# Patient Record
Sex: MALE | Race: ASIAN | Employment: OTHER | URBAN - METROPOLITAN AREA
[De-identification: names, ages, dates, MRNs, and addresses within clinical notes are randomized per-mention and may not be internally consistent; named-entity substitution may affect disease eponyms.]

---

## 2017-02-07 PROBLEM — E11.9 TYPE 2 DIABETES MELLITUS WITHOUT COMPLICATION, WITHOUT LONG-TERM CURRENT USE OF INSULIN (HCC): Status: ACTIVE | Noted: 2017-02-07

## 2017-05-21 ENCOUNTER — ANESTHESIA EVENT (OUTPATIENT)
Dept: ENDOSCOPY | Age: 67
End: 2017-05-21
Payer: MEDICARE

## 2017-05-21 RX ORDER — SODIUM CHLORIDE 0.9 % (FLUSH) 0.9 %
5-10 SYRINGE (ML) INJECTION AS NEEDED
Status: CANCELLED | OUTPATIENT
Start: 2017-05-21

## 2017-05-21 RX ORDER — SODIUM CHLORIDE, SODIUM LACTATE, POTASSIUM CHLORIDE, CALCIUM CHLORIDE 600; 310; 30; 20 MG/100ML; MG/100ML; MG/100ML; MG/100ML
100 INJECTION, SOLUTION INTRAVENOUS CONTINUOUS
Status: CANCELLED | OUTPATIENT
Start: 2017-05-21

## 2017-05-22 ENCOUNTER — ANESTHESIA (OUTPATIENT)
Dept: ENDOSCOPY | Age: 67
End: 2017-05-22
Payer: MEDICARE

## 2017-05-22 ENCOUNTER — HOSPITAL ENCOUNTER (OUTPATIENT)
Age: 67
Setting detail: OUTPATIENT SURGERY
Discharge: HOME | End: 2017-05-22
Attending: SURGERY | Admitting: SURGERY
Payer: MEDICARE

## 2017-05-22 VITALS
WEIGHT: 162 LBS | DIASTOLIC BLOOD PRESSURE: 99 MMHG | BODY MASS INDEX: 26.03 KG/M2 | HEIGHT: 66 IN | HEART RATE: 77 BPM | OXYGEN SATURATION: 97 % | RESPIRATION RATE: 18 BRPM | SYSTOLIC BLOOD PRESSURE: 182 MMHG | TEMPERATURE: 97 F

## 2017-05-22 LAB — GLUCOSE BLD STRIP.AUTO-MCNC: 138 MG/DL (ref 65–100)

## 2017-05-22 PROCEDURE — 76060000032 HC ANESTHESIA 0.5 TO 1 HR: Performed by: SURGERY

## 2017-05-22 PROCEDURE — 82962 GLUCOSE BLOOD TEST: CPT

## 2017-05-22 PROCEDURE — 76040000025: Performed by: SURGERY

## 2017-05-22 PROCEDURE — 74011250636 HC RX REV CODE- 250/636

## 2017-05-22 RX ORDER — PROPOFOL 10 MG/ML
INJECTION, EMULSION INTRAVENOUS AS NEEDED
Status: DISCONTINUED | OUTPATIENT
Start: 2017-05-22 | End: 2017-05-22 | Stop reason: HOSPADM

## 2017-05-22 RX ORDER — PROPOFOL 10 MG/ML
INJECTION, EMULSION INTRAVENOUS
Status: DISCONTINUED | OUTPATIENT
Start: 2017-05-22 | End: 2017-05-22 | Stop reason: HOSPADM

## 2017-05-22 RX ORDER — SODIUM CHLORIDE, SODIUM LACTATE, POTASSIUM CHLORIDE, CALCIUM CHLORIDE 600; 310; 30; 20 MG/100ML; MG/100ML; MG/100ML; MG/100ML
INJECTION, SOLUTION INTRAVENOUS
Status: DISCONTINUED | OUTPATIENT
Start: 2017-05-22 | End: 2017-05-22 | Stop reason: HOSPADM

## 2017-05-22 RX ADMIN — PROPOFOL 140 MCG/KG/MIN: 10 INJECTION, EMULSION INTRAVENOUS at 11:30

## 2017-05-22 RX ADMIN — PROPOFOL 50 MG: 10 INJECTION, EMULSION INTRAVENOUS at 11:30

## 2017-05-22 RX ADMIN — SODIUM CHLORIDE, SODIUM LACTATE, POTASSIUM CHLORIDE, CALCIUM CHLORIDE: 600; 310; 30; 20 INJECTION, SOLUTION INTRAVENOUS at 11:24

## 2017-05-22 NOTE — PROCEDURES
Procedure in Detail:  Informed consent was obtained for the procedure. The patient was placed in the left lateral decubitus position and sedation was induced by anesthesia. The [id number] was inserted into the rectum and advanced under direct vision to the cecum, which was identified by the ileocecal valve and appendiceal orifice. The quality of the colonic preparation was good. A careful inspection was made as the colonoscope was withdrawn, including a retroflexed view of the rectum; findings and interventions are described below. Appropriate photodocumentation was obtained. Findings:   ANUS: Anal exam reveals no masses or hemorrhoids, sphincter tone is normal.   RECTUM: Rectal exam reveals no masses or hemorrhoids. SIGMOID COLON: The mucosa is normal with good vascular pattern and without ulcers, diverticula, and polyps. DESCENDING COLON: The mucosa is normal with good vascular pattern and without ulcers, diverticula, and polyps. SPLENIC FLEXURE: The splenic flexure is normal.   TRANSVERSE COLON: The mucosa is normal with good vascular pattern and without ulcers, diverticula, and polyps. HEPATIC FLEXURE: The hepatic flexure is normal.   ASCENDING COLON: The mucosa is normal with good vascular pattern and without ulcers, diverticula, and polyps. CECUM: The appendiceal orifice appears normal. The ileocecal valve appears normal.   TERMINAL ILEUM: The terminal ileum was not entered. Specimens: No specimens were collected. Complications: None; patient tolerated the procedure well. \    EBL - none    Recommendations:   - For colon cancer screening in this average-risk patient, colonoscopy may be repeated in 10 years.      Signed By: Harleen Tavares MD                        May 22, 2017

## 2017-05-22 NOTE — ANESTHESIA POSTPROCEDURE EVALUATION
Post-Anesthesia Evaluation and Assessment    Patient: Any Ni MRN: [id number]  SSN: [id number]    YOB: 1950  Age: 73 y.o. Sex: male       Cardiovascular Function/Vital Signs  Visit Vitals    /56    Pulse 77    Temp 36.1 °C (97 °F)    Resp 18    Ht 5' 6\" (1.676 m)    Wt 73.5 kg (162 lb)    SpO2 96%    BMI 26.15 kg/m2       Patient is status post total IV anesthesia anesthesia for Procedure(s):  COLONOSCOPY / BMI / 27. Nausea/Vomiting: None    Postoperative hydration reviewed and adequate. Pain:  Pain Scale 1: Visual (05/22/17 1157)  Pain Intensity 1: 0 (05/22/17 1157)   Managed    Neurological Status: At baseline    Mental Status and Level of Consciousness: Awake. Pulmonary Status:   O2 Device: Nasal cannula (05/22/17 1158)   Adequate oxygenation and airway patent    Complications related to anesthesia: None    Post-anesthesia assessment completed. No concerns   present.   Signed By: Jacquie Combs MD     May 22, 2017

## 2017-05-22 NOTE — IP AVS SNAPSHOT
[address] 
[phone] Patient: Brianda Crews MRN: [id number] ECT:1/09[birth year] You are allergic to the following No active allergies Recent Documentation Height Weight BMI Smoking Status 1.676 m 73.5 kg 26.15 kg/m2 Never Smoker Emergency Contacts Name Discharge Info Relation Home Work Mobile Carlos Esqueda  Spouse [3] [phone] About your hospitalization You were admitted on:  May 22, 2017 You last received care in the:  [facility] You were discharged on:  May 22, 2017 Unit phone number:  [phone] Why you were hospitalized Your primary diagnosis was:  Not on File Providers Seen During Your Hospitalizations Provider Role Specialty Primary office phone Ace Ceballos MD Attending Provider General Surgery [phone] Your Primary Care Physician (PCP) Primary Care Physician Office Phone Office Fax Thom Courser [phone] Follow-up Information None Your Appointments Wednesday May 24, 2017  8:30 AM EDT Follow Up with Lawrence Elder MD  
[facility] ([facility]) [address] 
[address] [place] [address]  
[phone] Current Discharge Medication List  
  
ASK your doctor about these medications Dose & Instructions Dispensing Information Comments Morning Noon Evening Bedtime Blood-Glucose Meter monitoring kit Your last dose was: Your next dose is:    
   
   
 See Admin Instructions. Use to test blood sugars twice daily. Dx: Type 2 diabetes mellitus without complication [facility] [H12.2] Refills:  0  
     
   
   
   
  
 clobetasol 0.05 % external solution Commonly known as:  Ladarius Shah Your last dose was:     
   
Your next dose is:    
   
   
 APPLY TO SCALP AT BEDTIME AS DIRECTED  
 Refills:  2 FREESTYLE LITE STRIPS strip Generic drug:  glucose blood VI test strips Your last dose was: Your next dose is:    
   
   
 TEST BLOOD SUGAR EVERY MORNING AND ONCE EVERY EVENING Quantity:  30 Strip Refills:  11  
     
   
   
   
  
 glyBURIDE-metFORMIN 5-500 mg per tablet Commonly known as:  Orpha Asha Your last dose was: Your next dose is:    
   
   
 Dose:  2 Tab Take 2 Tabs by mouth two (2) times daily (with meals) for 90 days. Quantity:  360 Tab Refills:  3  
     
   
   
   
  
 hydroCHLOROthiazide 25 mg tablet Commonly known as:  HYDRODIURIL Your last dose was: Your next dose is:    
   
   
 Dose:  25 mg Take 1 Tab by mouth daily for 90 days. Quantity:  90 Tab Refills:  3  
     
   
   
   
  
 ketoconazole 2 % shampoo Commonly known as:  NIZORAL Your last dose was: Your next dose is: SHAMPOO EVERY MORNING AS DIRECTED Refills:  2  
     
   
   
   
  
 * lancets 30 gauge Misc Your last dose was: Your next dose is:    
   
   
 See Admin Instructions. Use to test blood sugars twice daily. Dx: Type 2 diabetes mellitus without complication ([facility] [B89.3] Refills:  0  
     
   
   
   
  
 * FREESTYLE LANCETS 28 gauge Misc Generic drug:  lancets Your last dose was: Your next dose is:    
   
   
 TEST BLOOD SUGAR EVERY MORNING AND EVERY EVENING Refills:  12  
     
   
   
   
  
 losartan 100 mg tablet Commonly known as:  COZAAR Your last dose was: Your next dose is:    
   
   
 Dose:  100 mg Take 1 Tab by mouth daily for 90 days. Quantity:  90 Tab Refills:  3  
     
   
   
   
  
 pioglitazone 15 mg tablet Commonly known as:  ACTOS Your last dose was: Your next dose is:    
   
   
 Dose:  15 mg Take 1 Tab by mouth daily. Quantity:  30 Tab Refills:  11  
     
   
   
   
  
 pravastatin 40 mg tablet Commonly known as:  PRAVACHOL Your last dose was: Your next dose is:    
   
   
 Dose:  40 mg Take 1 Tab by mouth daily. Quantity:  90 Tab Refills:  3  
     
   
   
   
  
 sildenafil citrate 100 mg tablet Commonly known as:  VIAGRA Your last dose was: Your next dose is:    
   
   
 Dose:  100 mg Take 1 Tab by mouth as needed. Quantity:  10 Tab Refills:  11 * Notice: This list has 2 medication(s) that are the same as other medications prescribed for you. Read the directions carefully, and ask your doctor or other care provider to review them with you. Discharge Instructions Gastrointestinal Colonoscopy/Flexible Sigmoidoscopy - Lower Exam Discharge Instructions 1. Call Dr. Dav Rivas at [phone] for any problems or questions. 2. Contact the doctors office for follow up appointment as directed 3. Medication may cause drowsiness for several hours, therefore, do not drive or operate machinery for remainder of the day. 4. No alcohol today. 5. Ordinarily, you may resume regular diet and activity after exam unless otherwise specified by your physician. 6. Because of air put into your colon during exam, you may experience some abdominal distension, relieved by the passage of gas, for several hours. 7. Contact your physician if you have any of the following: 
a. Excessive amount of bleeding  large amount when having a bowel movement. Occasional specks of blood with bowel movement would not be unusual. 
b. Severe abdominal pain 
c. Fever or Chills Any additional instructions:  Repeat colon in 10 years Instructions given to Ana Rosa Millie and other family members. Instructions given by:  Spencer Landaverde RN Discharge Orders None ACO Transitions of Care Introducing [employer] [address] offers a voluntary care coordination program to provide high quality service and care to [facility] fee-for-service beneficiaries. Peg Ambrocio was designed to help you enhance your health and well-being through the following services: ? Transitions of Care  support for individuals who are transitioning from one care setting to another (example: Hospital to home). ? Chronic and Complex Care Coordination  support for individuals and caregivers of those with serious or chronic illnesses or with more than one chronic (ongoing) condition and those who take a number of different medications. If you meet specific medical criteria, a [address] [place] [address] Rd may call you directly to coordinate your care with your primary care physician and your other care providers. For questions about the [facility] programs, please, contact your physicians office. For general questions or additional information about Accountable Care Organizations: 
Please visit www.medicare.gov/acos. html or call 1-800-MEDICARE (8-922.355.5304) TTY users should call 5-885.407.6122. Introducing \[facility] [place] [facility]\"" & HEALTH SERVICES! Tanis Epley introduces [employer] Steps patient portal. Now you can access parts of your medical record, email your doctor's office, and request medication refills online. 1. In your internet browser, go to [email or web address]. [email or web address] 2. Click on the First Time User? Click Here link in the Sign In box. You will see the New Member Sign Up page. 3. Enter your [employer] Steps Access Code exactly as it appears below. You will not need to use this code after youve completed the sign-up process. If you do not sign up before the expiration date, you must request a new code. · [employer] Steps Access Code: [email or web address] Expires: 7/13/2017  8:51 AM 
 
4.  Enter the last four digits of your Social Security Number (xxxx) and Date of Birth (mm/dd/yyyy) as indicated and click Submit. You will be taken to the next sign-up page. 5. Create a [employer]ikip[employer] ID. This will be your [employer] login ID and cannot be changed, so think of one that is secure and easy to remember. 6. Create a [employer] password. You can change your password at any time. 7. Enter your Password Reset Question and Answer. This can be used at a later time if you forget your password. 8. Enter your e-mail address. You will receive e-mail notification when new information is available in [address] 9. Click Sign Up. You can now view and download portions of your medical record. 10. Click the Download Summary menu link to download a portable copy of your medical information. If you have questions, please visit the Frequently Asked Questions section of the [employer] website. Remember, [employer] is NOT to be used for urgent needs. For medical emergencies, dial 911. Now available from your iPhone and Android! General Information Please provide this summary of care documentation to your next provider. Patient Signature:  ____________________________________________________________ Date:  ____________________________________________________________  
  
Laure Marisela Provider Signature:  ____________________________________________________________ Date:  ____________________________________________________________

## 2017-05-22 NOTE — H&P (VIEW-ONLY)
Marvine Rubinstein, MD, [facility], [address] [place] [address]  Brielle Blair  Phone [phone]   Fax [phone]      Date of visit: 4/25/2017     Primary/Requesting provider: Augusto Perrin MD    Chief Complaint   Patient presents with   Georganna Duverney Colon Cancer Screening          HISTORY OF PRESENT ILLNESS  Dilip Paul is a 73 y.o. male. HPI          Dilip Paul is a 73y.o. year old here to discuss colonoscopy for:  Screening colonoscopy. His history is notable for no abdominal pain, no change in bowel habits, no blood in stools or black tarry stools. Last study:   no prior exams . There is not known colon cancer/adenomatous polyps in the family (see family history). Medications:  Current Outpatient Prescriptions   Medication Sig    amLODIPine (NORVASC) 5 mg tablet Take 1 Tab by mouth daily for 30 days.  pioglitazone (ACTOS) 15 mg tablet Take 1 Tab by mouth daily.  clobetasol (TEMOVATE) 0.05 % external solution APPLY TO SCALP AT BEDTIME AS DIRECTED    ketoconazole (NIZORAL) 2 % shampoo SHAMPOO EVERY MORNING AS DIRECTED    FREESTYLE LANCETS 28 gauge misc TEST BLOOD SUGAR EVERY MORNING AND EVERY EVENING    FREESTYLE LITE STRIPS strip TEST BLOOD SUGAR EVERY MORNING AND ONCE EVERY EVENING    pravastatin (PRAVACHOL) 40 mg tablet Take 1 Tab by mouth daily.  sildenafil citrate (VIAGRA) 100 mg tablet Take 1 Tab by mouth as needed.  Blood-Glucose Meter monitoring kit See Admin Instructions. Use to test blood sugars twice daily. Dx: Type 2 diabetes mellitus without complication (HCC) [T67.8]    lancets 30 gauge misc See Admin Instructions. Use to test blood sugars twice daily. Dx: Type 2 diabetes mellitus without complication (HCC) [T14.2]    glyBURIDE-metFORMIN (GLUCOVANCE) 5-500 mg per tablet Take 2 Tabs by mouth two (2) times daily (with meals) for 90 days.  hydrochlorothiazide (HYDRODIURIL) 25 mg tablet Take 1 Tab by mouth daily for 90 days.     losartan (COZAAR) 100 mg tablet Take 1 Tab by mouth daily for 90 days. No current facility-administered medications for this visit. Allergies:  No Known Allergies     Past History:  Past Medical History:   Diagnosis Date    Essential hypertension, benign     H/O appendicitis     Hyperlipidemia     Type 2 diabetes mellitus without complication ([address]     Wears dentures         Past Surgical History:   Procedure Laterality Date    HX APPENDECTOMY  13        Family and Social History:  Family History   Problem Relation Age of Onset    Diabetes Sister      type II    Diabetes Brother      type II       Social History     Social History    Marital status:      Spouse name: N/A    Number of children: N/A    Years of education: N/A     Occupational History    Not on file. Social History Main Topics    Smoking status: Never Smoker    Smokeless tobacco: Never Used    Alcohol use 0.0 oz/week     0 Standard drinks or equivalent per week      Comment: social, 3 or less per week    Drug use: Not on file    Sexual activity: Not on file     Other Topics Concern    Not on file     Social History Narrative           Review of Systems   Constitutional: Negative. HENT: Negative. Eyes: Negative. Respiratory: Negative. Cardiovascular: Negative. Gastrointestinal: Negative. Genitourinary: Negative. Musculoskeletal: Negative. Skin: Negative. Neurological: Negative. Endo/Heme/Allergies: Negative. Psychiatric/Behavioral: Negative. Physical Exam  Visit Vitals    /87    Pulse 92    Ht 5' 5\" (1.651 m)    Wt 163 lb (73.9 kg)    BMI 27.12 kg/m2     General Appearance: In no acute distress   Ears/Nose/Mouth/Throat:   Hearing grossly normal.         Neck: Supple. Chest:   Lungs clear to auscultation bilaterally. Cardiovascular:  Regular rate and rhythm, S1, S2 normal, no murmur. Abdomen:   Soft.    Extremities: No gross deformity    Neuro: alert and oriented x 3 ASSESSMENT and PLAN  Encounter Diagnoses   Name Primary?  Encounter for screening for malignant neoplasm of colon Yes     Will proceed with Colonoscopy. Procedure and preparation reviewed. Risks reviewed including sedation risks, bleeding, perforation, incomplete exam, and missed pathology. Take prep the day prior to the procedure, as reviewed by the nurse. Call if any questions regarding the prep.

## 2017-05-22 NOTE — INTERVAL H&P NOTE
H&P Update:  Devora Eubanks was seen and examined. History and physical has been reviewed. The patient has been examined.  There have been no significant clinical changes since the completion of the originally dated History and Physical.    Signed By: Merline Deal, MD     May 22, 2017 11:22 AM

## 2017-05-22 NOTE — ANESTHESIA PREPROCEDURE EVALUATION
Anesthetic History   No history of anesthetic complications            Review of Systems / Medical History  Pertinent labs reviewed    Pulmonary  Within defined limits                 Neuro/Psych   Within defined limits           Cardiovascular    Hypertension              Exercise tolerance: >4 METS     GI/Hepatic/Renal  Within defined limits              Endo/Other    Diabetes         Other Findings            Physical Exam    Airway  Mallampati: III  TM Distance: 4 - 6 cm  Neck ROM: normal range of motion   Mouth opening: Normal     Cardiovascular  Regular rate and rhythm,  S1 and S2 normal,  no murmur, click, rub, or gallop             Dental  No notable dental hx       Pulmonary  Breath sounds clear to auscultation               Abdominal  GI exam deferred       Other Findings            Anesthetic Plan    ASA: 2  Anesthesia type: total IV anesthesia          Induction: Intravenous  Anesthetic plan and risks discussed with: Patient       present.

## 2017-05-22 NOTE — PROGRESS NOTES
Pt taking po fluids well. Passing flatus. No c/o pain. Discharged home via wheelchair with family.   Escorted to car by Rkylin, rn

## 2017-05-22 NOTE — DISCHARGE INSTRUCTIONS
Gastrointestinal Colonoscopy/Flexible Sigmoidoscopy - Lower Exam Discharge Instructions  1. Call Dr. Jignesh Lawler at [phone] for any problems or questions. 2. Contact the doctors office for follow up appointment as directed  3. Medication may cause drowsiness for several hours, therefore, do not drive or operate machinery for remainder of the day. 4. No alcohol today. 5. Ordinarily, you may resume regular diet and activity after exam unless otherwise specified by your physician. 6. Because of air put into your colon during exam, you may experience some abdominal distension, relieved by the passage of gas, for several hours. 7. Contact your physician if you have any of the following:  a. Excessive amount of bleeding - large amount when having a bowel movement. Occasional specks of blood with bowel movement would not be unusual.  b. Severe abdominal pain  c. Fever or Chills    Any additional instructions:  Repeat colon in 10 years      Instructions given to Jermainla Tariq and other family members.   Instructions given by:  Roberta Almaraz RN

## 2017-09-15 ENCOUNTER — APPOINTMENT (OUTPATIENT)
Dept: CT IMAGING | Age: 67
End: 2017-09-15
Attending: EMERGENCY MEDICINE
Payer: MEDICARE

## 2017-09-15 ENCOUNTER — HOSPITAL ENCOUNTER (EMERGENCY)
Age: 67
Discharge: HOME | End: 2017-09-15
Attending: EMERGENCY MEDICINE
Payer: MEDICARE

## 2017-09-15 VITALS
BODY MASS INDEX: 25.83 KG/M2 | DIASTOLIC BLOOD PRESSURE: 74 MMHG | HEART RATE: 76 BPM | SYSTOLIC BLOOD PRESSURE: 137 MMHG | HEIGHT: 65 IN | TEMPERATURE: 98.1 F | WEIGHT: 155 LBS | OXYGEN SATURATION: 96 % | RESPIRATION RATE: 16 BRPM

## 2017-09-15 DIAGNOSIS — R42 DIZZINESS: Primary | ICD-10-CM

## 2017-09-15 LAB
ANION GAP SERPL CALC-SCNC: 5 MMOL/L (ref 7–16)
BASOPHILS # BLD: 0 K/UL (ref 0–0.2)
BASOPHILS NFR BLD: 0 % (ref 0–2)
BUN SERPL-MCNC: 22 MG/DL (ref 8–23)
CALCIUM SERPL-MCNC: 8.4 MG/DL (ref 8.3–10.4)
CHLORIDE SERPL-SCNC: 96 MMOL/L (ref 98–107)
CO2 SERPL-SCNC: 33 MMOL/L (ref 21–32)
CREAT SERPL-MCNC: 1.25 MG/DL (ref 0.8–1.5)
DIFFERENTIAL METHOD BLD: ABNORMAL
EOSINOPHIL # BLD: 0.3 K/UL (ref 0–0.8)
EOSINOPHIL NFR BLD: 4 % (ref 0.5–7.8)
ERYTHROCYTE [DISTWIDTH] IN BLOOD BY AUTOMATED COUNT: 12.5 % (ref 11.9–14.6)
GLUCOSE BLD STRIP.AUTO-MCNC: 171 MG/DL (ref 65–100)
GLUCOSE SERPL-MCNC: 164 MG/DL (ref 65–100)
HCT VFR BLD AUTO: 39.1 % (ref 41.1–50.3)
HGB BLD-MCNC: 12.9 G/DL (ref 13.6–17.2)
IMM GRANULOCYTES # BLD: 0 K/UL (ref 0–0.5)
IMM GRANULOCYTES NFR BLD: 0.2 % (ref 0–5)
LYMPHOCYTES # BLD: 2.1 K/UL (ref 0.5–4.6)
LYMPHOCYTES NFR BLD: 23 % (ref 13–44)
MCH RBC QN AUTO: 28.4 PG (ref 26.1–32.9)
MCHC RBC AUTO-ENTMCNC: 33 G/DL (ref 31.4–35)
MCV RBC AUTO: 86.1 FL (ref 79.6–97.8)
MONOCYTES # BLD: 0.7 K/UL (ref 0.1–1.3)
MONOCYTES NFR BLD: 8 % (ref 4–12)
NEUTS SEG # BLD: 5.8 K/UL (ref 1.7–8.2)
NEUTS SEG NFR BLD: 65 % (ref 43–78)
PLATELET # BLD AUTO: 244 K/UL (ref 150–450)
PMV BLD AUTO: 10.1 FL (ref 10.8–14.1)
POTASSIUM SERPL-SCNC: 3.6 MMOL/L (ref 3.5–5.1)
RBC # BLD AUTO: 4.54 M/UL (ref 4.23–5.67)
SODIUM SERPL-SCNC: 134 MMOL/L (ref 136–145)
WBC # BLD AUTO: 9 K/UL (ref 4.3–11.1)

## 2017-09-15 PROCEDURE — 85025 COMPLETE CBC W/AUTO DIFF WBC: CPT | Performed by: STUDENT IN AN ORGANIZED HEALTH CARE EDUCATION/TRAINING PROGRAM

## 2017-09-15 PROCEDURE — 96375 TX/PRO/DX INJ NEW DRUG ADDON: CPT | Performed by: EMERGENCY MEDICINE

## 2017-09-15 PROCEDURE — 70450 CT HEAD/BRAIN W/O DYE: CPT

## 2017-09-15 PROCEDURE — 82962 GLUCOSE BLOOD TEST: CPT

## 2017-09-15 PROCEDURE — 96361 HYDRATE IV INFUSION ADD-ON: CPT | Performed by: EMERGENCY MEDICINE

## 2017-09-15 PROCEDURE — 93005 ELECTROCARDIOGRAM TRACING: CPT | Performed by: EMERGENCY MEDICINE

## 2017-09-15 PROCEDURE — 96374 THER/PROPH/DIAG INJ IV PUSH: CPT | Performed by: EMERGENCY MEDICINE

## 2017-09-15 PROCEDURE — 99284 EMERGENCY DEPT VISIT MOD MDM: CPT | Performed by: EMERGENCY MEDICINE

## 2017-09-15 PROCEDURE — 80048 BASIC METABOLIC PNL TOTAL CA: CPT | Performed by: STUDENT IN AN ORGANIZED HEALTH CARE EDUCATION/TRAINING PROGRAM

## 2017-09-15 PROCEDURE — 74011250636 HC RX REV CODE- 250/636: Performed by: EMERGENCY MEDICINE

## 2017-09-15 RX ORDER — MECLIZINE HYDROCHLORIDE 25 MG/1
25 TABLET ORAL
Qty: 9 TAB | Refills: 1 | Status: SHIPPED | OUTPATIENT
Start: 2017-09-15 | End: 2017-11-03 | Stop reason: SDUPTHER

## 2017-09-15 RX ORDER — ONDANSETRON 2 MG/ML
4 INJECTION INTRAMUSCULAR; INTRAVENOUS
Status: COMPLETED | OUTPATIENT
Start: 2017-09-15 | End: 2017-09-15

## 2017-09-15 RX ORDER — LORAZEPAM 2 MG/ML
0.5 INJECTION INTRAMUSCULAR
Status: COMPLETED | OUTPATIENT
Start: 2017-09-15 | End: 2017-09-15

## 2017-09-15 RX ORDER — PROMETHAZINE HYDROCHLORIDE 25 MG/1
25 TABLET ORAL
Qty: 12 TAB | Refills: 0 | Status: SHIPPED | OUTPATIENT
Start: 2017-09-15 | End: 2018-04-04

## 2017-09-15 RX ORDER — SODIUM CHLORIDE 9 MG/ML
1000 INJECTION, SOLUTION INTRAVENOUS ONCE
Status: COMPLETED | OUTPATIENT
Start: 2017-09-15 | End: 2017-09-15

## 2017-09-15 RX ADMIN — ONDANSETRON 4 MG: 2 INJECTION INTRAMUSCULAR; INTRAVENOUS at 22:51

## 2017-09-15 RX ADMIN — LORAZEPAM 0.5 MG: 2 INJECTION INTRAMUSCULAR; INTRAVENOUS at 22:51

## 2017-09-15 RX ADMIN — SODIUM CHLORIDE 1000 ML: 900 INJECTION, SOLUTION INTRAVENOUS at 22:51

## 2017-09-16 LAB
ATRIAL RATE: 72 BPM
CALCULATED P AXIS, ECG09: 66 DEGREES
CALCULATED R AXIS, ECG10: 53 DEGREES
CALCULATED T AXIS, ECG11: 66 DEGREES
DIAGNOSIS, 93000: NORMAL
P-R INTERVAL, ECG05: 162 MS
Q-T INTERVAL, ECG07: 406 MS
QRS DURATION, ECG06: 96 MS
QTC CALCULATION (BEZET), ECG08: 444 MS
VENTRICULAR RATE, ECG03: 72 BPM

## 2017-09-16 NOTE — ED PROVIDER NOTES
Patient is a 73 y.o. male presenting with dizziness. The history is provided by the patient. Dizziness   This is a recurrent problem. The current episode started more than 2 days ago. The problem has not changed since onset. There was no focality noted. Primary symptoms include loss of balance. Pertinent negatives include no focal weakness, no slurred speech, no speech difficulty, no movement disorder, no mental status change, no unresponsiveness and no disorientation. There has been no fever. Associated symptoms include vomiting and nausea. Pertinent negatives include no altered mental status, no confusion and no headaches. Associated medical issues do not include trauma or CVA. Past Medical History:   Diagnosis Date    Essential hypertension, benign     H/O appendicitis     Hyperlipidemia     Type 2 diabetes mellitus without complication (HCC)     normal 90, checks once a day     Wears dentures        Past Surgical History:   Procedure Laterality Date    COLONOSCOPY N/A 5/22/2017    COLONOSCOPY / BMI / 27 performed by Sabina Vargas MD at [facility] END[facility]COPY    HX [address] FLX DX W/COLLJ [address] [facility] [address] WHEN PFRMD  5/22/2017              Family History:   Problem Relation Age of Onset    Diabetes Sister      type II    Diabetes Brother      type II       Social History     Social History    Marital status:      Spouse name: N/A    Number of children: N/A    Years of education: N/A     Occupational History    Not on file. Social History Main Topics    Smoking status: Never Smoker    Smokeless tobacco: Never Used    Alcohol use 0.0 oz/week     0 Standard drinks or equivalent per week      Comment: social, 3 or less per week    Drug use: Not on file    Sexual activity: Not on file     Other Topics Concern    Not on file     Social History Narrative         ALLERGIES: Review of patient's allergies indicates no known allergies.     Review of Systems   Constitutional: Negative. HENT: Negative. Respiratory: Negative. Cardiovascular: Negative. Gastrointestinal: Positive for nausea and vomiting. Endocrine: Negative. Genitourinary: Negative. Musculoskeletal: Negative. Skin: Negative. Neurological: Positive for dizziness and loss of balance. Negative for focal weakness, speech difficulty and headaches. Hematological: Negative. Psychiatric/Behavioral: Negative for confusion. Vitals:    09/15/17 2124   BP: 186/85   Pulse: 63   Resp: 16   Temp: 98.1 °F (36.7 °C)   SpO2: 99%   Weight: 70.3 kg (155 lb)   Height: 5' 5\" (1.651 m)            Physical Exam   Constitutional: He is oriented to person, place, and time. He appears well-developed and well-nourished. No distress. HENT:   Head: Normocephalic and atraumatic. Eyes: EOM are normal. Pupils are equal, round, and reactive to light. Right eye exhibits normal extraocular motion and no nystagmus. Left eye exhibits normal extraocular motion and no nystagmus. Cardiovascular: Intact distal pulses. Pulmonary/Chest: Effort normal.   Abdominal: Soft. He exhibits no distension. There is no tenderness. Neurological: He is alert and oriented to person, place, and time. Skin: Skin is warm and dry. Psychiatric: He has a normal mood and affect. His behavior is normal.   Nursing note and vitals reviewed. 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 He describes the \"dizziness\" as an off-balance feeling although he can't remember specifically having any hallucinations. Patient states that his symptoms are improved currently, especially when sitting down. Symptoms do seem to be somewhat exacerbated by position. No recent illness. Last medication change was one month ago.   Patient does report that he has had similar symptoms when his medications of change but he did not know if it was related since it has been at least 4 weeks. No focal neurologic changes. Patient able to get up off the stretcher and ambulate without any assistance. Medications provided further mild, persistent nausea and \"dizziness\", IV fluids. Will reassess after completion of studies. Presumably will be normal as expected and should be able to follow up on an outpatient basis. Unexpected returns to the ER acutely for change in condition or other concerns prior to follow-up assuming again that studies in the ER are unremarkable as expected. Last HgbA1C in August demonstrated an average BGL around 210. Laboratory studies unremarkable. CT completed and awaiting read. Patient currently receiving IV fluids and medications. States is still feeling better since arrival to the ER. We discussed follow-up and the patient states he does still see Dr. Wero Galvez can contact the office on Monday to short-term follow-up. Understands to return to the ER as needed and interval.  Likely multifactorial cause of symptoms and less likely central but can not be excluded completely without close follow up. Will provide when necessary medications for symptoms but understands also that if he has any change in his condition or new symptoms that develop and should have a prompt follow-up in the ER if needed.        Amount and/or Complexity of Data Reviewed  Clinical lab tests: ordered and reviewed (Results for orders placed or performed during the hospital encounter of 09/15/17  -CBC WITH AUTOMATED DIFF       Result                                            Value                         Ref Range                       WBC                                               9.0                           4.3 - 11.1 K/uL                 RBC                                               4.54                          4.23 - 5.67 M/uL                HGB 12.9 (L)                      13.6 - 17.2 g/dL                HCT                                               39.1 (L)                      41.1 - 50.3 %                   MCV                                               86.1                          79.6 - 97.8 FL                  MCH                                               28.4                          26.1 - 32.9 PG                  MCHC                                              33.0                          31.4 - 35.0 g/dL                RDW                                               12.5                          11.9 - 14.6 %                   PLATELET                                          244                           150 - 450 K/uL                  MPV                                               10.1 (L)                      10.8 - 14.1 FL                  DF                                                AUTOMATED                                                     NEUTROPHILS                                       65                            43 - 78 %                       LYMPHOCYTES                                       23                            13 - 44 %                       MONOCYTES                                         8                             4.0 - 12.0 %                    EOSINOPHILS                                       4                             0.5 - 7.8 %                     BASOPHILS                                         0                             0.0 - 2.0 %                     IMMATURE GRANULOCYTES                             0.2                           0.0 - 5.0 %                     ABS. NEUTROPHILS                                  5.8                           1.7 - 8.2 K/UL                  ABS.  LYMPHOCYTES                                  2.1                           0.5 - 4.6 K/UL                  ABS. MONOCYTES                                    0.7 0.1 - 1.3 K/UL                  ABS. EOSINOPHILS                                  0.3                           0.0 - 0.8 K/UL                  ABS. BASOPHILS                                    0.0                           0.0 - 0.2 K/UL                  ABS. IMM.  GRANS.                                  0.0                           0.0 - 0.5 K/UL             -METABOLIC PANEL, BASIC       Result                                            Value                         Ref Range                       Sodium                                            134 (L)                       136 - 145 mmol/L                Potassium                                         3.6                           3.5 - 5.1 mmol/L                Chloride                                          96 (L)                        98 - 107 mmol/L                 CO2                                               33 (H)                        21 - 32 mmol/L                  Anion gap                                         5 (L)                         7 - 16 mmol/L                   Glucose                                           164 (H)                       65 - 100 mg/dL                  BUN                                               22                            8 - 23 MG/DL                    Creatinine                                        1.25                          0.8 - 1.5 MG/DL                 GFR est AA                                        >60                           >60 ml/min/1.73m2               GFR est non-AA                                    >60                           >60 ml/min/1.73m2               Calcium                                           8.4                           8.3 - 10.4 MG/DL           -GLUCOSE, POC       Result                                            Value                         Ref Range                       Glucose (POC)                                     171 (H) 65 - 100 mg/dL             -EKG, 12 LEAD, INITIAL       Result                                            Value                         Ref Range                       Ventricular Rate                                  72                            BPM                             Atrial Rate                                       72                            BPM                             P-R Interval                                      162                           ms                              QRS Duration                                      96                            ms                              Q-T Interval                                      406                           ms                              QTC Calculation (Bezet)                           444                           ms                              Calculated P Axis                                 66                            degrees                         Calculated R Axis                                 53                            degrees                         Calculated T Axis                                 66                            degrees                         Diagnosis                                                                                                   Normal sinus rhythm   Normal ECG   No previous ECGs available     )  Tests in the radiology section of CPT®: ordered and reviewed      ED Course       Procedures

## 2017-09-16 NOTE — ED NOTES
Pt reports dizziness since this afternoon, no N/V/D. Pt states that he gets dizzy after medication changes for his DM and HTN though last med change was 1 month ago. 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

## 2017-09-16 NOTE — ED NOTES
I have reviewed discharge instructions with the patient and spouse. The patient and spouse verbalized understanding. Patient left ED via Discharge Method: ambulatory to Home with (insert name of family/friend, self, transport, spouse and other family member). Opportunity for questions and clarification provided. Patient given 2 scripts.

## 2017-09-16 NOTE — DISCHARGE INSTRUCTIONS
Dizziness: Care Instructions  Your Care Instructions  Dizziness is the feeling of unsteadiness or fuzziness in your head. It is different than having vertigo, which is a feeling that the room is spinning or that you are moving or falling. It is also different from lightheadedness, which is the feeling that you are about to faint. It can be hard to know what causes dizziness. Some people feel dizzy when they have migraine headaches. Sometimes bouts of flu can make you feel dizzy. Some medical conditions, such as heart problems or high blood pressure, can make you feel dizzy. Many medicines can cause dizziness, including medicines for high blood pressure, pain, or anxiety. If a medicine causes your symptoms, your doctor may recommend that you stop or change the medicine. If it is a problem with your heart, you may need medicine to help your heart work better. If there is no clear reason for your symptoms, your doctor may suggest watching and waiting for a while to see if the dizziness goes away on its own. Follow-up care is a key part of your treatment and safety. Be sure to make and go to all appointments, and call your doctor if you are having problems. It's also a good idea to know your test results and keep a list of the medicines you take. How can you care for yourself at home? · If your doctor recommends or prescribes medicine, take it exactly as directed. Call your doctor if you think you are having a problem with your medicine. · Do not drive while you feel dizzy. · Try to prevent falls. Steps you can take include:  ¨ Using nonskid mats, adding grab bars near the tub, and using night-lights. ¨ Clearing your home so that walkways are free of anything you might trip on. ¨ Letting family and friends know that you have been feeling dizzy. This will help them know how to help you. When should you call for help? Call 911 anytime you think you may need emergency care.  For example, call if:  · You passed out (lost consciousness). · You have dizziness along with symptoms of a heart attack. These may include:  ¨ Chest pain or pressure, or a strange feeling in the chest.  ¨ Sweating. ¨ Shortness of breath. ¨ Nausea or vomiting. ¨ Pain, pressure, or a strange feeling in the back, neck, jaw, or upper belly or in one or both shoulders or arms. ¨ Lightheadedness or sudden weakness. ¨ A fast or irregular heartbeat. · You have symptoms of a stroke. These may include:  ¨ Sudden numbness, tingling, weakness, or loss of movement in your face, arm, or leg, especially on only one side of your body. ¨ Sudden vision changes. ¨ Sudden trouble speaking. ¨ Sudden confusion or trouble understanding simple statements. ¨ Sudden problems with walking or balance. ¨ A sudden, severe headache that is different from past headaches. Call your doctor now or seek immediate medical care if:  · You feel dizzy and have a fever, headache, or ringing in your ears. · You have new or increased nausea and vomiting. · Your dizziness does not go away or comes back. Watch closely for changes in your health, and be sure to contact your doctor if:  · You do not get better as expected. Where can you learn more? Go to http://nelly-radha.info/. Enter U472 in the search box to learn more about \"Dizziness: Care Instructions. \"  Current as of: March 20, 2017  Content Version: 11.3  © 5547-6053 [employer]. Care instructions adapted under license by [employer] (which disclaims liability or warranty for this information). If you have questions about a medical condition or this instruction, always ask your healthcare professional. [place] [address] any warranty or liability for your use of this information.

## 2017-11-03 PROBLEM — H81.10 BENIGN PAROXYSMAL POSITIONAL VERTIGO: Status: ACTIVE | Noted: 2017-11-03

## 2018-04-04 PROBLEM — E11.9 TYPE 2 DIABETES MELLITUS WITHOUT COMPLICATION, WITHOUT LONG-TERM CURRENT USE OF INSULIN (HCC): Status: RESOLVED | Noted: 2017-02-07 | Resolved: 2018-04-04

## 2018-04-04 PROBLEM — E11.21 TYPE 2 DIABETES WITH NEPHROPATHY (HCC): Status: ACTIVE | Noted: 2018-04-04

## 2018-12-17 ENCOUNTER — HOSPITAL ENCOUNTER (EMERGENCY)
Age: 68
Discharge: HOME | End: 2018-12-17
Attending: EMERGENCY MEDICINE
Payer: MEDICARE

## 2018-12-17 ENCOUNTER — APPOINTMENT (OUTPATIENT)
Dept: CT IMAGING | Age: 68
End: 2018-12-17
Attending: EMERGENCY MEDICINE
Payer: MEDICARE

## 2018-12-17 VITALS
DIASTOLIC BLOOD PRESSURE: 80 MMHG | HEART RATE: 94 BPM | RESPIRATION RATE: 16 BRPM | OXYGEN SATURATION: 98 % | HEIGHT: 63 IN | TEMPERATURE: 98.7 F | SYSTOLIC BLOOD PRESSURE: 140 MMHG | WEIGHT: 165 LBS | BODY MASS INDEX: 29.23 KG/M2

## 2018-12-17 DIAGNOSIS — S00.03XA CONTUSION OF SCALP, INITIAL ENCOUNTER: Primary | ICD-10-CM

## 2018-12-17 PROCEDURE — 99283 EMERGENCY DEPT VISIT LOW MDM: CPT | Performed by: EMERGENCY MEDICINE

## 2018-12-17 PROCEDURE — 70450 CT HEAD/BRAIN W/O DYE: CPT

## 2018-12-18 NOTE — ED TRIAGE NOTES
Pt presents to the ED post fall from 4 foot ladder,  Reports he landed on his head,  Abrasion noted,  Denies LOC

## 2018-12-18 NOTE — ED PROVIDER NOTES
Patient presents after a fall at home. He states he was doing some painting and staining on a ladder when the ladder fell over the side and he fell backwards landing on the floor hitting his head on the floor. He denies loss of consciousness. He denies any other symptoms currently other than some pain in his right thigh. He noted a lump on the back of his head with a small amount of bleeding which is why he came to admit for evaluation. The history is provided by the patient. Fall   The accident occurred 3 to 5 hours ago. The fall occurred from a ladder. He fell from a height of 3 - 5 ft. He landed on hard floor. The volume of blood lost was minimal. The point of impact was the head. The pain is present in the head (right hamstring). The pain is at a severity of 3/10. The pain is mild. He was ambulatory at the scene. There was no entrapment after the fall. There was no drug use involved in the accident. There was no alcohol use involved in the accident. Pertinent negatives include no visual change, no fever, no abdominal pain, no bowel incontinence, no nausea, no hematuria, no headaches, no extremity weakness, no hearing loss, no loss of consciousness, no tingling and no laceration. The risk factors include none. The symptoms are aggravated by pressure on injury. He has tried nothing for the symptoms. The treatment provided no relief.         Past Medical History:   Diagnosis Date    Essential hypertension, benign     H/O appendicitis     Hyperlipidemia     Type 2 diabetes mellitus without complication (HCC)     normal 90, checks once a day     Wears dentures        Past Surgical History:   Procedure Laterality Date    COLONOSCOPY N/A 5/22/2017    COLONOSCOPY / BMI / 27 performed by Marlen Majano MD at [facility] ENDOSCOPY    HX [address] FLX DX W/SARA [place] 1977 PFRMD  5/22/2017              Family History:   Problem Relation Age of Onset    Diabetes Sister         type II    Diabetes Brother         type II       Social History     Socioeconomic History    Marital status:      Spouse name: Not on file    Number of children: Not on file    Years of education: Not on file    Highest education level: Not on file   Social Needs    Financial resource strain: Not on file    Food insecurity - worry: Not on file    Food insecurity - inability: Not on file   [employer] needs - medical: Not on file   [employer] needs - non-medical: Not on file   Occupational History    Not on file   Tobacco Use    Smoking status: Never Smoker    Smokeless tobacco: Never Used   Substance and Sexual Activity    Alcohol use: Yes     Alcohol/week: 0.0 oz     Comment: social, 3 or less per week    Drug use: Not on file    Sexual activity: Not on file   Other Topics Concern    Not on file   Social History Narrative    Not on file         ALLERGIES: Patient has no known allergies. Review of Systems   Constitutional: Negative for fever. Gastrointestinal: Negative for abdominal pain, bowel incontinence and nausea. Genitourinary: Negative for hematuria. Musculoskeletal: Negative for extremity weakness. Neurological: Negative for tingling, loss of consciousness and headaches. All other systems reviewed and are negative. Vitals:    12/17/18 2037   BP: 182/90   Pulse: 98   Resp: 18   Temp: 98.7 °F (37.1 °C)   SpO2: 99%   Weight: 74.8 kg (165 lb)   Height: 5' 3\" (1.6 m)            Physical Exam   Constitutional: He is oriented to person, place, and time. He appears well-developed and well-nourished. No distress. HENT:   Head: Normocephalic and atraumatic. Right Ear: External ear normal.   Left Ear: External ear normal.   Mouth/Throat: Oropharynx is clear and moist.   Small hematoma noted to the right occipital parietal area with an abrasion, there is no laceration or active bleeding noted. No russell sign and no raccoon sign.   No hemotympanum   Eyes: EOM are normal. Pupils are equal, round, and reactive to light. Neck: Normal range of motion. Neck supple. Neurological: He is alert and oriented to person, place, and time. He displays normal reflexes. No cranial nerve deficit or sensory deficit. He exhibits normal muscle tone. Coordination normal.   ppatient relates with a steady gait although a slight limp from the pain in his right thigh   Skin: Skin is warm and dry. No laceration noted. He is not diaphoretic. Psychiatric: He has a normal mood and affect. His behavior is normal.   Nursing note and vitals reviewed.        MDM  Number of Diagnoses or Management Options  Contusion of scalp, initial encounter:      Amount and/or Complexity of Data Reviewed  Clinical lab tests: ordered and reviewed    Risk of Complications, Morbidity, and/or Mortality  Presenting problems: low  Diagnostic procedures: low  Management options: low    Patient Progress  Patient progress: stable         Procedures

## 2018-12-18 NOTE — ED NOTES
Pt asking how much longer before he is to be seen.   Attempted to explain the acuity of the pt's and how long before he is seen

## 2018-12-18 NOTE — ED NOTES
I have reviewed discharge instructions with the patient. The patient verbalized understanding. Patient left ED via Discharge Method: ambulatory to Home with ( self). Opportunity for questions and clarification provided. Patient given 0 scripts. To continue your aftercare when you leave the hospital, you may receive an automated call from our care team to check in on how you are doing. This is a free service and part of our promise to provide the best care and service to meet your aftercare needs.  If you have questions, or wish to unsubscribe from this service please call [phone]. Thank you for Choosing our [facility] Emergency Department.

## 2018-12-18 NOTE — DISCHARGE INSTRUCTIONS
Bruises: Care Instructions  Your Care Instructions    Bruises occur when small blood vessels under the skin tear or rupture, most often from a twist, bump, or fall. Blood leaks into tissues under the skin and causes a black-and-blue spot that often turns colors, including purplish black, reddish blue, or yellowish green, as the bruise heals. Bruises hurt, but most are not serious and will go away on their own within 2 to 4 weeks. Sometimes, gravity causes them to spread down the body. A leg bruise usually will take longer to heal than a bruise on the face or arms. Follow-up care is a key part of your treatment and safety. Be sure to make and go to all appointments, and call your doctor if you are having problems. It's also a good idea to know your test results and keep a list of the medicines you take. How can you care for yourself at home? · Take pain medicines exactly as directed. ? If the doctor gave you a prescription medicine for pain, take it as prescribed. ? If you are not taking a prescription pain medicine, ask your doctor if you can take an over-the-counter medicine. · Put ice or a cold pack on the area for 10 to 20 minutes at a time. Put a thin cloth between the ice and your skin. · If you can, prop up the bruised area on pillows as much as possible for the next few days. Try to keep the bruise above the level of your heart. When should you call for help? Call your doctor now or seek immediate medical care if:    · You have signs of infection, such as:  ? Increased pain, swelling, warmth, or redness. ? Red streaks leading from the bruise. ? Pus draining from the bruise. ? A fever.     · You have a bruise on your leg and signs of a blood clot, such as:  ? Increasing redness and swelling along with warmth, tenderness, and pain in the bruised area. ? Pain in your calf, back of the knee, thigh, or groin. ?  Redness and swelling in your leg or groin.     · Your pain gets worse.   Nicole Loving closely for changes in your health, and be sure to contact your doctor if:    · You do not get better as expected. Where can you learn more? Go to http://nelly-radha.info/. Enter [phone] in the search box to learn more about \"Bruises: Care Instructions. \"  Current as of: November 20, 2017  Content Version: 11.8  © 4726-0062 [employer]. Care instructions adapted under license by [employer] (which disclaims liability or warranty for this information). If you have questions about a medical condition or this instruction, always ask your healthcare professional. [place] [address] any warranty or liability for your use of this information.

## 2018-12-18 NOTE — ED NOTES
Pt presents to the ED for a head injury. S/p fall today.   States that he fell onto his head but denies LOC

## 2019-03-18 ENCOUNTER — HOSPITAL ENCOUNTER (EMERGENCY)
Age: 69
Discharge: HOME | End: 2019-03-18
Attending: EMERGENCY MEDICINE | Admitting: EMERGENCY MEDICINE
Payer: MEDICARE

## 2019-03-18 ENCOUNTER — APPOINTMENT (OUTPATIENT)
Dept: GENERAL RADIOLOGY | Age: 69
End: 2019-03-18
Attending: EMERGENCY MEDICINE
Payer: MEDICARE

## 2019-03-18 VITALS
DIASTOLIC BLOOD PRESSURE: 90 MMHG | SYSTOLIC BLOOD PRESSURE: 152 MMHG | OXYGEN SATURATION: 97 % | HEART RATE: 75 BPM | BODY MASS INDEX: 28.17 KG/M2 | TEMPERATURE: 98.4 F | WEIGHT: 165 LBS | HEIGHT: 64 IN | RESPIRATION RATE: 16 BRPM

## 2019-03-18 DIAGNOSIS — S22.42XA CLOSED FRACTURE OF MULTIPLE RIBS OF LEFT SIDE, INITIAL ENCOUNTER: Primary | ICD-10-CM

## 2019-03-18 PROCEDURE — 71111 X-RAY EXAM RIBS/CHEST4/> VWS: CPT

## 2019-03-18 PROCEDURE — 72070 X-RAY EXAM THORAC SPINE 2VWS: CPT

## 2019-03-18 PROCEDURE — 99283 EMERGENCY DEPT VISIT LOW MDM: CPT | Performed by: EMERGENCY MEDICINE

## 2019-03-18 PROCEDURE — 77030027138 HC INCENT SPIROMETER -A: Performed by: EMERGENCY MEDICINE

## 2019-03-18 RX ORDER — NAPROXEN 500 MG/1
500 TABLET ORAL 2 TIMES DAILY WITH MEALS
Qty: 20 TAB | Refills: 0 | Status: SHIPPED | OUTPATIENT
Start: 2019-03-18 | End: 2019-03-28

## 2019-03-18 RX ORDER — LIDOCAINE 50 MG/G
1 PATCH TOPICAL EVERY 24 HOURS
Qty: 5 EACH | Refills: 0 | Status: SHIPPED | OUTPATIENT
Start: 2019-03-18 | End: 2019-04-02 | Stop reason: ALTCHOICE

## 2019-03-18 NOTE — ED PROVIDER NOTES
Garth Angel is a 80 y.o. male seen on 3/18/2019 at 6:38 PM in the [facility] EMERGENCY DEPT in room HE/E. Chief Complaint   Patient presents with   [address] [facility] [address] Fall       HPI:  72-year-old male presenting to the emergency department for evaluation of back pain. Yesterday he was walking down stairs when he slipped off the front edge of the stairs and fell. The corner of the stair hit him in the mid upper back and he has had ongoing back pain gets worse when he bends over. He's been using heat and taken ibuprofen but it has not helped his pain. He denies hitting his head, no loss of consciousness. He has a history of vertigo and states that he is also had some increased vertigo symptoms over the last 24 hours. He is also diabetic. He has a history for the past 3 years of some mild neuropathy in the soles of his feet. He denies any new numbness or tingling, no weakness in the lower extremities, no incontinence of urine or stool. He denies chest pain or shortness of breath. Historian: patient, using professional     REVIEW OF SYSTEMS     Review of Systems   Constitutional: Negative for fatigue and fever. HENT: Negative. Eyes: Negative. Respiratory: Negative for cough, chest tightness, shortness of breath and wheezing. Cardiovascular: Negative for chest pain. Gastrointestinal: Negative for abdominal distention, abdominal pain, diarrhea, nausea and vomiting. Genitourinary: Negative for dysuria, flank pain, frequency, genital sores and urgency. Musculoskeletal: Positive for back pain. Skin: Negative for rash. Neurological: Negative for dizziness, syncope and headaches. All other systems reviewed and are negative.       PAST MEDICAL HISTORY     Past Medical History:   Diagnosis Date    Essential hypertension, benign     H/O appendicitis     Hyperlipidemia     Type 2 diabetes mellitus without complication (HCC)     normal 90, checks once a day     Wears dentures      Past Surgical History:   Procedure Laterality Date    COLONOSCOPY N/A 5/22/2017    COLONOSCOPY / BMI / 27 performed by Libia Guerrero MD at [address] FLX DX W/[address] WHEN PFRMD  5/22/2017          Social History     Socioeconomic History    Marital status:      Spouse name: Not on file    Number of children: Not on file    Years of education: Not on file    Highest education level: Not on file   Tobacco Use    Smoking status: Never Smoker    Smokeless tobacco: Never Used   Substance and Sexual Activity    Alcohol use: Yes     Alcohol/week: 0.0 oz     Comment: social, 3 or less per week     Prior to Admission Medications   Prescriptions Last Dose Informant Patient Reported? Taking? Blood-Glucose Meter (ACCU-CHEK CYNTHIA PLUS METER) misc   No No   Sig: Check blood glucose once daily e11.9   FREESTYLE LANCETS 28 gauge misc   Yes No   Sig: TEST BLOOD SUGAR EVERY MORNING AND EVERY EVENING   amLODIPine (NORVASC) 10 mg tablet   No No   Sig: TAKE ONE TABLET BY MOUTH ONE TIME DAILY   glucose blood VI test strips (ACCU-CHEK CYNTHIA PLUS TEST STRP) strip   No No   Sig: Check blood glucose once daily e11.9   glyBURIDE-metFORMIN (GLUCOVANCE) 5-500 mg per tablet   No No   Sig: Take 2 Tabs by mouth two (2) times daily (with meals) for 90 days. hydroCHLOROthiazide (HYDRODIURIL) 25 mg tablet   No No   Sig: TAKE ONE TABLET BY MOUTH ONE TIME DAILY   lancets 30 gauge misc   Yes No   Sig: See Admin Instructions. Use to test blood sugars twice daily. Dx: Type 2 diabetes mellitus without complication (HCC) [G00.3]   losartan (COZAAR) 100 mg tablet   No No   Sig: TAKE ONE TABLET BY MOUTH ONE TIME DAILY   meclizine (ANTIVERT) 25 mg tablet   No No   Sig: Take 1 Tab by mouth three (3) times daily as needed.    pioglitazone (ACTOS) 30 mg tablet   No No   Sig: TAKE ONE TABLET BY MOUTH ONE TIME DAILY   pravastatin (PRAVACHOL) 40 mg tablet No No   Sig: TAKE ONE TABLET BY MOUTH EVERY DAY      Facility-Administered Medications: None     No Known Allergies     PHYSICAL EXAM       Vitals:    03/18/19 1701   BP: 151/89   Pulse: 75   Resp: 16   Temp: 98.2 °F (36.8 °C)   SpO2: 97%    Vital signs were reviewed. Physical Exam   Constitutional: He is oriented to person, place, and time. He appears well-developed and well-nourished. No distress. HENT:   Head: Normocephalic and atraumatic. Eyes: EOM are normal. Pupils are equal, round, and reactive to light. Neck: Normal range of motion. Neck supple. Cardiovascular: Normal rate, regular rhythm, normal heart sounds and intact distal pulses. No murmur heard. Pulmonary/Chest: Effort normal and breath sounds normal. No stridor. No respiratory distress. He has no wheezes. He has no rales. He exhibits no tenderness. Posterior chest wall tenderness left thoracic spine at approximately T6-T8, no crepitus, no deformities   Abdominal: Soft. Bowel sounds are normal. He exhibits no distension and no mass. There is no tenderness. There is no rebound and no guarding. Musculoskeletal: Normal range of motion. He exhibits tenderness. He exhibits no edema or deformity. No step-offs or deformities of the thoracic and lumbar spine, no midline tenderness to palpation, there is tenderness palpation in the left thoracic paraspinous musculature at approximately T7   Lymphadenopathy:     He has no cervical adenopathy. Neurological: He is alert and oriented to person, place, and time. No cranial nerve deficit or sensory deficit. He exhibits normal muscle tone. Coordination normal.   Sensation intact throughout, including in perineum and in S1 distribution. 5+ strength in all distributions including flex/ext at hips and knees bilaterally, and dorsi/plantar flexion of ankles and great toes bilaterally, strength is equal bilaterally   Skin: Skin is warm and dry. No erythema.    Psychiatric: He has a normal mood and affect. His behavior is normal.   Vitals reviewed. MEDICAL DECISION MAKING     Differential Diagnosis: thoracic vertebral fracture, posterior rib fracture, cauda equina, acute cord compression, pneumothorax    MDM  Procedures    ED Course: In talking to the patient, he states that his dizziness is a problem he has dealt with in the past and he does not wish to have this evaluated here in the emergency department. He states he is here for evaluation of his back pain after his fall. 7:36 PM  Xray shows two L rib fx at 6 and 7, right in location of pt discomfort. No PTX, no crepitus. Patient is hemodynamically stable, well-appearing, he's been given an incentive spirometer which he is able to use easily and pulled greater than 1500 mL volumes. we have discussed the treatment plan with the  and reasons to return the emergency Department and he is comfortable with this plan. Disposition:    Diagnosis:    ____________________________________________________________________  A portion of this note was generated using voice recognition dictation software. While the note has been reviewed for accuracy, please note certain words and phrases may not be transcribed as intended that some grammatical and/or typographical errors may be present.

## 2019-03-18 NOTE — ED NOTES
I have reviewed discharge instructions with the patient. The patient verbalized understanding. Patient left ED via Discharge Method: ambulatory to Home with self. Opportunity for questions and clarification provided. Patient given 2 scripts.  present at discharge. To continue your aftercare when you leave the hospital, you may receive an automated call from our care team to check in on how you are doing. This is a free service and part of our promise to provide the best care and service to meet your aftercare needs.  If you have questions, or wish to unsubscribe from this service please call [phone]. Thank you for Choosing our [facility] Emergency Department.

## 2019-03-18 NOTE — DISCHARGE INSTRUCTIONS
1) Place pain patch over area of most pain  2) Take naproxen twice a day for 7 days. Then as needed. Do not take ibuprofen while taking this medications  3) Use the incentive spirometer 10 times an hour, every hour you are awake. You should be able to breath to 1500.  If you drop below 1000, see your primary care doctor  4) Return to the ER for fever, increasing pain, shortness of breath, chest pain, or any other concerns

## 2019-07-30 ENCOUNTER — HOSPITAL ENCOUNTER (OUTPATIENT)
Dept: PHYSICAL THERAPY | Age: 69
Discharge: HOME | End: 2019-07-30
Attending: FAMILY MEDICINE
Payer: MEDICARE

## 2019-07-30 DIAGNOSIS — H81.10 BENIGN PAROXYSMAL POSITIONAL VERTIGO, UNSPECIFIED LATERALITY: ICD-10-CM

## 2019-07-30 PROCEDURE — 97161 PT EVAL LOW COMPLEX 20 MIN: CPT

## 2019-07-30 NOTE — THERAPY EVALUATION
William Canseco  : 1950  Primary: [address] Medic*  Secondary:  [address]  at [place]  [address] [facility], [address] [place] [address] [address] [facility]  [phone]   [phone]        OUTPATIENT PHYSICAL THERAPY:Initial Assessment and Discharge Summary 2019   ICD-10: Treatment Diagnosis: Difficulty in walking, not elsewhere classified (R26.2)  Precautions/Allergies:   Patient has no known allergies. TREATMENT PLAN:  Effective Dates: 2019. Frequency/Duration: One time appointment for initial evaluation. MEDICAL/REFERRING DIAGNOSIS:  Benign paroxysmal positional vertigo, unspecified laterality [H81.10]   DATE OF ONSET: Around a year  REFERRING PHYSICIAN: Kary Houston MD MD Orders: Evaluate and treat   Return MD Appointment: unknown      INITIAL ASSESSMENT:  Mr. Rubin Mcfadden vertigo unable to be provoked with testing. Patient negative for bilateral BPPV. Spoke to patient regarding beginning habituation exercises if certain head or body movements provoke vertigo. Patient agreeable with this plan. No problems or goals stated. Patient discharged from physical therapy. Thank you for this referral.     PROBLEM LIST (Impacting functional limitations):  1. No problems stated INTERVENTIONS PLANNED: (Treatment may consist of any combination of the following)  1. No interventions stated. GOALS: (Goals have been discussed and agreed upon with patient.)  Short-Term Functional Goals: Time Frame:   1. No short term goals stated   Discharge Goals: Time Frame:   1. No discharge goals stated    OUTCOME MEASURE:   Tool Used: Dynamic Gait Index  Score:  Initial:  Most Recent:  (Date: -- )   Interpretation of Score: Each section is scored on a 0-3 scale, 0 representing the patients inability to perform the task and 3 representing independence. The scores of each section are added together for a total score of 24.   Any score below 19 indicates increased risk for falls.    Total Duration:  PT Patient Time In/Time Out  Time In: 0940  Time Out: 3466    Regarding [facility] therapy, I certify that the treatment plan above will be carried out by a therapist or under their direction. Thank you for this referral,  Duane Sandoval PT     Referring Physician Signature: Josemanuel Resendez MD _______________________________ Date _____________     PAIN/SUBJECTIVE:   Initial: Pain Intensity 1: 0  Post Session:  0/10   HISTORY:   History of Injury/Illness (Reason for Referral):  Patient reports insidious onset of vertigo, which began around one year ago. Patient describes room spinning dizziness lasting for several minutes. Patient denies nausea. Patient has no history of inner ear infections. Patient rates current vertigo as 0/10. Past Medical History/Comorbidities:   Mr. Zach Bo  has a past medical history of Essential hypertension, benign, H/O appendicitis, Hyperlipidemia, Type 2 diabetes mellitus without complication ([address] and Wears dentures. He also has no past medical history of Difficult intubation, Malignant hyperthermia due to anesthesia, Nausea & vomiting, or Pseudocholinesterase deficiency. Mr. Zach Bo  has a past surgical history that includes hx appendectomy (1986); pr colonoscopy flx dx w/collj spec when pfrmd (5/22/2017); and colonoscopy (N/A, 5/22/2017). Social History/Living Environment:     Lives with family. Prior Level of Function/Work/Activity:  Independent. Works as . Dominant Side:         RIGHT  Personal Factors:          Sex:  male        Age:  80 y.o. Ambulatory/Rehab Services H2 Model Falls Risk Assessment   Risk Factors:       (1)  Dizziness/Vertigo       (1)  Gender [Male] Ability to Rise from Chair:       (0)  Ability to rise in a single movement   Falls Prevention Plan:       No modifications necessary   Total: (5 or greater = High Risk): 2   ©2010 [employer]. All Rights Reserved. [facility] Patent #[id number]. Federal Law prohibits the replication, distribution or use without written permission from [facility]   Current Medications:       Current Outpatient Medications:     metFORMIN (GLUCOPHAGE) 1,000 mg tablet, Take 1 Tab by mouth two (2) times daily (with meals). , Disp: 180 Tab, Rfl: 3    glucose blood VI test strips (ACCU-CHEK CYNTHIA PLUS TEST STRP) strip, Check blood glucose once daily e11.9, Disp: 100 Strip, Rfl: 3    FREESTYLE LANCETS 28 gauge misc, TEST BLOOD SUGAR EVERY MORNING AND EVERY EVENING, Disp: 100 Lancet, Rfl: 12    pioglitazone (ACTOS) 30 mg tablet, TAKE ONE TABLET BY MOUTH ONE TIME DAILY, Disp: 90 Tab, Rfl: 2    hydroCHLOROthiazide (HYDRODIURIL) 25 mg tablet, TAKE ONE TABLET BY MOUTH ONE TIME DAILY, Disp: 90 Tab, Rfl: 3    amLODIPine (NORVASC) 10 mg tablet, TAKE ONE TABLET BY MOUTH ONE TIME DAILY, Disp: 90 Tab, Rfl: 3    losartan (COZAAR) 100 mg tablet, TAKE ONE TABLET BY MOUTH ONE TIME DAILY, Disp: 90 Tab, Rfl: 3    pravastatin (PRAVACHOL) 40 mg tablet, TAKE ONE TABLET BY MOUTH EVERY DAY, Disp: 90 Tab, Rfl: 3    meclizine (ANTIVERT) 25 mg tablet, Take 1 Tab by mouth three (3) times daily as needed. , Disp: 90 Tab, Rfl: 3    Blood-Glucose Meter (ACCU-CHEK CYNTHIA PLUS METER) misc, Check blood glucose once daily e11.9, Disp: 1 Each, Rfl: 0    lancets 30 gauge misc, See Admin Instructions. Use to test blood sugars twice daily. Dx: Type [facility] diabetes mellitus without complication ([facility] [P40.4], Disp: , Rfl:    Date Last Reviewed:  7/30/2019   Number of Personal Factors/Comorbidities that affect the Plan of Care: 0: LOW COMPLEXITY   EXAMINATION:   Postural Control & Balance:  · Graham Balance Scale:  Not tested. · Dynamic Gait Index:  23/24.   (A score less than or equal to19/24 is abnormal and predictive of falls). Patient has no complaints of vertigo with testing. · [employer]ensory [employer] Test:  Not tested.        Position Tests:  · [place] testing presented as negative indicating that Benign Paroxysmal Positional Vertigo (BPPV) is absent bilaterally. Patient performed the following movements with no complaints of vertigo:  Figure eights x 2 reps, circles right/circles left x 2 reps    Body Structures Involved:  1. None Body Functions Affected:  1. None Activities and Participation Affected:  1.  None   Number of elements (examined above) that affect the Plan of Care: 1-2: LOW COMPLEXITY   CLINICAL PRESENTATION:   Presentation: Stable and uncomplicated: LOW COMPLEXITY   CLINICAL DECISION MAKING:   Use of outcome tool(s) and clinical judgement create a POC that gives a: Clear prediction of patient's progress: LOW COMPLEXITY

## 2021-09-26 ENCOUNTER — APPOINTMENT (OUTPATIENT)
Dept: GENERAL RADIOLOGY | Age: 71
DRG: 643 | End: 2021-09-26
Attending: STUDENT IN AN ORGANIZED HEALTH CARE EDUCATION/TRAINING PROGRAM
Payer: MEDICARE

## 2021-09-26 ENCOUNTER — APPOINTMENT (OUTPATIENT)
Dept: CT IMAGING | Age: 71
DRG: 643 | End: 2021-09-26
Attending: EMERGENCY MEDICINE
Payer: MEDICARE

## 2021-09-26 ENCOUNTER — HOSPITAL ENCOUNTER (INPATIENT)
Age: 71
LOS: 5 days | Discharge: HOME | DRG: 643 | End: 2021-10-01
Attending: STUDENT IN AN ORGANIZED HEALTH CARE EDUCATION/TRAINING PROGRAM | Admitting: INTERNAL MEDICINE
Payer: MEDICARE

## 2021-09-26 DIAGNOSIS — S20.211A CONTUSION OF RIB ON RIGHT SIDE, INITIAL ENCOUNTER: ICD-10-CM

## 2021-09-26 DIAGNOSIS — E87.1 HYPONATREMIA: Primary | ICD-10-CM

## 2021-09-26 DIAGNOSIS — E87.6 HYPOKALEMIA: ICD-10-CM

## 2021-09-26 PROBLEM — Z91.81 HISTORY OF RECENT FALL: Status: ACTIVE | Noted: 2021-09-26

## 2021-09-26 PROBLEM — E86.9 VOLUME DEPLETION: Status: ACTIVE | Noted: 2021-09-26

## 2021-09-26 PROBLEM — G93.41 ACUTE METABOLIC ENCEPHALOPATHY: Status: ACTIVE | Noted: 2021-09-26

## 2021-09-26 PROBLEM — S20.219A RIB CONTUSION: Status: ACTIVE | Noted: 2021-09-26

## 2021-09-26 PROBLEM — D72.829 LEUKOCYTOSIS: Status: ACTIVE | Noted: 2021-09-26

## 2021-09-26 LAB
ALBUMIN SERPL-MCNC: 4.2 G/DL (ref 3.2–4.6)
ALBUMIN/GLOB SERPL: 0.8 {RATIO} (ref 1.2–3.5)
ALP SERPL-CCNC: 64 U/L (ref 50–136)
ALT SERPL-CCNC: 19 U/L (ref 12–65)
AMMONIA PLAS-SCNC: 29 UMOL/L (ref 24.9–68)
ANION GAP SERPL CALC-SCNC: 14 MMOL/L (ref 7–16)
ANION GAP SERPL CALC-SCNC: 15 MMOL/L (ref 7–16)
APPEARANCE UR: CLEAR
AST SERPL-CCNC: 25 U/L (ref 15–37)
BASOPHILS # BLD: 0 K/UL (ref 0–0.2)
BASOPHILS NFR BLD: 0 % (ref 0–2)
BILIRUB SERPL-MCNC: 1.2 MG/DL (ref 0.2–1.1)
BILIRUB UR QL: NEGATIVE
BUN SERPL-MCNC: 37 MG/DL (ref 8–23)
BUN SERPL-MCNC: 39 MG/DL (ref 8–23)
CALCIUM SERPL-MCNC: 8.1 MG/DL (ref 8.3–10.4)
CALCIUM SERPL-MCNC: 8.2 MG/DL (ref 8.3–10.4)
CHLORIDE SERPL-SCNC: 88 MMOL/L (ref 98–107)
CHLORIDE SERPL-SCNC: 90 MMOL/L (ref 98–107)
CO2 SERPL-SCNC: 12 MMOL/L (ref 21–32)
CO2 SERPL-SCNC: 12 MMOL/L (ref 21–32)
COLOR UR: YELLOW
CREAT SERPL-MCNC: 1.26 MG/DL (ref 0.8–1.5)
CREAT SERPL-MCNC: 1.43 MG/DL (ref 0.8–1.5)
DIFFERENTIAL METHOD BLD: ABNORMAL
EOSINOPHIL # BLD: 0 K/UL (ref 0–0.8)
EOSINOPHIL NFR BLD: 0 % (ref 0.5–7.8)
ERYTHROCYTE [DISTWIDTH] IN BLOOD BY AUTOMATED COUNT: 11.8 % (ref 11.9–14.6)
GLOBULIN SER CALC-MCNC: 5 G/DL (ref 2.3–3.5)
GLUCOSE BLD STRIP.AUTO-MCNC: 139 MG/DL (ref 65–100)
GLUCOSE BLD STRIP.AUTO-MCNC: 156 MG/DL (ref 65–100)
GLUCOSE BLD STRIP.AUTO-MCNC: 190 MG/DL (ref 65–100)
GLUCOSE SERPL-MCNC: 133 MG/DL (ref 65–100)
GLUCOSE SERPL-MCNC: 173 MG/DL (ref 65–100)
GLUCOSE UR STRIP.AUTO-MCNC: 250 MG/DL
HCT VFR BLD AUTO: 38.3 % (ref 41.1–50.3)
HGB BLD-MCNC: 13.7 G/DL (ref 13.6–17.2)
HGB UR QL STRIP: NEGATIVE
IMM GRANULOCYTES # BLD AUTO: 0.1 K/UL (ref 0–0.5)
IMM GRANULOCYTES NFR BLD AUTO: 1 % (ref 0–5)
KETONES UR QL STRIP.AUTO: 15 MG/DL
LEUKOCYTE ESTERASE UR QL STRIP.AUTO: NEGATIVE
LYMPHOCYTES # BLD: 1.2 K/UL (ref 0.5–4.6)
LYMPHOCYTES NFR BLD: 6 % (ref 13–44)
MCH RBC QN AUTO: 28.8 PG (ref 26.1–32.9)
MCHC RBC AUTO-ENTMCNC: 35.8 G/DL (ref 31.4–35)
MCV RBC AUTO: 80.5 FL (ref 79.6–97.8)
MONOCYTES # BLD: 2 K/UL (ref 0.1–1.3)
MONOCYTES NFR BLD: 11 % (ref 4–12)
NEUTS SEG # BLD: 15.3 K/UL (ref 1.7–8.2)
NEUTS SEG NFR BLD: 82 % (ref 43–78)
NITRITE UR QL STRIP.AUTO: NEGATIVE
NRBC # BLD: 0 K/UL (ref 0–0.2)
OSMOLALITY SERPL: 250 MOSM/KG H2O (ref 280–301)
OSMOLALITY UR: 556 MOSM/KG H2O (ref 50–1400)
PH UR STRIP: 5.5 [PH] (ref 5–9)
PLATELET # BLD AUTO: 306 K/UL (ref 150–450)
PMV BLD AUTO: 9.4 FL (ref 9.4–12.3)
POTASSIUM SERPL-SCNC: 2.5 MMOL/L (ref 3.5–5.1)
POTASSIUM SERPL-SCNC: 2.7 MMOL/L (ref 3.5–5.1)
PROCALCITONIN SERPL-MCNC: 0.19 NG/ML
PROT SERPL-MCNC: 9.2 G/DL (ref 6.3–8.2)
PROT UR STRIP-MCNC: NEGATIVE MG/DL
RBC # BLD AUTO: 4.76 M/UL (ref 4.23–5.6)
SERVICE CMNT-IMP: ABNORMAL
SODIUM SERPL-SCNC: 115 MMOL/L (ref 136–145)
SODIUM SERPL-SCNC: 116 MMOL/L (ref 136–145)
SODIUM UR-SCNC: 46 MMOL/L
SP GR UR REFRACTOMETRY: 1.02 (ref 1–1.02)
UROBILINOGEN UR QL STRIP.AUTO: 0.2 EU/DL (ref 0.2–1)
WBC # BLD AUTO: 18.7 K/UL (ref 4.3–11.1)

## 2021-09-26 PROCEDURE — 65660000000 HC RM CCU STEPDOWN

## 2021-09-26 PROCEDURE — 81003 URINALYSIS AUTO W/O SCOPE: CPT

## 2021-09-26 PROCEDURE — 99284 EMERGENCY DEPT VISIT MOD MDM: CPT

## 2021-09-26 PROCEDURE — 74011250637 HC RX REV CODE- 250/637: Performed by: INTERNAL MEDICINE

## 2021-09-26 PROCEDURE — 84145 PROCALCITONIN (PCT): CPT

## 2021-09-26 PROCEDURE — 72125 CT NECK SPINE W/O DYE: CPT

## 2021-09-26 PROCEDURE — 70450 CT HEAD/BRAIN W/O DYE: CPT

## 2021-09-26 PROCEDURE — 74011250636 HC RX REV CODE- 250/636: Performed by: INTERNAL MEDICINE

## 2021-09-26 PROCEDURE — 82962 GLUCOSE BLOOD TEST: CPT

## 2021-09-26 PROCEDURE — 83935 ASSAY OF URINE OSMOLALITY: CPT

## 2021-09-26 PROCEDURE — 85025 COMPLETE CBC W/AUTO DIFF WBC: CPT

## 2021-09-26 PROCEDURE — 82140 ASSAY OF AMMONIA: CPT

## 2021-09-26 PROCEDURE — 94760 N-INVAS EAR/PLS OXIMETRY 1: CPT

## 2021-09-26 PROCEDURE — 74011250637 HC RX REV CODE- 250/637: Performed by: STUDENT IN AN ORGANIZED HEALTH CARE EDUCATION/TRAINING PROGRAM

## 2021-09-26 PROCEDURE — 74011250637 HC RX REV CODE- 250/637: Performed by: FAMILY MEDICINE

## 2021-09-26 PROCEDURE — 83930 ASSAY OF BLOOD OSMOLALITY: CPT

## 2021-09-26 PROCEDURE — 71046 X-RAY EXAM CHEST 2 VIEWS: CPT

## 2021-09-26 PROCEDURE — 96374 THER/PROPH/DIAG INJ IV PUSH: CPT

## 2021-09-26 PROCEDURE — 84300 ASSAY OF URINE SODIUM: CPT

## 2021-09-26 PROCEDURE — 74011250636 HC RX REV CODE- 250/636: Performed by: STUDENT IN AN ORGANIZED HEALTH CARE EDUCATION/TRAINING PROGRAM

## 2021-09-26 PROCEDURE — 80053 COMPREHEN METABOLIC PANEL: CPT

## 2021-09-26 PROCEDURE — 74011636637 HC RX REV CODE- 636/637: Performed by: INTERNAL MEDICINE

## 2021-09-26 RX ORDER — POLYETHYLENE GLYCOL 3350 17 G/17G
17 POWDER, FOR SOLUTION ORAL DAILY PRN
Status: DISCONTINUED | OUTPATIENT
Start: 2021-09-26 | End: 2021-10-01 | Stop reason: HOSPADM

## 2021-09-26 RX ORDER — POTASSIUM CHLORIDE 20 MEQ/1
40 TABLET, EXTENDED RELEASE ORAL
Status: COMPLETED | OUTPATIENT
Start: 2021-09-26 | End: 2021-09-26

## 2021-09-26 RX ORDER — MAGNESIUM SULFATE HEPTAHYDRATE 40 MG/ML
2 INJECTION, SOLUTION INTRAVENOUS
Status: COMPLETED | OUTPATIENT
Start: 2021-09-26 | End: 2021-09-26

## 2021-09-26 RX ORDER — POTASSIUM CHLORIDE 7.45 MG/ML
10 INJECTION INTRAVENOUS
Status: COMPLETED | OUTPATIENT
Start: 2021-09-26 | End: 2021-09-26

## 2021-09-26 RX ORDER — PRAVASTATIN SODIUM 20 MG/1
40 TABLET ORAL DAILY
Status: DISCONTINUED | OUTPATIENT
Start: 2021-09-27 | End: 2021-10-01 | Stop reason: HOSPADM

## 2021-09-26 RX ORDER — AMLODIPINE BESYLATE 10 MG/1
10 TABLET ORAL DAILY
Status: DISCONTINUED | OUTPATIENT
Start: 2021-09-27 | End: 2021-10-01 | Stop reason: HOSPADM

## 2021-09-26 RX ORDER — LOSARTAN POTASSIUM 50 MG/1
100 TABLET ORAL DAILY
Status: DISCONTINUED | OUTPATIENT
Start: 2021-09-26 | End: 2021-10-01

## 2021-09-26 RX ORDER — ACETAMINOPHEN 650 MG/1
650 SUPPOSITORY RECTAL
Status: DISCONTINUED | OUTPATIENT
Start: 2021-09-26 | End: 2021-10-01 | Stop reason: HOSPADM

## 2021-09-26 RX ORDER — ONDANSETRON 2 MG/ML
4 INJECTION INTRAMUSCULAR; INTRAVENOUS
Status: DISCONTINUED | OUTPATIENT
Start: 2021-09-26 | End: 2021-10-01 | Stop reason: HOSPADM

## 2021-09-26 RX ORDER — ACETAMINOPHEN 325 MG/1
650 TABLET ORAL
Status: DISCONTINUED | OUTPATIENT
Start: 2021-09-26 | End: 2021-10-01 | Stop reason: HOSPADM

## 2021-09-26 RX ORDER — INSULIN LISPRO 100 [IU]/ML
INJECTION, SOLUTION INTRAVENOUS; SUBCUTANEOUS
Status: DISCONTINUED | OUTPATIENT
Start: 2021-09-26 | End: 2021-10-01 | Stop reason: HOSPADM

## 2021-09-26 RX ORDER — FLUTICASONE PROPIONATE 50 MCG
2 SPRAY, SUSPENSION (ML) NASAL DAILY
Status: DISCONTINUED | OUTPATIENT
Start: 2021-09-27 | End: 2021-10-01 | Stop reason: HOSPADM

## 2021-09-26 RX ORDER — SODIUM CHLORIDE 9 MG/ML
75 INJECTION, SOLUTION INTRAVENOUS CONTINUOUS
Status: DISCONTINUED | OUTPATIENT
Start: 2021-09-26 | End: 2021-09-27

## 2021-09-26 RX ORDER — ONDANSETRON 4 MG/1
4 TABLET, ORALLY DISINTEGRATING ORAL
Status: DISCONTINUED | OUTPATIENT
Start: 2021-09-26 | End: 2021-10-01 | Stop reason: HOSPADM

## 2021-09-26 RX ORDER — SODIUM CHLORIDE 0.9 % (FLUSH) 0.9 %
5-40 SYRINGE (ML) INJECTION AS NEEDED
Status: DISCONTINUED | OUTPATIENT
Start: 2021-09-26 | End: 2021-10-01 | Stop reason: HOSPADM

## 2021-09-26 RX ORDER — ACETAZOLAMIDE 250 MG/1
250 TABLET ORAL 4 TIMES DAILY
COMMUNITY
End: 2021-10-01

## 2021-09-26 RX ORDER — POTASSIUM CHLORIDE 20 MEQ/1
40 TABLET, EXTENDED RELEASE ORAL
Status: DISPENSED | OUTPATIENT
Start: 2021-09-26 | End: 2021-09-26

## 2021-09-26 RX ORDER — SODIUM CHLORIDE 0.9 % (FLUSH) 0.9 %
5-40 SYRINGE (ML) INJECTION EVERY 8 HOURS
Status: DISCONTINUED | OUTPATIENT
Start: 2021-09-26 | End: 2021-10-01 | Stop reason: HOSPADM

## 2021-09-26 RX ADMIN — MAGNESIUM SULFATE HEPTAHYDRATE 2 G: 40 INJECTION, SOLUTION INTRAVENOUS at 17:19

## 2021-09-26 RX ADMIN — LOSARTAN POTASSIUM 100 MG: 50 TABLET, FILM COATED ORAL at 17:19

## 2021-09-26 RX ADMIN — ACETAMINOPHEN 650 MG: 325 TABLET, FILM COATED ORAL at 16:26

## 2021-09-26 RX ADMIN — INSULIN LISPRO 2 UNITS: 100 INJECTION, SOLUTION INTRAVENOUS; SUBCUTANEOUS at 21:44

## 2021-09-26 RX ADMIN — SODIUM CHLORIDE 75 ML/HR: 900 INJECTION, SOLUTION INTRAVENOUS at 15:47

## 2021-09-26 RX ADMIN — POTASSIUM CHLORIDE 40 MEQ: 20 TABLET, EXTENDED RELEASE ORAL at 21:44

## 2021-09-26 RX ADMIN — Medication 10 ML: at 21:45

## 2021-09-26 RX ADMIN — POTASSIUM CHLORIDE 10 MEQ: 7.46 INJECTION, SOLUTION INTRAVENOUS at 15:12

## 2021-09-26 RX ADMIN — Medication 10 ML: at 17:18

## 2021-09-26 RX ADMIN — POTASSIUM CHLORIDE 40 MEQ: 20 TABLET, EXTENDED RELEASE ORAL at 17:19

## 2021-09-26 NOTE — ACP (ADVANCE CARE PLANNING)
[employer] [facility]  At the heart of better care     Advance Care Planning   Admit Date:  2021 12:49 PM   Name:  Balbina Nath   Age:  80 y.o. Sex:  male  :  1950   MRN:  [id number]   Room:  MOORE/A    Balbina Nath is able to make his own decisions: Yes     Names of POA/surrogate decision maker when patient is altered:  Gagan Manzo    Other members present in the meeting:   Niece. Patient / surrogate decision-maker directed:  Code Status - Detailed ([employer]): FULL CODE -full aggressive medical and surgical interventions, including intubations, resuscitations, pressors, artificial tube feeding    Time spent: 31 minutes in direct discussion (face to face and/or over phone).     Signed:  Carlos Eduardo Hendricks MD

## 2021-09-26 NOTE — H&P
[facility]  History and Physical    Patient ID:  July Patel  male  1950  [id number]    Admission Date: 9/26/2021  Chief Complaint: Confusion for the past couple of days  Reason for Admission: Hyponatremia, acute metabolic encephalopathy, hypokalemia. ASSESSMENT & PLAN:    Hyponatremia  Acute metabolic encephalopathy  Likely secondary to chronic use of hydrochlorothiazide, with the setting of poor oral intake. Likely be responsible for his confusion, and acute metabolic encephalopathy. We will give gentle IV hydration with normal saline. We will check serum and urine osmolality. We will check TSH. If lab data from above do not explain cause of hyponatremia, will pursue further testing. Monitor BMP every 6 hours for the next 24 hours for now. Avoid too rapid correction of sodium. Monitor symptoms. Monitor neurological assessment. Hypokalemia  Hypochloremia  Likely associated with volume depletion. In the setting of chronic use of hydrochlorothiazide. We will give normal saline. Monitor symptoms. Monitor BMP    Hypertension  Resume home medications. Monitor blood pressure    Diabetes mellitus type 2  Give diabetic diet  Check fingerstick blood sugar before each meal and at bedtime  Cover with insulin accordingly    Disposition: Home in 2 or 3 days  Diet: Diabetic diet  VTE ppx: SCD  GI ppx: None at this time  CODE STATUS: Full code. Surrogate decision-maker: Son. His name is [address]. Patient requires hospital stay as an in-patient and anticipated stay is more than 2 midnights due to the serious nature of the illness. I have discussed with patient's niece regarding advance directive. She would like to have a full-code status. I have discussed the plan of care with patient and niece. Patient has pain now. Pain medications are ordered and patient will be monitored and followed for response. HISTORY OF PRESENT ILLNESS:  Patient is a 80 y.o. male with medical h/o   Hypertension  Dyslipidemia  Diabetes mellitus type 2    Patient has been in his usual state of health, until 4 days ago when he was walking upstairs and slipped and fell. He hit his head. There is no report of loss of consciousness. There was no witness at the time. Patient was brought to an urgent care at that time. Evaluation showed that he had rib contusion according to family member's account. He is accompanied today by his niece who help with information. Since the fall he has been weak. He has not been eating well. Niece reports that patient is confused off and on. There is no report of abnormal motor movement or seizure. No projectile vomiting. Patient reports no headache. No shortness of breath. No chest pain. Patient was brought to emergency room today. He was found to have sodium of 115, with low potassium. Also with elevated white blood cells. There is no history of fever, no chills. Patient was given 8 M EQ of potassium IV, and 40 M EQ of potassium p.o. for 2 doses in ER. Hospitalist service is requested to admit the patient. No Known Allergies    Prior to Admission Medications   Prescriptions Last Dose Informant Patient Reported? Taking? Blood-Glucose Meter (ACCU-CHEK CYNTHIA PLUS METER) misc   No No   Sig: Check blood glucose once daily e11.9   DurezoL 0.05 % ophthalmic emulsion   Yes No   FREESTYLE LANCETS 28 gauge [facility]   No No   Sig: TEST BLOOD SUGAR EVERY MORNING AND EVERY EVENING   amLODIPine (NORVASC) 10 mg tablet   No No   Sig: TAKE ONE TABLET BY MOUTH ONE TIME DAILY   fluticasone propionate (FLONASE) 50 mcg/actuation nasal spray   No No   Si Sprays by Both Nostrils route daily.    glucose blood VI test strips (Accu-Chek Cynthia Plus test strp) strip   No No   Sig: Check blood glucose once daily e11.9   hydroCHLOROthiazide (HYDRODIURIL) 25 mg tablet   No No   Sig: TAKE ONE TABLET BY MOUTH ONE TIME DAILY as needed   lancets 30 gauge misc   Yes No   Sig: See Admin Instructions. Use to test blood sugars twice daily. Dx: Type 2 diabetes mellitus without complication (HCC) [E12.8]   losartan (COZAAR) 100 mg tablet   No No   Sig: TAKE ONE TABLET BY MOUTH ONE TIME DAILY   meclizine (ANTIVERT) 25 mg tablet   No No   Sig: Take 1 Tab by mouth three (3) times daily as needed for Dizziness. pioglitazone (ACTOS) 30 mg tablet   No No   Sig: TAKE ONE TABLET BY MOUTH ONE TIME DAILY   pravastatin (PRAVACHOL) 40 mg tablet   No No   Sig: TAKE ONE TABLET BY MOUTH ONE TIME DAILY      Facility-Administered Medications: None       Past Medical History:   Diagnosis Date    Essential hypertension, benign     H/O appendicitis     Hyperlipidemia     Type 2 diabetes mellitus without complication (HCC)     normal 90, checks once a day     Wears dentures      Past Surgical History:   Procedure Laterality Date    COLONOSCOPY N/A 5/22/2017    COLONOSCOPY / BMI / 27 performed by Juan Diego Mcduffie MD at [address] FLX DX W/SARA [place] 1977 PFRMD  5/22/2017            Social History     Tobacco Use    Smoking status: Never Smoker    Smokeless tobacco: Never Used   Substance Use Topics    Alcohol use: Yes     Alcohol/week: 0.0 standard drinks     Comment: social, 3 or less per week       FAMILY HISTORY:    Family History   Problem Relation Age of Onset    Diabetes Sister         type II    Diabetes Brother         type II       REVIEW OF SYSTEMS:  A 14 point review of systems was taken and pertinent positive as per HPI. PHYSICAL EXAM:    Visit Vitals  BP (!) 151/80   Pulse 83   Temp 98.7 °F (37.1 °C)   Resp 20   Ht 5' 6\" (1.676 m)   Wt 74.4 kg (164 lb)   SpO2 100%   BMI 26.47 kg/m²       General: No acute distress, speaking in full sentences, no use of accessory muscles. Patient speaks [place]. He has limited command of [place]. Needs helps with information.   Patient complains of pain in the right lower rib cage. HEENT: Pupils equal and reactive to light and accommodation, oropharynx is clear   Neck: Supple, no lymphadenopathy, no JVD   Lungs: Clear to auscultation bilaterally   Cardiovascular: Regular rate and rhythm with normal S1 and S2   Abdomen: Soft, nontender, mildly distended, normoactive bowel sounds   Extremities: No cyanosis clubbing or edema   Neuro: Nonfocal, A&O x3   Psych: Normal mood and affect     Intake/Output last 3 shifts:        Labs:  CMP:   Lab Results   Component Value Date/Time     (LL) 09/26/2021 02:06 PM    K 2.7 (L) 09/26/2021 02:06 PM    CL 88 (L) 09/26/2021 02:06 PM    CO2 12 (L) 09/26/2021 02:06 PM    AGAP 15 09/26/2021 02:06 PM     (H) 09/26/2021 02:06 PM    BUN 37 (H) 09/26/2021 02:06 PM    CREA 1.43 09/26/2021 02:06 PM    GFRAA >60 09/26/2021 02:06 PM    GFRNA 52 (L) 09/26/2021 02:06 PM    CA 8.1 (L) 09/26/2021 02:06 PM    ALB 4.2 09/26/2021 02:06 PM    TBILI 1.2 (H) 09/26/2021 02:06 PM    TP 9.2 (H) 09/26/2021 02:06 PM    GLOB 5.0 (H) 09/26/2021 02:06 PM    AGRAT 0.8 (L) 09/26/2021 02:06 PM    ALT 19 09/26/2021 02:06 PM         CBC:    Lab Results   Component Value Date/Time    WBC 18.7 (H) 09/26/2021 02:06 PM    HGB 13.7 09/26/2021 02:06 PM    HCT 38.3 (L) 09/26/2021 02:06 PM     09/26/2021 02:06 PM       No results found for: INR, PTMR, PTP, PT1, PT2, INREXT    ABG:  No results found for: PH, PHI, PCO2, PCO2I, PO2, PO2I, HCO3, HCO3I, FIO2, FIO2I        No results found for: CPK, RCK1, RCK2, RCK3, RCK4, CKMB, CKNDX, CKND1, TROPT, TROIQ, BNPP, BNP      Imaging & Other Studies:    XR Results (maximum last 3): Results from [place] encounter on 09/26/21    XR CHEST PA LAT    Narrative  EXAM: 2 view chest radiograph. INDICATION: Right-sided rib pain. COMPARISON: Chest radiograph dated March 18, 2019. FINDINGS:  No focal lung consolidation. No pneumothorax. No pleural effusion. The heart is  normal in size.  No evidence of acute osseous abnormality. Impression  1. No acute cardiopulmonary abnormality. Results from [place] encounter on 03/18/19    XR RIBS BI W PA CHEST 4 VS    Narrative  7-VIEW BILATERAL RIBS WITH CHEST:    CLINICAL HISTORY:  Rib pain after fall. COMPARISON:  None. FINDINGS:  Detailed views of the ribs demonstrate acute angulation of the  cortices of the left 6th rib anteriorly and the left seventh rib  anterolaterally, suggesting nondisplaced fractures. No definite right rib  fracture is identified. No radiopaque foreign body is evident. No definite  pneumothorax or pleural fluid is seen. Impression  IMPRESSION:  Nondisplaced anterior fractures of left ribs 6 and 7. XR SPINE THORAC 2 V    Narrative  THREE-VIEW THORACIC SPINE:    CLINICAL HISTORY:  Midthoracic back pain, worse on the left, and all  demonstrated. FINDINGS:  AP, lateral, and swimmer's views demonstrate no definite acute  fracture, malalignment, or aggressive bone destruction. There is no significant  spondylosis. There is mild multilevel spondylosis with marginal spurring. Impression  IMPRESSION:  NO ACUTE ABNORMALITY. CT Results (maximum last 3): Results from [place] encounter on 09/26/21    CT SPINE CERV WO CONT    Narrative  EXAM: CT of the cervical spine. INDICATION: Fall today. Upper right-sided back pain. COMPARISON: None. Multiple axial images were obtained through the cervical spine without  intravenous contrast. Coronal and sagittal reformatted images were also  reviewed. Radiation dose reduction techniques were used for this study: All CT  scans performed at this facility use one or all of the following: Automated  exposure control, adjustment of the mA and/or kVp according to patient's size,  iterative reconstruction. 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 Diffuse osteopenia. Chronic ossification of the nuchal ligament. Moderate  multilevel degenerative disc changes most advanced at C6-C7. No significant  spondylolisthesis. No evidence of acute cervical spine fracture. No prevertebral  soft tissue swelling. Small right occipital and left occipital scalp hematomas. Impression  1. No evidence of acute cervical spine fracture. 2. Moderate multilevel degenerative disc changes. 3. 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  4. Small bilateral occipital scalp hematomas. CT HEAD WO CONT    Narrative  EXAM: CT head without contrast.  INDICATION: Falls today. COMPARISON: Head CT dated December 17, 2018. Multiple axial images obtained through the brain without intravenous contrast.  Radiation dose reduction techniques were used for this study: All CT scans  performed at this facility use one or all of the following: Automated exposure  control, adjustment of the mA and/or kVp according to patient's size, iterative  reconstruction. FINDINGS:  Diffuse cerebral atrophy with commensurate ex vacuo dilatation of the  ventricles. Scattered periventricular and centrum semiovale white matter  hypointensities most indicative of chronic ischemic white matter change. No  evidence of intracranial mass, hemorrhage, or large territorial infarct. The  basal cisterns are patent. No extra-axial fluid collection or mass effect. The orbital contents are within normal limits. The paranasal sinuses are clear. The mastoid air cells and middle ears are clear. No significant osseous or  extracranial soft tissue lesions. Impression  1. No evidence of acute intracranial abnormality. Chronic changes as above. Results from [place] encounter on 12/17/18    CT HEAD WO CONT    Narrative  CT HEAD WITHOUT CONTRAST    HISTORY:  Head trauma. COMPARISON: None    TECHNIQUE: Axial imaging was performed without intravenous contrast utilizing  5mm slice thickness.  Sagittal and coronal reformats were performed. Radiation  dose reduction techniques were used for this study. Our CT scanner uses one or  all of the following:    Automated exposure control, adjustment of the MAS or KUB according to patient's  size and iterative reconstruction. FINDINGS:    *BRAIN:  -  There are no early signs of territorial or lacunar infarction by CT.  -  No intracranial mass, hematoma, or hydrocephalus. -  For patient's age, the scattered areas of white matter hypodensities may  represent a chronic small vessel white matter ischemia. However this is  nonspecific. *VISUALIZED PARANASAL SINUSES: Mild chronic bilateral maxillary sinusitis. *MASTOIDS:  Clear. *CALVARIUM AND SCALP: Unremarkable. Impression  IMPRESSION:    No evidence of acute intracranial trauma. Mild chronic bilateral maxillary sinusitis. Date of Dictation: 12/17/2018 9:36 PM      MRI Results (maximum last 3): No results found for this or any previous visit. Nuclear Medicine Results (maximum last 3): No results found for this or any previous visit. US Results (maximum last 3): No results found for this or any previous visit. DEXA Results (maximum last 3): No results found for this or any previous visit. RADHIKA Results (maximum last 3): No results found for this or any previous visit. IR Results (maximum last 3): No results found for this or any previous visit. VAS/US Results (maximum last 3): No results found for this or any previous visit. PET Results (maximum last 3): No results found for this or any previous visit.          EKG Results     None          Active Problems:  Patient Active Problem List    Diagnosis Date Noted    Acute hyponatremia 09/26/2021    Acute metabolic encephalopathy 03/96/6548    Hypokalemia 09/26/2021    Leukocytosis 09/26/2021    Volume depletion 09/26/2021    Rib contusion 09/26/2021    History of recent fall 09/26/2021    Hyponatremia 09/26/2021    Type 2 diabetes with nephropathy ([facility] [place] [address] 04/04/2018    Benign paroxysmal positional vertigo 11/03/2017    Pure hypercholesterolemia 05/24/2016    Essential hypertension, benign          Nae Zheng MD  [facility] Hospitalist Service  9/26/2021 3:48 PM

## 2021-09-26 NOTE — ED PROVIDER NOTES
81-year-old male presents ER with right rib pain from a fall that occurred 3 days ago. Family at bedside assist with history gathering secondary to patient's language barrier. Niece who is at bedside reports patient had a fall 3 days ago unwitnessed, went to local urgent care diagnosed with rib contusion at that time. Reports since then has had intermittent episodes of confusion, worse in the evenings. Reports he also has had intermittent episodes of nausea and vomiting with decreased p.o. intake. Has been consuming clear liquids. Denies abnormal gait or focal weakness. Patient denies abdominal pain, diarrhea, fever or chills. Denies nausea or vomiting currently. Past Medical History:   Diagnosis Date    Essential hypertension, benign     H/O appendicitis     Hyperlipidemia     Type 2 diabetes mellitus without complication (HCC)     normal 90, checks once a day     Wears dentures        Past Surgical History:   Procedure Laterality Date    COLONOSCOPY N/A 5/22/2017    COLONOSCOPY / BMI / 27 performed by Humphrey Samaniego MD at [address] [facility] [address] FLX DX W/[address] [facility] [address] PFRMD  5/22/2017              Family History:   Problem Relation Age of Onset    Diabetes Sister         type II    Diabetes Brother         type II       Social History     Socioeconomic History    Marital status:      Spouse name: Not on file    Number of children: Not on file    Years of education: Not on file    Highest education level: Not on file   Occupational History    Not on file   Tobacco Use    Smoking status: Never Smoker    Smokeless tobacco: Never Used   Substance and Sexual Activity    Alcohol use:  Yes     Alcohol/week: 0.0 standard drinks     Comment: social, 3 or less per week    Drug use: Not on file    Sexual activity: Not on file   Other Topics Concern    Not on file   Social History Narrative    Not on file     Social Determinants of Health Financial Resource Strain:     Difficulty of Paying Living Expenses:    Food Insecurity:     Worried About Running Out of Food in the Last Year:     [address] in the Last Year:    Transportation Needs:     Lack of Transportation (Medical):  Lack of Transportation (Non-Medical):    Physical Activity:     Days of Exercise per Week:     Minutes of Exercise per Session:    Stress:     Feeling of Stress :    Social Connections:     Frequency of Communication with Friends and Family:     Frequency of Social Gatherings with Friends and Family:     Attends Worship Services:     Active Member of Clubs or Organizations:     Attends Club or Organization Meetings:     Marital Status:    Intimate Partner Violence:     Fear of Current or Ex-Partner:     Emotionally Abused:     Physically Abused:     Sexually Abused: ALLERGIES: Patient has no known allergies. Review of Systems   Constitutional: Negative for chills and fever. HENT: Negative for congestion and sore throat. Eyes: Negative for visual disturbance. Respiratory: Negative for cough and shortness of breath. Cardiovascular: Negative for chest pain. Gastrointestinal: Negative for abdominal pain, diarrhea, nausea and vomiting. Endocrine: Negative for polyuria. Genitourinary: Negative for difficulty urinating and dysuria. Musculoskeletal: Positive for myalgias. Negative for neck pain and neck stiffness. Skin: Negative for rash. Neurological: Negative for weakness and headaches. Psychiatric/Behavioral: Positive for confusion. All other systems reviewed and are negative. Vitals:    09/26/21 1240   BP: (!) 151/80   Pulse: 83   Resp: 20   Temp: 98.7 °F (37.1 °C)   SpO2: 100%   Weight: 74.4 kg (164 lb)   Height: 5' 6\" (1.676 m)            Physical Exam  Vitals and nursing note reviewed. Constitutional:       Appearance: Normal appearance. HENT:      Head: Normocephalic and atraumatic.       Nose: Nose normal.      Mouth/Throat:      Mouth: Mucous membranes are moist.   Eyes:      Extraocular Movements: Extraocular movements intact. Comments: Left eye: Pupils nonreactive, baseline per patient, history of glaucoma    Right: Extraocular muscle movement intact, pupil round and reactive. Cardiovascular:      Rate and Rhythm: Normal rate and regular rhythm. Heart sounds: Normal heart sounds. Pulmonary:      Effort: Pulmonary effort is normal.      Breath sounds: Normal breath sounds. No wheezing, rhonchi or rales. Abdominal:      General: Abdomen is flat. Palpations: Abdomen is soft. Tenderness: There is no abdominal tenderness. There is no guarding. Musculoskeletal:         General: Normal range of motion. Cervical back: Normal range of motion. Skin:     General: Skin is warm and dry. Neurological:      General: No focal deficit present. Mental Status: He is alert and oriented to person, place, and time. Cranial Nerves: No cranial nerve deficit. Sensory: No sensory deficit. Motor: No weakness. Psychiatric:         Mood and Affect: Mood normal.          MDM  Number of Diagnoses or Management Options  Contusion of rib on right side, initial encounter  Hypokalemia  Hyponatremia  Diagnosis management comments: 61-year-old male presents ER for evaluation of intermittent altered mental status after a fall 3 days ago. Labs show white count 18.7, stable H&H,  Sodium 115, potassium 2.7 BUN 37, creatinine 1.43, GFR is 52, T bili 1.2, ammonia 29, chest x-ray without any evidence of acute abnormality, head CT without evidence intracranial abnormality, cervical spine CT shows no acute fracture degenerative changes noted scalp hematoma also noted. Will give patient IV magnesium, IV potassium as well as oral potassium x2. Given his significant electrolyte abnormalities, he would benefit from medical admission.   Spoke with hospitalist who agreed to Ms. patient continued evaluation and treatment. Patient and family voiced understanding and agreement.        Amount and/or Complexity of Data Reviewed  Clinical lab tests: ordered and reviewed  Tests in the radiology section of CPT®: ordered and reviewed  Discussion of test results with the performing providers: yes  Discuss the patient with other providers: yes    Risk of Complications, Morbidity, and/or Mortality  Presenting problems: moderate  Diagnostic procedures: moderate  Management options: moderate           Procedures

## 2021-09-26 NOTE — ROUTINE PROCESS
Report called to [place] on 3rd floor. #22 jelco via the left hsnd intact without redness or swelling.   Pt transported to  353 via the stretcher

## 2021-09-26 NOTE — ED TRIAGE NOTES
Pt to ER with niece after falling on Thursday and hitting back of head and right ribs. Pt has not had a CT of head and is vomiting at night, and having AMS and some aphasia that is getting worse daily since the fall. Pt masked for triage.

## 2021-09-27 LAB
ANION GAP SERPL CALC-SCNC: 11 MMOL/L (ref 7–16)
ANION GAP SERPL CALC-SCNC: 12 MMOL/L (ref 7–16)
ANION GAP SERPL CALC-SCNC: 13 MMOL/L (ref 7–16)
BASOPHILS # BLD: 0 K/UL (ref 0–0.2)
BASOPHILS # BLD: 0 K/UL (ref 0–0.2)
BASOPHILS NFR BLD: 0 % (ref 0–2)
BASOPHILS NFR BLD: 0 % (ref 0–2)
BUN SERPL-MCNC: 39 MG/DL (ref 8–23)
BUN SERPL-MCNC: 43 MG/DL (ref 8–23)
BUN SERPL-MCNC: 43 MG/DL (ref 8–23)
CALCIUM SERPL-MCNC: 7.4 MG/DL (ref 8.3–10.4)
CALCIUM SERPL-MCNC: 7.5 MG/DL (ref 8.3–10.4)
CALCIUM SERPL-MCNC: 7.8 MG/DL (ref 8.3–10.4)
CHLORIDE SERPL-SCNC: 97 MMOL/L (ref 98–107)
CO2 SERPL-SCNC: 11 MMOL/L (ref 21–32)
CO2 SERPL-SCNC: 12 MMOL/L (ref 21–32)
CO2 SERPL-SCNC: 12 MMOL/L (ref 21–32)
CREAT SERPL-MCNC: 1.14 MG/DL (ref 0.8–1.5)
CREAT SERPL-MCNC: 1.28 MG/DL (ref 0.8–1.5)
CREAT SERPL-MCNC: 1.29 MG/DL (ref 0.8–1.5)
DIFFERENTIAL METHOD BLD: ABNORMAL
DIFFERENTIAL METHOD BLD: ABNORMAL
EOSINOPHIL # BLD: 0.1 K/UL (ref 0–0.8)
EOSINOPHIL # BLD: 0.2 K/UL (ref 0–0.8)
EOSINOPHIL NFR BLD: 0 % (ref 0.5–7.8)
EOSINOPHIL NFR BLD: 2 % (ref 0.5–7.8)
ERYTHROCYTE [DISTWIDTH] IN BLOOD BY AUTOMATED COUNT: 12.2 % (ref 11.9–14.6)
ERYTHROCYTE [DISTWIDTH] IN BLOOD BY AUTOMATED COUNT: 12.5 % (ref 11.9–14.6)
GLUCOSE BLD STRIP.AUTO-MCNC: 118 MG/DL (ref 65–100)
GLUCOSE BLD STRIP.AUTO-MCNC: 134 MG/DL (ref 65–100)
GLUCOSE BLD STRIP.AUTO-MCNC: 134 MG/DL (ref 65–100)
GLUCOSE BLD STRIP.AUTO-MCNC: 135 MG/DL (ref 65–100)
GLUCOSE BLD STRIP.AUTO-MCNC: 187 MG/DL (ref 65–100)
GLUCOSE SERPL-MCNC: 131 MG/DL (ref 65–100)
GLUCOSE SERPL-MCNC: 133 MG/DL (ref 65–100)
GLUCOSE SERPL-MCNC: 212 MG/DL (ref 65–100)
HCT VFR BLD AUTO: 33.9 % (ref 41.1–50.3)
HCT VFR BLD AUTO: 34.9 % (ref 41.1–50.3)
HGB BLD-MCNC: 11.7 G/DL (ref 13.6–17.2)
HGB BLD-MCNC: 12.3 G/DL (ref 13.6–17.2)
IMM GRANULOCYTES # BLD AUTO: 0.1 K/UL (ref 0–0.5)
IMM GRANULOCYTES # BLD AUTO: 0.1 K/UL (ref 0–0.5)
IMM GRANULOCYTES NFR BLD AUTO: 1 % (ref 0–5)
IMM GRANULOCYTES NFR BLD AUTO: 1 % (ref 0–5)
LYMPHOCYTES # BLD: 1.1 K/UL (ref 0.5–4.6)
LYMPHOCYTES # BLD: 1.5 K/UL (ref 0.5–4.6)
LYMPHOCYTES NFR BLD: 11 % (ref 13–44)
LYMPHOCYTES NFR BLD: 9 % (ref 13–44)
MCH RBC QN AUTO: 28.6 PG (ref 26.1–32.9)
MCH RBC QN AUTO: 29.2 PG (ref 26.1–32.9)
MCHC RBC AUTO-ENTMCNC: 34.5 G/DL (ref 31.4–35)
MCHC RBC AUTO-ENTMCNC: 35.2 G/DL (ref 31.4–35)
MCV RBC AUTO: 82.9 FL (ref 79.6–97.8)
MCV RBC AUTO: 82.9 FL (ref 79.6–97.8)
MONOCYTES # BLD: 1.9 K/UL (ref 0.1–1.3)
MONOCYTES # BLD: 2.3 K/UL (ref 0.1–1.3)
MONOCYTES NFR BLD: 14 % (ref 4–12)
MONOCYTES NFR BLD: 17 % (ref 4–12)
NEUTS SEG # BLD: 8.9 K/UL (ref 1.7–8.2)
NEUTS SEG # BLD: 9.8 K/UL (ref 1.7–8.2)
NEUTS SEG NFR BLD: 69 % (ref 43–78)
NEUTS SEG NFR BLD: 76 % (ref 43–78)
NRBC # BLD: 0 K/UL (ref 0–0.2)
NRBC # BLD: 0 K/UL (ref 0–0.2)
PLATELET # BLD AUTO: 260 K/UL (ref 150–450)
PLATELET # BLD AUTO: 271 K/UL (ref 150–450)
PMV BLD AUTO: 9.2 FL (ref 9.4–12.3)
PMV BLD AUTO: 9.7 FL (ref 9.4–12.3)
POTASSIUM SERPL-SCNC: 2.8 MMOL/L (ref 3.5–5.1)
POTASSIUM SERPL-SCNC: 3.1 MMOL/L (ref 3.5–5.1)
POTASSIUM SERPL-SCNC: 3.1 MMOL/L (ref 3.5–5.1)
RBC # BLD AUTO: 4.09 M/UL (ref 4.23–5.6)
RBC # BLD AUTO: 4.21 M/UL (ref 4.23–5.6)
SERVICE CMNT-IMP: ABNORMAL
SODIUM SERPL-SCNC: 120 MMOL/L (ref 136–145)
SODIUM SERPL-SCNC: 120 MMOL/L (ref 136–145)
SODIUM SERPL-SCNC: 122 MMOL/L (ref 136–145)
WBC # BLD AUTO: 12.9 K/UL (ref 4.3–11.1)
WBC # BLD AUTO: 13 K/UL (ref 4.3–11.1)

## 2021-09-27 PROCEDURE — 80048 BASIC METABOLIC PNL TOTAL CA: CPT

## 2021-09-27 PROCEDURE — 74011250637 HC RX REV CODE- 250/637: Performed by: INTERNAL MEDICINE

## 2021-09-27 PROCEDURE — 74011250636 HC RX REV CODE- 250/636: Performed by: FAMILY MEDICINE

## 2021-09-27 PROCEDURE — 65660000000 HC RM CCU STEPDOWN

## 2021-09-27 PROCEDURE — 74011000250 HC RX REV CODE- 250: Performed by: FAMILY MEDICINE

## 2021-09-27 PROCEDURE — 85025 COMPLETE CBC W/AUTO DIFF WBC: CPT

## 2021-09-27 PROCEDURE — 82962 GLUCOSE BLOOD TEST: CPT

## 2021-09-27 PROCEDURE — 36415 COLL VENOUS BLD VENIPUNCTURE: CPT

## 2021-09-27 PROCEDURE — 74011250637 HC RX REV CODE- 250/637: Performed by: FAMILY MEDICINE

## 2021-09-27 RX ORDER — SODIUM CHLORIDE 9 MG/ML
50 INJECTION, SOLUTION INTRAVENOUS CONTINUOUS
Status: DISCONTINUED | OUTPATIENT
Start: 2021-09-27 | End: 2021-09-29

## 2021-09-27 RX ORDER — HYDROCODONE BITARTRATE AND HOMATROPINE METHYLBROMIDE 1.5; 5 MG/5ML; MG/5ML
5 SYRUP ORAL
Status: DISCONTINUED | OUTPATIENT
Start: 2021-09-27 | End: 2021-10-01 | Stop reason: HOSPADM

## 2021-09-27 RX ORDER — SODIUM CHLORIDE, SODIUM LACTATE, POTASSIUM CHLORIDE, CALCIUM CHLORIDE 600; 310; 30; 20 MG/100ML; MG/100ML; MG/100ML; MG/100ML
75 INJECTION, SOLUTION INTRAVENOUS CONTINUOUS
Status: DISCONTINUED | OUTPATIENT
Start: 2021-09-27 | End: 2021-09-27

## 2021-09-27 RX ORDER — CALCIUM GLUCONATE 20 MG/ML
2 INJECTION, SOLUTION INTRAVENOUS
Status: COMPLETED | OUTPATIENT
Start: 2021-09-27 | End: 2021-09-27

## 2021-09-27 RX ORDER — POTASSIUM CHLORIDE 14.9 MG/ML
20 INJECTION INTRAVENOUS ONCE
Status: COMPLETED | OUTPATIENT
Start: 2021-09-27 | End: 2021-09-27

## 2021-09-27 RX ORDER — DESMOPRESSIN ACETATE 4 UG/ML
2 INJECTION, SOLUTION INTRAVENOUS; SUBCUTANEOUS 2 TIMES DAILY
Status: DISCONTINUED | OUTPATIENT
Start: 2021-09-27 | End: 2021-09-27

## 2021-09-27 RX ADMIN — ACETAMINOPHEN 650 MG: 325 TABLET, FILM COATED ORAL at 15:51

## 2021-09-27 RX ADMIN — Medication 10 ML: at 05:37

## 2021-09-27 RX ADMIN — Medication 10 ML: at 21:17

## 2021-09-27 RX ADMIN — Medication 10 ML: at 13:31

## 2021-09-27 RX ADMIN — SODIUM CHLORIDE 12.5 MG: 9 INJECTION INTRAMUSCULAR; INTRAVENOUS; SUBCUTANEOUS at 16:50

## 2021-09-27 RX ADMIN — POTASSIUM CHLORIDE 20 MEQ: 14.9 INJECTION, SOLUTION INTRAVENOUS at 16:15

## 2021-09-27 RX ADMIN — ACETAMINOPHEN 650 MG: 325 TABLET, FILM COATED ORAL at 08:17

## 2021-09-27 RX ADMIN — DESMOPRESSIN ACETATE 2 MCG: 4 SOLUTION INTRAVENOUS at 10:09

## 2021-09-27 RX ADMIN — HYDROCODONE BITARTRATE AND HOMATROPINE METHYLBROMIDE 5 ML: 5; 1.5 SOLUTION ORAL at 16:49

## 2021-09-27 RX ADMIN — CALCIUM GLUCONATE 2 G: 20 INJECTION, SOLUTION INTRAVENOUS at 18:11

## 2021-09-27 RX ADMIN — AMLODIPINE BESYLATE 10 MG: 10 TABLET ORAL at 08:18

## 2021-09-27 RX ADMIN — PRAVASTATIN SODIUM 40 MG: 20 TABLET ORAL at 08:18

## 2021-09-27 RX ADMIN — LOSARTAN POTASSIUM 100 MG: 50 TABLET, FILM COATED ORAL at 08:18

## 2021-09-27 RX ADMIN — CALCIUM GLUCONATE 2 G: 20 INJECTION, SOLUTION INTRAVENOUS at 19:35

## 2021-09-27 RX ADMIN — FLUTICASONE PROPIONATE 2 SPRAY: 50 SPRAY, METERED NASAL at 08:38

## 2021-09-27 RX ADMIN — SODIUM CHLORIDE 12.5 MG: 9 INJECTION INTRAMUSCULAR; INTRAVENOUS; SUBCUTANEOUS at 22:52

## 2021-09-27 RX ADMIN — SODIUM CHLORIDE 12.5 MG: 9 INJECTION INTRAMUSCULAR; INTRAVENOUS; SUBCUTANEOUS at 13:30

## 2021-09-27 NOTE — PROGRESS NOTES
Problem: Falls - Risk of  Goal: *Absence of Falls  Description: Document Lynch Reason Fall Risk and appropriate interventions in the flowsheet.   Outcome: Progressing Towards Goal  Note: Fall Risk Interventions:  Mobility Interventions: Communicate number of staff needed for ambulation/transfer, Patient to call before getting OOB    Mentation Interventions: Door open when patient unattended, Room close to nurse's station, Reorient patient    Medication Interventions: Assess postural VS orthostatic hypotension, Patient to call before getting OOB, Teach patient to arise slowly    Elimination Interventions: Call light in reach, Patient to call for help with toileting needs    History of Falls Interventions: Door open when patient unattended, Room close to nurse's station

## 2021-09-27 NOTE — PROGRESS NOTES
END OF SHIFT NOTE:    Intake/Output  09/26 1901 - 09/27 0700  In: 30 [P.O.:30]  Out: 1625 [Urine:1625]   Voiding: YES  Catheter: NO  Drain:              Stool:  0 occurrences. Emesis:  0 occurrences. VITAL SIGNS  Patient Vitals for the past 12 hrs:   Temp Pulse Resp BP SpO2   09/27/21 0332 97.8 °F (36.6 °C) 78 18 121/74 97 %   09/27/21 0000  82      09/26/21 2311 99.2 °F (37.3 °C) 76 19 126/73 98 %   09/26/21 2000  75      09/26/21 1919 98.3 °F (36.8 °C) 82 20 (!) 147/82 100 %       Pain Assessment  Pain 1  Pain Scale 1: Visual (09/27/21 0256)  Pain Intensity 1: 0 (09/27/21 0256)  Patient Stated Pain Goal: 0 (09/27/21 0256)  Pain Location 1: Chest (09/26/21 1719)  Pain Orientation 1: Right; Anterior (09/26/21 1719)  Pain Description 1: Arnold Held; Stabbing (09/26/21 1719)  Pain Intervention(s) 1: Emotional support; Family Support;Position; Rest ([place]) (09/26/21 1719)    Ambulating  No    Additional Information:   Pt has some pain, requested pain med and melatonin      Shift report given to oncoming nurse at the bedside.     Philipp Cespedes RN

## 2021-09-27 NOTE — PROGRESS NOTES
Problem: Falls - Risk of  Goal: *Absence of Falls  Description: Document Shira Led Fall Risk and appropriate interventions in the flowsheet. Outcome: Progressing Towards Goal  Note: Fall Risk Interventions:  Mobility Interventions: Assess mobility with egress test, Bed/chair exit alarm, Patient to call before getting OOB    Mentation Interventions: Adequate sleep, hydration, pain control, Bed/chair exit alarm, Increase mobility    Medication Interventions: Assess postural VS orthostatic hypotension, Bed/chair exit alarm, Patient to call before getting OOB    Elimination Interventions: Bed/chair exit alarm, Call light in reach, Patient to call for help with toileting needs    History of Falls Interventions: Bed/chair exit alarm, Consult care management for discharge planning, Investigate reason for fall         Problem: Patient Education: Go to Patient Education Activity  Goal: Patient/Family Education  Outcome: Progressing Towards Goal     Problem: Pressure Injury - Risk of  Goal: *Prevention of pressure injury  Description: Document Haile Scale and appropriate interventions in the flowsheet.   Outcome: Progressing Towards Goal  Note: Pressure Injury Interventions:  Sensory Interventions: Assess changes in LOC, Assess need for specialty bed, Float heels, Minimize linen layers         Activity Interventions: Assess need for specialty bed, Chair cushion, Pressure redistribution bed/mattress(bed type)    Mobility Interventions: Assess need for specialty bed, Chair cushion, Pressure redistribution bed/mattress (bed type)    Nutrition Interventions: Document food/fluid/supplement intake                     Problem: Patient Education: Go to Patient Education Activity  Goal: Patient/Family Education  Outcome: Progressing Towards Goal

## 2021-09-28 LAB
ANION GAP SERPL CALC-SCNC: 10 MMOL/L (ref 7–16)
ANION GAP SERPL CALC-SCNC: 11 MMOL/L (ref 7–16)
ANION GAP SERPL CALC-SCNC: 11 MMOL/L (ref 7–16)
BUN SERPL-MCNC: 30 MG/DL (ref 8–23)
BUN SERPL-MCNC: 32 MG/DL (ref 8–23)
BUN SERPL-MCNC: 35 MG/DL (ref 8–23)
CALCIUM SERPL-MCNC: 7.6 MG/DL (ref 8.3–10.4)
CALCIUM SERPL-MCNC: 7.7 MG/DL (ref 8.3–10.4)
CALCIUM SERPL-MCNC: 7.9 MG/DL (ref 8.3–10.4)
CHLORIDE SERPL-SCNC: 101 MMOL/L (ref 98–107)
CHLORIDE SERPL-SCNC: 101 MMOL/L (ref 98–107)
CHLORIDE SERPL-SCNC: 105 MMOL/L (ref 98–107)
CO2 SERPL-SCNC: 10 MMOL/L (ref 21–32)
CO2 SERPL-SCNC: 13 MMOL/L (ref 21–32)
CO2 SERPL-SCNC: 13 MMOL/L (ref 21–32)
CREAT SERPL-MCNC: 1.04 MG/DL (ref 0.8–1.5)
CREAT SERPL-MCNC: 1.11 MG/DL (ref 0.8–1.5)
CREAT SERPL-MCNC: 1.18 MG/DL (ref 0.8–1.5)
GLUCOSE BLD STRIP.AUTO-MCNC: 124 MG/DL (ref 65–100)
GLUCOSE BLD STRIP.AUTO-MCNC: 130 MG/DL (ref 65–100)
GLUCOSE BLD STRIP.AUTO-MCNC: 149 MG/DL (ref 65–100)
GLUCOSE BLD STRIP.AUTO-MCNC: 150 MG/DL (ref 65–100)
GLUCOSE SERPL-MCNC: 139 MG/DL (ref 65–100)
GLUCOSE SERPL-MCNC: 143 MG/DL (ref 65–100)
GLUCOSE SERPL-MCNC: 148 MG/DL (ref 65–100)
POTASSIUM SERPL-SCNC: 3 MMOL/L (ref 3.5–5.1)
POTASSIUM SERPL-SCNC: 3.5 MMOL/L (ref 3.5–5.1)
POTASSIUM SERPL-SCNC: 3.7 MMOL/L (ref 3.5–5.1)
SERVICE CMNT-IMP: ABNORMAL
SODIUM SERPL-SCNC: 122 MMOL/L (ref 136–145)
SODIUM SERPL-SCNC: 124 MMOL/L (ref 136–145)
SODIUM SERPL-SCNC: 129 MMOL/L (ref 136–145)

## 2021-09-28 PROCEDURE — 74011250636 HC RX REV CODE- 250/636: Performed by: INTERNAL MEDICINE

## 2021-09-28 PROCEDURE — 74011636637 HC RX REV CODE- 636/637: Performed by: INTERNAL MEDICINE

## 2021-09-28 PROCEDURE — 74011250637 HC RX REV CODE- 250/637: Performed by: INTERNAL MEDICINE

## 2021-09-28 PROCEDURE — 36416 COLLJ CAPILLARY BLOOD SPEC: CPT

## 2021-09-28 PROCEDURE — 82962 GLUCOSE BLOOD TEST: CPT

## 2021-09-28 PROCEDURE — 74011250636 HC RX REV CODE- 250/636: Performed by: FAMILY MEDICINE

## 2021-09-28 PROCEDURE — 74011250637 HC RX REV CODE- 250/637: Performed by: FAMILY MEDICINE

## 2021-09-28 PROCEDURE — 80048 BASIC METABOLIC PNL TOTAL CA: CPT

## 2021-09-28 PROCEDURE — 65660000000 HC RM CCU STEPDOWN

## 2021-09-28 RX ORDER — POTASSIUM CHLORIDE 20 MEQ/1
40 TABLET, EXTENDED RELEASE ORAL 2 TIMES DAILY
Status: COMPLETED | OUTPATIENT
Start: 2021-09-28 | End: 2021-09-28

## 2021-09-28 RX ORDER — SODIUM CHLORIDE TAB 1 GM 1 G
1 TAB MISCELLANEOUS 2 TIMES DAILY WITH MEALS
Status: DISCONTINUED | OUTPATIENT
Start: 2021-09-28 | End: 2021-10-01 | Stop reason: HOSPADM

## 2021-09-28 RX ADMIN — LOSARTAN POTASSIUM 100 MG: 50 TABLET, FILM COATED ORAL at 09:17

## 2021-09-28 RX ADMIN — PRAVASTATIN SODIUM 40 MG: 20 TABLET ORAL at 09:16

## 2021-09-28 RX ADMIN — POTASSIUM CHLORIDE 40 MEQ: 20 TABLET, EXTENDED RELEASE ORAL at 17:24

## 2021-09-28 RX ADMIN — HYDROCODONE BITARTRATE AND HOMATROPINE METHYLBROMIDE 5 ML: 5; 1.5 SOLUTION ORAL at 12:34

## 2021-09-28 RX ADMIN — Medication 10 ML: at 05:12

## 2021-09-28 RX ADMIN — HYDROCODONE BITARTRATE AND HOMATROPINE METHYLBROMIDE 5 ML: 5; 1.5 SOLUTION ORAL at 00:29

## 2021-09-28 RX ADMIN — SODIUM CHLORIDE 12.5 MG: 9 INJECTION INTRAMUSCULAR; INTRAVENOUS; SUBCUTANEOUS at 05:11

## 2021-09-28 RX ADMIN — Medication 10 ML: at 14:36

## 2021-09-28 RX ADMIN — Medication 1 G: at 17:24

## 2021-09-28 RX ADMIN — FLUTICASONE PROPIONATE 2 SPRAY: 50 SPRAY, METERED NASAL at 09:16

## 2021-09-28 RX ADMIN — Medication 10 ML: at 21:47

## 2021-09-28 RX ADMIN — ACETAMINOPHEN 650 MG: 325 TABLET, FILM COATED ORAL at 23:59

## 2021-09-28 RX ADMIN — INSULIN LISPRO 2 UNITS: 100 INJECTION, SOLUTION INTRAVENOUS; SUBCUTANEOUS at 12:25

## 2021-09-28 RX ADMIN — AMLODIPINE BESYLATE 10 MG: 10 TABLET ORAL at 09:17

## 2021-09-28 RX ADMIN — SODIUM CHLORIDE 50 ML/HR: 900 INJECTION, SOLUTION INTRAVENOUS at 12:47

## 2021-09-28 RX ADMIN — ACETAMINOPHEN 650 MG: 325 TABLET, FILM COATED ORAL at 12:44

## 2021-09-28 RX ADMIN — SODIUM CHLORIDE 75 ML/HR: 900 INJECTION, SOLUTION INTRAVENOUS at 02:36

## 2021-09-28 RX ADMIN — POTASSIUM CHLORIDE 40 MEQ: 20 TABLET, EXTENDED RELEASE ORAL at 09:16

## 2021-09-28 NOTE — PROGRESS NOTES
SW met with pt's niece in the hallway. Niece lives locally and just finished PA school. SW discussed previous living arrangements and any anticipated dc needs. Pt lives at home with his spouse and daughter, Earline James [phone]. Pt worked PTA. Pt was independent, no DME, drives ,etc.   Spouse is also independent. Pt has a son who lives in [place]. Niece states pt us currently unsteady , PT not ordered at this time. OPAL will discuss with hospitalist when PT eval is appropriate, niece aware. Niece reports pt's dtr, Earline James, requested for MD to speak with her. Dtr not presently at the hospital, MD requested hospitalist to call dtr. OPAL to follow for dc needs.    ROXANN MccordW

## 2021-09-28 NOTE — PROGRESS NOTES
Hospitalist Progress Note   Admit Date:  2021 12:49 PM   Name:  Elizabeth Limon   Age:  80 y.o. Sex:  male  :  1950   MRN:  [id number]   Room:  [facility]/    Presenting Complaint: Fall    Reason(s) for Admission: Hyponatremia [E87.1]  Hypokalemia [E87.6]     Hospital Course & Interval History:     \" Patient is a 80 y.o. male with medical h/o   Hypertension  Dyslipidemia  Diabetes mellitus type 2     Patient has been in his usual state of health, until 4 days prior to admission when he was walking upstairs and slipped and fell. He hit his head. There is no report of loss of consciousness. There was no witness at the time.      Patient was brought to an urgent care at that time. Evaluation showed that he had rib contusion according to family member's account.       Since the fall he has been weak. He has not been eating well. Niece reports that patient is confused off and on. There is no report of abnormal motor movement or seizure. No projectile vomiting. Patient reports no headache. No shortness of breath. No chest pain.      Patient was brought to emergency room today. He was found to have sodium of 115, with low potassium. Also with elevated white blood cells. There is no history of fever, no chills. \"      Patient has been under treatment for hyponatremia which is improving. Sodium has improved from 115 to124 today. He is still confused sometimes. He has mittens in place. Subjective (21):    He has been in bed, has mittens for both hands. No fever, no chills. No reported shortness of breath. Still with some right chest discomfort with coughing. This is after a fall. Assessment & Plan:     Hyponatremia  Acute metabolic encephalopathy on admission  Likely secondary to chronic use of hydrochlorothiazide, with the setting of poor oral intake. He was on acetazolamide as well.    Likely be responsible for his confusion, and acute metabolic encephalopathy on admission  We will continue with IV normal saline. Patient receive DDAVP to prevent overcorrection in a short time. Patient had high urine osmolarity with inappropriate high sodium in the urine although his serum sodium was low. This is consistent with SIADH. Patient has been receiving IV fluid for the past 48 hours. We will cut back on IV fluid rate. We will give more sodium supplementation. Monitor symptoms. Monitor neurological assessment.      Hypokalemia  Hypochloremia  Likely associated with volume depletion. In the setting of chronic use of hydrochlorothiazide. Continue with potassium supplementation. Check BMP this evening and tomorrow.      Hypertension  Resume home medications. Monitor blood pressure  BP is 150/78 mmHg now. Patient is on amlodipine, losartan.      Diabetes mellitus type 2  Give diabetic diet  Check fingerstick blood sugar before each meal and at bedtime  Cover with insulin accordingly  Blood sugar is in acceptable range. Dispo/Discharge Planning: To be determined    Diet:  ADULT DIET Regular; 4 carb choices (60 gm/meal)  DVT PPx: SCD   Code status: Full Code     I have discussed the plan of care with patient and niece at bedside. I called Mian britt Sessions and updated her about patient's condition.      Hospital Problems as of 9/28/2021 Date Reviewed: 8/13/2021        Codes Class Noted - Resolved POA    * (Principal) Acute hyponatremia ICD-10-CM: E87.1  ICD-9-CM: 276.1  9/26/2021 - Present Yes        Acute metabolic encephalopathy BWO-89-N[place]: G93.41  ICD-9-CM: 348.31  9/26/2021 - Present Yes        Hypokalemia ICD-10-CM: E87.6  ICD-9-CM: 276.8  9/26/2021 - Present Yes        Leukocytosis ICD-10-CM: D72.829  ICD-9-CM: 288.60  9/26/2021 - Present Yes        Volume depletion ICD-10-CM: E86.9  ICD-9-CM: 276.50  9/26/2021 - Present Yes        Rib contusion ICD-10-CM: S20.219A  ICD-9-CM: 922.1  9/26/2021 - Present Yes        History of recent fall ICD-10-CM: Z91.81  ICD-9-CM: V15.88  9/26/2021 - Present Yes        Hyponatremia ICD-10-CM: E87.1  ICD-9-CM: 276.1  9/26/2021 - Present Yes        Type 2 diabetes with nephropathy ([facility] ICD-10-CM: E11.21  ICD-9-CM: 250.40, 583.81  4/4/2018 - Present Yes        Pure hypercholesterolemia ICD-10-CM: E78.00  ICD-9-CM: 272.0  5/24/2016 - Present Yes        Essential hypertension, benign ICD-10-CM: I10  ICD-9-CM: 401.1  Unknown - Present Yes              Objective:     Patient Vitals for the past 24 hrs:   Temp Pulse Resp BP SpO2   09/28/21 0722 98.2 °F (36.8 °C) 93 18 (!) 150/78 100 %   09/28/21 0351 97.2 °F (36.2 °C) 84 17 (!) 148/79 97 %   09/28/21 0031 98.1 °F (36.7 °C) 92 19 132/87 97 %   09/27/21 2010 98.6 °F (37 °C) 77 19  99 %   09/27/21 1602    122/69    09/27/21 1441 98.3 °F (36.8 °C) 83 20 132/71 99 %   09/27/21 1112 98.1 °F (36.7 °C) 75 20 116/69 98 %     Oxygen Therapy  O2 Sat (%): 100 % (09/28/21 0722)  Pulse via Oximetry: 88 beats per minute (09/26/21 1729)  O2 Device: None (Room air) (09/28/21 0351)    Estimated body mass index is 26.47 kg/m² as calculated from the following:    Height as of this encounter: 5' 6\" (1.676 m). Weight as of this encounter: 74.4 kg (164 lb). Intake/Output Summary (Last 24 hours) at 9/28/2021 1037  Last data filed at 9/28/2021 7128  Gross per 24 hour   Intake 3552 ml   Output 2025 ml   Net 1527 ml         Physical Exam:   Visit Vitals  BP (!) 150/78 (BP 1 Location: Right upper arm, BP Patient Position: Supine)   Pulse 93   Temp 98.2 °F (36.8 °C)   Resp 18   Ht 5' 6\" (1.676 m)   Wt 74.4 kg (164 lb)   SpO2 100%   BMI 26.47 kg/m²      General: No acute distress, speaking in full sentences, no use of accessory muscles. Patient speaks [place]. He has limited command of [place]. Sometimes appears confused. Otherwise lying flat in bed.    HEENT: Pupils equal and reactive to light and accommodation, oropharynx is clear   Neck: Supple, no lymphadenopathy, no JVD   Lungs: Clear to auscultation bilaterally   Cardiovascular: Regular rate and rhythm with normal S1 and S2   Abdomen: Soft, nontender, mildly distended, normoactive bowel sounds   Extremities: No cyanosis clubbing or edema. Both hands are covered with mittens. Neuro: Nonfocal, alert, appears confused.    Psych: Normal mood and affect     I have reviewed ordered lab tests and independently visualized imaging below:    Last 24hr Labs:  Recent Results (from the past 24 hour(s))   METABOLIC PANEL, BASIC    Collection Time: 09/27/21 12:02 PM   Result Value Ref Range    Sodium 120 (L) 136 - 145 mmol/L    Potassium 2.8 (L) 3.5 - 5.1 mmol/L    Chloride 97 (L) 98 - 107 mmol/L    CO2 11 (L) 21 - 32 mmol/L    Anion gap 12 7 - 16 mmol/L    Glucose 212 (H) 65 - 100 mg/dL    BUN 43 (H) 8 - 23 MG/DL    Creatinine 1.28 0.8 - 1.5 MG/DL    GFR est AA >60 >60 ml/min/1.73m2    GFR est non-AA 59 (L) >60 ml/min/1.73m2    Calcium 7.4 (L) 8.3 - 10.4 MG/DL   GLUCOSE, POC    Collection Time: 09/27/21 12:50 PM   Result Value Ref Range    Glucose (POC) 187 (H) 65 - 100 mg/dL    Performed by Addy    GLUCOSE, POC    Collection Time: 09/27/21  3:59 PM   Result Value Ref Range    Glucose (POC) 134 (H) 65 - 100 mg/dL    Performed by Gabriela    GLUCOSE, POC    Collection Time: 09/27/21  5:13 PM   Result Value Ref Range    Glucose (POC) 134 (H) 65 - 100 mg/dL    Performed by Lazaro    METABOLIC PANEL, BASIC    Collection Time: 09/27/21  5:26 PM   Result Value Ref Range    Sodium 120 (L) 136 - 145 mmol/L    Potassium 3.1 (L) 3.5 - 5.1 mmol/L    Chloride 97 (L) 98 - 107 mmol/L    CO2 12 (L) 21 - 32 mmol/L    Anion gap 11 7 - 16 mmol/L    Glucose 133 (H) 65 - 100 mg/dL    BUN 39 (H) 8 - 23 MG/DL    Creatinine 1.14 0.8 - 1.5 MG/DL    GFR est AA >60 >60 ml/min/1.73m2    GFR est non-AA >60 >60 ml/min/1.73m2    Calcium 7.5 (L) 8.3 - 10.4 MG/DL   CBC WITH AUTOMATED DIFF    Collection Time: 09/27/21  5:26 PM   Result Value Ref Range    WBC 13.0 (H) 4.3 - 11.1 K/uL    RBC 4.21 (L) 4.23 - 5.6 M/uL    HGB 12.3 (L) 13.6 - 17.2 g/dL    HCT 34.9 (L) 41.1 - 50.3 %    MCV 82.9 79.6 - 97.8 FL    MCH 29.2 26.1 - 32.9 PG    MCHC 35.2 (H) 31.4 - 35.0 g/dL    RDW 12.5 11.9 - 14.6 %    PLATELET 815 932 - 420 K/uL    MPV 9.7 9.4 - 12.3 FL    ABSOLUTE NRBC 0.00 0.0 - 0.2 K/uL    DF AUTOMATED      NEUTROPHILS 69 43 - 78 %    LYMPHOCYTES 11 (L) 13 - 44 %    MONOCYTES 17 (H) 4.0 - 12.0 %    EOSINOPHILS 2 0.5 - 7.8 %    BASOPHILS 0 0.0 - 2.0 %    IMMATURE GRANULOCYTES 1 0.0 - 5.0 %    ABS. NEUTROPHILS 8.9 (H) 1.7 - 8.2 K/UL    ABS. LYMPHOCYTES 1.5 0.5 - 4.6 K/UL    ABS. MONOCYTES 2.3 (H) 0.1 - 1.3 K/UL    ABS. EOSINOPHILS 0.2 0.0 - 0.8 K/UL    ABS. BASOPHILS 0.0 0.0 - 0.2 K/UL    ABS. IMM.  GRANS. 0.1 0.0 - 0.5 K/UL   GLUCOSE, POC    Collection Time: 09/27/21  8:58 PM   Result Value Ref Range    Glucose (POC) 135 (H) 65 - 100 mg/dL    Performed by [facility]    METABOLIC PANEL, BASIC    Collection Time: 09/28/21 12:20 AM   Result Value Ref Range    Sodium 122 (L) 136 - 145 mmol/L    Potassium 3.5 3.5 - 5.1 mmol/L    Chloride 101 98 - 107 mmol/L    CO2 10 (L) 21 - 32 mmol/L    Anion gap 11 7 - 16 mmol/L    Glucose 148 (H) 65 - 100 mg/dL    BUN 35 (H) 8 - 23 MG/DL    Creatinine 1.04 0.8 - 1.5 MG/DL    GFR est AA >60 >60 ml/min/1.73m2    GFR est non-AA >60 >60 ml/min/1.73m2    Calcium 7.9 (L) 8.3 - 45.4 MG/DL   METABOLIC PANEL, BASIC    Collection Time: 09/28/21  6:25 AM   Result Value Ref Range    Sodium 124 (L) 136 - 145 mmol/L    Potassium 3.0 (L) 3.5 - 5.1 mmol/L    Chloride 101 98 - 107 mmol/L    CO2 13 (L) 21 - 32 mmol/L    Anion gap 10 7 - 16 mmol/L    Glucose 139 (H) 65 - 100 mg/dL    BUN 32 (H) 8 - 23 MG/DL    Creatinine 1.11 0.8 - 1.5 MG/DL    GFR est AA >60 >60 ml/min/1.73m2    GFR est non-AA >60 >60 ml/min/1.73m2    Calcium 7.6 (L) 8.3 - 10.4 MG/DL   GLUCOSE, POC    Collection Time: 09/28/21  8:24 AM   Result Value Ref Range    Glucose (POC) 149 (H) 65 - 100 mg/dL    Performed by [employer]        All Micro Results     None          Other Studies:  No results found. Current Meds:  Current Facility-Administered Medications   Medication Dose Route Frequency    potassium chloride (K-DUR, KLOR-CON) SR tablet 40 mEq  40 mEq Oral BID    0.9% sodium chloride infusion  75 mL/hr IntraVENous CONTINUOUS    promethazine (PHENERGAN) with saline injection 12.5 mg  12.5 mg IntraVENous Q4H PRN    HYDROcodone-homatropine (HYCODAN) 5-1.5 mg/5 mL (5 mL) oral solution 5 mL  5 mL Oral Q4H PRN    sodium chloride (NS) flush 5-40 mL  5-40 mL IntraVENous Q8H    sodium chloride (NS) flush 5-40 mL  5-40 mL IntraVENous PRN    acetaminophen (TYLENOL) tablet 650 mg  650 mg Oral Q6H PRN    Or    acetaminophen (TYLENOL) suppository 650 mg  650 mg Rectal Q6H PRN    polyethylene glycol (MIRALAX) packet 17 g  17 g Oral DAILY PRN    ondansetron (ZOFRAN ODT) tablet 4 mg  4 mg Oral Q8H PRN    Or    ondansetron (ZOFRAN) injection 4 mg  4 mg IntraVENous Q6H PRN    insulin lispro (HUMALOG) injection   SubCUTAneous AC&HS    amLODIPine (NORVASC) tablet 10 mg  10 mg Oral DAILY    fluticasone propionate (FLONASE) 50 mcg/actuation nasal spray 2 Spray  2 Spray Both Nostrils DAILY    losartan (COZAAR) tablet 100 mg  100 mg Oral DAILY    pravastatin (PRAVACHOL) tablet 40 mg  40 mg Oral DAILY       Signed:  Ivanna Gatica MD    Part of this note may have been written by using a voice dictation software. The note has been proof read but may still contain some grammatical/other typographical errors.

## 2021-09-28 NOTE — PROGRESS NOTES
END OF SHIFT NOTE:    Intake/Output  09/27 1901 - 09/28 0700  In: 3432 [P.O.:240; I.V.:3192]  Out: 1125 [Urine:1125]   Voiding: YES  Catheter: NO  Drain:              Stool:  0 occurrences. Emesis:  0 occurrences. VITAL SIGNS  Patient Vitals for the past 12 hrs:   Temp Pulse Resp BP SpO2   09/28/21 0351 97.2 °F (36.2 °C) 84 17 (!) 148/79 97 %   09/28/21 0031 98.1 °F (36.7 °C) 92 19 132/87 97 %   09/27/21 2010 98.6 °F (37 °C) 77 19  99 %       Pain Assessment  Pain 1  Pain Scale 1: Visual (09/28/21 0155)  Pain Intensity 1: 0 (09/28/21 0155)  Patient Stated Pain Goal: 0 (09/28/21 0155)  Pain Location 1: Chest (09/26/21 1719)  Pain Orientation 1: Right; Anterior (09/26/21 1719)  Pain Description 1: Kary Bonine; Stabbing (09/26/21 1719)  Pain Intervention(s) 1: Emotional support; Family Support;Position; Rest ([place]) (09/26/21 1719)    Ambulating  No    Additional Information:   Remains confused;restless on & off.  Calcium replacements completed;IVF continues. Shift report given to oncoming nurse at the bedside.     Scott Abdullahi RN

## 2021-09-28 NOTE — PROGRESS NOTES
Hourly rounds completed throughout this shift. Pt has been drowsy most of the shift. Pt resting in bed; denies needs at this time. Will continue to monitor and report to oncoming night shift nurse.

## 2021-09-29 ENCOUNTER — APPOINTMENT (OUTPATIENT)
Dept: ULTRASOUND IMAGING | Age: 71
DRG: 643 | End: 2021-09-29
Attending: INTERNAL MEDICINE
Payer: MEDICARE

## 2021-09-29 LAB
ALBUMIN SERPL-MCNC: 3.1 G/DL (ref 3.2–4.6)
ALBUMIN/GLOB SERPL: 0.7 {RATIO} (ref 1.2–3.5)
ALP SERPL-CCNC: 49 U/L (ref 50–136)
ALT SERPL-CCNC: 13 U/L (ref 12–65)
ANION GAP SERPL CALC-SCNC: 10 MMOL/L (ref 7–16)
ANION GAP SERPL CALC-SCNC: 9 MMOL/L (ref 7–16)
AST SERPL-CCNC: 14 U/L (ref 15–37)
BASOPHILS # BLD: 0.1 K/UL (ref 0–0.2)
BASOPHILS NFR BLD: 1 % (ref 0–2)
BILIRUB SERPL-MCNC: 0.4 MG/DL (ref 0.2–1.1)
BUN SERPL-MCNC: 29 MG/DL (ref 8–23)
BUN SERPL-MCNC: 30 MG/DL (ref 8–23)
CALCIUM SERPL-MCNC: 7.7 MG/DL (ref 8.3–10.4)
CALCIUM SERPL-MCNC: 7.8 MG/DL (ref 8.3–10.4)
CHLORIDE SERPL-SCNC: 109 MMOL/L (ref 98–107)
CHLORIDE SERPL-SCNC: 109 MMOL/L (ref 98–107)
CO2 SERPL-SCNC: 14 MMOL/L (ref 21–32)
CO2 SERPL-SCNC: 15 MMOL/L (ref 21–32)
CORTIS AM PEAK SERPL-MCNC: 17.6 UG/DL (ref 7–25)
CREAT SERPL-MCNC: 1.06 MG/DL (ref 0.8–1.5)
CREAT SERPL-MCNC: 1.54 MG/DL (ref 0.8–1.5)
DIFFERENTIAL METHOD BLD: ABNORMAL
EOSINOPHIL # BLD: 0.4 K/UL (ref 0–0.8)
EOSINOPHIL NFR BLD: 3 % (ref 0.5–7.8)
ERYTHROCYTE [DISTWIDTH] IN BLOOD BY AUTOMATED COUNT: 13.2 % (ref 11.9–14.6)
GLOBULIN SER CALC-MCNC: 4.2 G/DL (ref 2.3–3.5)
GLUCOSE BLD STRIP.AUTO-MCNC: 130 MG/DL (ref 65–100)
GLUCOSE BLD STRIP.AUTO-MCNC: 140 MG/DL (ref 65–100)
GLUCOSE BLD STRIP.AUTO-MCNC: 201 MG/DL (ref 65–100)
GLUCOSE BLD STRIP.AUTO-MCNC: 222 MG/DL (ref 65–100)
GLUCOSE SERPL-MCNC: 145 MG/DL (ref 65–100)
GLUCOSE SERPL-MCNC: 209 MG/DL (ref 65–100)
HCT VFR BLD AUTO: 36.6 % (ref 41.1–50.3)
HGB BLD-MCNC: 12.7 G/DL (ref 13.6–17.2)
IMM GRANULOCYTES # BLD AUTO: 0.1 K/UL (ref 0–0.5)
IMM GRANULOCYTES NFR BLD AUTO: 0 % (ref 0–5)
LYMPHOCYTES # BLD: 1.1 K/UL (ref 0.5–4.6)
LYMPHOCYTES NFR BLD: 9 % (ref 13–44)
MCH RBC QN AUTO: 29.1 PG (ref 26.1–32.9)
MCHC RBC AUTO-ENTMCNC: 34.7 G/DL (ref 31.4–35)
MCV RBC AUTO: 83.9 FL (ref 79.6–97.8)
MONOCYTES # BLD: 1.3 K/UL (ref 0.1–1.3)
MONOCYTES NFR BLD: 11 % (ref 4–12)
NEUTS SEG # BLD: 9.5 K/UL (ref 1.7–8.2)
NEUTS SEG NFR BLD: 77 % (ref 43–78)
NRBC # BLD: 0 K/UL (ref 0–0.2)
PLATELET # BLD AUTO: 316 K/UL (ref 150–450)
PMV BLD AUTO: 9.2 FL (ref 9.4–12.3)
POTASSIUM SERPL-SCNC: 3.7 MMOL/L (ref 3.5–5.1)
POTASSIUM SERPL-SCNC: 4.1 MMOL/L (ref 3.5–5.1)
PROT SERPL-MCNC: 7.3 G/DL (ref 6.3–8.2)
RBC # BLD AUTO: 4.36 M/UL (ref 4.23–5.6)
SERVICE CMNT-IMP: ABNORMAL
SODIUM SERPL-SCNC: 133 MMOL/L (ref 136–145)
SODIUM SERPL-SCNC: 133 MMOL/L (ref 136–145)
SODIUM SERPL-SCNC: 134 MMOL/L (ref 136–145)
SODIUM SERPL-SCNC: 134 MMOL/L (ref 136–145)
T4 FREE SERPL-MCNC: 1.2 NG/DL (ref 0.78–1.46)
TSH SERPL DL<=0.005 MIU/L-ACNC: 1.73 UIU/ML
WBC # BLD AUTO: 12.4 K/UL (ref 4.3–11.1)

## 2021-09-29 PROCEDURE — 84295 ASSAY OF SERUM SODIUM: CPT

## 2021-09-29 PROCEDURE — 65660000000 HC RM CCU STEPDOWN

## 2021-09-29 PROCEDURE — 97161 PT EVAL LOW COMPLEX 20 MIN: CPT

## 2021-09-29 PROCEDURE — 84439 ASSAY OF FREE THYROXINE: CPT

## 2021-09-29 PROCEDURE — 82533 TOTAL CORTISOL: CPT

## 2021-09-29 PROCEDURE — 85025 COMPLETE CBC W/AUTO DIFF WBC: CPT

## 2021-09-29 PROCEDURE — 74011636637 HC RX REV CODE- 636/637: Performed by: INTERNAL MEDICINE

## 2021-09-29 PROCEDURE — 97530 THERAPEUTIC ACTIVITIES: CPT

## 2021-09-29 PROCEDURE — 80053 COMPREHEN METABOLIC PANEL: CPT

## 2021-09-29 PROCEDURE — 36415 COLL VENOUS BLD VENIPUNCTURE: CPT

## 2021-09-29 PROCEDURE — 76775 US EXAM ABDO BACK WALL LIM: CPT

## 2021-09-29 PROCEDURE — 74011250637 HC RX REV CODE- 250/637: Performed by: INTERNAL MEDICINE

## 2021-09-29 PROCEDURE — 84443 ASSAY THYROID STIM HORMONE: CPT

## 2021-09-29 PROCEDURE — 82962 GLUCOSE BLOOD TEST: CPT

## 2021-09-29 RX ORDER — LABETALOL HYDROCHLORIDE 5 MG/ML
10 INJECTION, SOLUTION INTRAVENOUS
Status: DISCONTINUED | OUTPATIENT
Start: 2021-09-29 | End: 2021-10-01 | Stop reason: HOSPADM

## 2021-09-29 RX ADMIN — FLUTICASONE PROPIONATE 2 SPRAY: 50 SPRAY, METERED NASAL at 09:16

## 2021-09-29 RX ADMIN — ACETAMINOPHEN 650 MG: 325 TABLET, FILM COATED ORAL at 19:42

## 2021-09-29 RX ADMIN — AMLODIPINE BESYLATE 10 MG: 10 TABLET ORAL at 09:16

## 2021-09-29 RX ADMIN — Medication 10 ML: at 05:29

## 2021-09-29 RX ADMIN — Medication 1 G: at 17:19

## 2021-09-29 RX ADMIN — INSULIN LISPRO 4 UNITS: 100 INJECTION, SOLUTION INTRAVENOUS; SUBCUTANEOUS at 21:54

## 2021-09-29 RX ADMIN — INSULIN LISPRO 4 UNITS: 100 INJECTION, SOLUTION INTRAVENOUS; SUBCUTANEOUS at 11:38

## 2021-09-29 RX ADMIN — Medication 10 ML: at 21:54

## 2021-09-29 RX ADMIN — Medication 1 G: at 09:16

## 2021-09-29 RX ADMIN — LOSARTAN POTASSIUM 100 MG: 50 TABLET, FILM COATED ORAL at 09:16

## 2021-09-29 RX ADMIN — Medication 10 ML: at 14:09

## 2021-09-29 RX ADMIN — ACETAMINOPHEN 650 MG: 325 TABLET, FILM COATED ORAL at 09:22

## 2021-09-29 RX ADMIN — PRAVASTATIN SODIUM 40 MG: 20 TABLET ORAL at 09:15

## 2021-09-29 NOTE — PROGRESS NOTES
Hospitalist Progress Note   Admit Date:  2021 12:49 PM   Name:  Prisca Bey   Age:  80 y.o. Sex:  male  :  1950   MRN:  [id number]   Room:  [facility]    Presenting Complaint: Fall    Reason(s) for Admission: Hyponatremia [E87.1]  Hypokalemia [E87.6]     Hospital Course & Interval History:       Patient is a 80 y.o. male with medical h/o   Hypertension  Dyslipidemia  Diabetes mellitus type 2     Patient has been in his usual state of health, until 4 days prior to admission when he was walking upstairs and slipped and fell. He hit his head. There is no report of loss of consciousness. There was no witness at the time.      Patient was brought to an urgent care at that time. Evaluation showed that he had rib contusion according to family member's account.       Since the fall he has been weak. He has not been eating well. Niece reports that patient is confused off and on. There is no report of abnormal motor movement or seizure. No projectile vomiting. Patient reports no headache. No shortness of breath. No chest pain.      Patient was brought to emergency room today. He was found to have sodium of 115, with low potassium. Also with elevated white blood cells. There is no history of fever, no chills. \"      Patient has been under treatment for hyponatremia which is improving. Sodium has improved from 115 to124 today. He is still confused sometimes. He has mittens in place. Subjective 2021:    Patient's daughter was at bedside. I have used [employer] interpretation services during interview to facilitate the interview. Patient wants to know what medication he is getting. As per patient dizziness better but intermittently also having right sided chest pain after a fall. Denies any fever, chills, shortness of breath, palpitation, nausea or vomiting. Rest review of system negative except mentioned above.       Assessment & Plan:     Hyponatremia  Acute metabolic encephalopathy on admission  Likely secondary to chronic use of hydrochlorothiazide, with the setting of poor oral intake. He was on acetazolamide as well. Likely be responsible for his confusion, and acute metabolic encephalopathy on admission  We will continue with IV normal saline. Patient receive DDAVP to prevent overcorrection in a short time. Patient had high urine osmolarity with inappropriate high sodium in the urine although his serum sodium was low. This is consistent with SIADH. Patient has been receiving IV fluid for the past 48 hours. We will cut back on IV fluid rate. We will give more sodium supplementation. Monitor symptoms. Monitor neurological assessment.      September 29: Seems like patient was dehydrated on presentation per notes. Acetazolamide has been stopped and as per patient daughter, ophthalmologist recommended to stop acetazolamide and see them in clinic. Hydrochlorothiazide on hold. Sodium improving with 1 24-1 33 in 28 hours. Will monitor sodium every 8. Stop IV fluids. TSH within normal limits. Cortisol level pending. Hypokalemia  Hypochloremia  Likely associated with volume depletion. In the setting of chronic use of hydrochlorothiazide. Continue with potassium supplementation. Check BMP this evening and tomorrow.      September 29: Resolved. Acute kidney injury: Unclear etiology. Renal ultrasound ordered. Hold losartan. Will monitor kidney function. Right-sided chest pain: X-ray negative. Not hypoxic. Denies any other complaint. Incentive spirometer ordered. Will monitor. Hypertension  Resume home medications. Monitor blood pressure  BP is 150/78 mmHg now. Patient is on amlodipine, losartan.      September 29: Holding losartan given MASTER. And labetalol ordered.     Diabetes mellitus type 2  Give diabetic diet  Check fingerstick blood sugar before each meal and at bedtime  Cover with insulin accordingly  Blood sugar is in acceptable range. Dispo/Discharge Planning: To be determined    Diet:  ADULT DIET Regular; 4 carb choices (60 gm/meal)  DVT PPx: SCD   Code status: Full Code     I have discussed the plan of care with patient and niece at bedside. I called daughterEarline and updated her about patient's condition.      Hospital Problems as of 9/29/2021 Date Reviewed: 8/13/2021        Codes Class Noted - Resolved POA    * (Principal) Acute hyponatremia ICD-10-CM: E87.1  ICD-9-CM: 276.1  9/26/2021 - Present Yes        Acute metabolic encephalopathy [id number]: G93.41  ICD-9-CM: 348.31  9/26/2021 - Present Yes        Hypokalemia ICD-10-CM: E87.6  ICD-9-CM: 276.8  9/26/2021 - Present Yes        Leukocytosis ICD-10-CM: D72.829  ICD-9-CM: 288.60  9/26/2021 - Present Yes        Volume depletion ICD-10-CM: E86.9  ICD-9-CM: 276.50  9/26/2021 - Present Yes        Rib contusion ICD-10-CM: X09.348R  ICD-9-CM: 922.1  9/26/2021 - Present Yes        History of recent fall ICD-10-CM: Z91.81  ICD-9-CM: V15.88  9/26/2021 - Present Yes        Hyponatremia ICD-10-CM: E87.1  ICD-9-CM: 276.1  9/26/2021 - Present Yes        Type 2 diabetes with nephropathy (HCC) ICD-10-CM: E11.21  ICD-9-CM: 250.40, 583.81  4/4/2018 - Present Yes        Pure hypercholesterolemia ICD-10-CM: E78.00  ICD-9-CM: 272.0  5/24/2016 - Present Yes        Essential hypertension, benign ICD-10-CM: I10  ICD-9-CM: 401.1  Unknown - Present Yes              Objective:     Patient Vitals for the past 24 hrs:   Temp Pulse Resp BP SpO2   09/29/21 1145 98.4 °F (36.9 °C) 76 18 (!) 100/59 97 %   09/29/21 0757 97.8 °F (36.6 °C) 77 18 127/71 99 %   09/29/21 0331 97.9 °F (36.6 °C) 74 18 114/69 99 %   09/28/21 2347 97.9 °F (36.6 °C) 79 15 (!) 145/84 97 %   09/28/21 2030 98.7 °F (37.1 °C) 84 16 138/71 98 %   09/28/21 1557 98 °F (36.7 °C) 73 17 116/65 99 %     Oxygen Therapy  O2 Sat (%): 97 % (09/29/21 1145)  Pulse via Oximetry: 88 beats per minute (09/26/21 1729)  O2 Device: None (Room air) (09/29/21 4556)    Estimated body mass index is 26.47 kg/m² as calculated from the following:    Height as of this encounter: 5' 6\" (1.676 m). Weight as of this encounter: 74.4 kg (164 lb). Intake/Output Summary (Last 24 hours) at 9/29/2021 1343  Last data filed at 9/29/2021 0529  Gross per 24 hour   Intake 1697 ml   Output 1075 ml   Net 622 ml         Physical Exam:   Visit Vitals  BP (!) 100/59 (BP 1 Location: Right upper arm)   Pulse 76   Temp 98.4 °F (36.9 °C)   Resp 18   Ht 5' 6\" (1.676 m)   Wt 74.4 kg (164 lb)   SpO2 97%   BMI 26.47 kg/m²      General: No acute distress, speaking in full sentences, no use of accessory muscles. Patient speaks [place]. AOx3. HEENT: Left pupil slightly dilated than right which is chronic given his glaucoma. Next ocular muscle seems intact. Neck: Supple,   no JVD   Lungs: Clear to auscultation bilaterally   Cardiovascular: Regular rate and rhythm with normal S1 and S2   Abdomen: Soft, nontender, mildly distended, normoactive bowel sounds   Extremities: No cyanosis clubbing or edema. Neuro:  AOx3, moving all 4 extremities, speech normal..    Psych: Normal mood and affect     I have reviewed ordered lab tests and independently visualized imaging below:    Last 24hr Labs:  Recent Results (from the past 24 hour(s))   GLUCOSE, POC    Collection Time: 09/28/21  4:44 PM   Result Value Ref Range    Glucose (POC) 124 (H) 65 - 100 mg/dL    [facility] by [facility]    GLUCOSE, POC    Collection Time: 09/28/21  7:44 PM   Result Value Ref Range    Glucose (POC) 130 (H) 65 - 100 mg/dL    Performed by [facility]    METABOLIC PANEL, BASIC    Collection Time: 09/28/21  8:03 PM   Result Value Ref Range    Sodium 129 (L) 136 - 145 mmol/L    Potassium 3.7 3.5 - 5.1 mmol/L    Chloride 105 98 - 107 mmol/L    CO2 13 (L) 21 - 32 mmol/L    Anion gap 11 7 - 16 mmol/L    Glucose 143 (H) 65 - 100 mg/dL    BUN 30 (H) 8 - 23 MG/DL    Creatinine 1.18 0.8 - 1.5 MG/DL    GFR est AA >60 >60 ml/min/1.73m2    GFR est non-AA >60 >60 ml/min/1.73m2    Calcium 7.7 (L) 8.3 - 90.5 MG/DL   METABOLIC PANEL, BASIC    Collection Time: 09/29/21  6:59 AM   Result Value Ref Range    Sodium 133 (L) 136 - 145 mmol/L    Potassium 3.7 3.5 - 5.1 mmol/L    Chloride 109 (H) 98 - 107 mmol/L    CO2 14 (L) 21 - 32 mmol/L    Anion gap 10 7 - 16 mmol/L    Glucose 145 (H) 65 - 100 mg/dL    BUN 30 (H) 8 - 23 MG/DL    Creatinine 1.06 0.8 - 1.5 MG/DL    GFR est AA >60 >60 ml/min/1.73m2    GFR est non-AA >60 >60 ml/min/1.73m2    Calcium 7.7 (L) 8.3 - 10.4 MG/DL   GLUCOSE, POC    Collection Time: 09/29/21  8:14 AM   Result Value Ref Range    Glucose (POC) 140 (H) 65 - 100 mg/dL    Performed by [facility]    CBC WITH AUTOMATED DIFF    Collection Time: 09/29/21 10:17 AM   Result Value Ref Range    WBC 12.4 (H) 4.3 - 11.1 K/uL    RBC 4.36 4.23 - 5.6 M/uL    HGB 12.7 (L) 13.6 - 17.2 g/dL    HCT 36.6 (L) 41.1 - 50.3 %    MCV 83.9 79.6 - 97.8 FL    MCH 29.1 26.1 - 32.9 PG    MCHC 34.7 31.4 - 35.0 g/dL    RDW 13.2 11.9 - 14.6 %    PLATELET 580 313 - 098 K/uL    MPV 9.2 (L) 9.4 - 12.3 FL    ABSOLUTE NRBC 0.00 0.0 - 0.2 K/uL    DF AUTOMATED      NEUTROPHILS 77 43 - 78 %    LYMPHOCYTES 9 (L) 13 - 44 %    MONOCYTES 11 4.0 - 12.0 %    EOSINOPHILS 3 0.5 - 7.8 %    BASOPHILS 1 0.0 - 2.0 %    IMMATURE GRANULOCYTES 0 0.0 - 5.0 %    ABS. NEUTROPHILS 9.5 (H) 1.7 - 8.2 K/UL    ABS. LYMPHOCYTES 1.1 0.5 - 4.6 K/UL    ABS. MONOCYTES 1.3 0.1 - 1.3 K/UL    ABS. EOSINOPHILS 0.4 0.0 - 0.8 K/UL    ABS. BASOPHILS 0.1 0.0 - 0.2 K/UL    ABS. IMM.  GRANS. 0.1 0.0 - 0.5 K/UL   METABOLIC PANEL, COMPREHENSIVE    Collection Time: 09/29/21 10:17 AM   Result Value Ref Range    Sodium 133 (L) 136 - 145 mmol/L    Potassium 4.1 3.5 - 5.1 mmol/L    Chloride 109 (H) 98 - 107 mmol/L    CO2 15 (L) 21 - 32 mmol/L    Anion gap 9 7 - 16 mmol/L    Glucose 209 (H) 65 - 100 mg/dL    BUN 29 (H) 8 - 23 MG/DL    Creatinine 1.54 (H) 0.8 - 1.5 MG/DL    GFR est AA 58 (L) >60 ml/min/1.73m2    GFR est non-AA 48 (L) >60 ml/min/1.73m2    Calcium 7.8 (L) 8.3 - 10.4 MG/DL    Bilirubin, total 0.4 0.2 - 1.1 MG/DL    ALT (SGPT) 13 12 - 65 U/L    AST (SGOT) 14 (L) 15 - 37 U/L    Alk. phosphatase 49 (L) 50 - 136 U/L    Protein, total 7.3 6.3 - 8.2 g/dL    Albumin 3.1 (L) 3.2 - 4.6 g/dL    Globulin 4.2 (H) 2.3 - 3.5 g/dL    A-G Ratio 0.7 (L) 1.2 - 3.5     TSH 3RD GENERATION    Collection Time: 09/29/21 10:17 AM   Result Value Ref Range    TSH 1.730 uIU/mL   T4, FREE    Collection Time: 09/29/21 10:17 AM   Result Value Ref Range    T4, Free 1.2 0.78 - 1.46 NG/DL   GLUCOSE, POC    Collection Time: 09/29/21 11:34 AM   Result Value Ref Range    Glucose (POC) 222 (H) 65 - 100 mg/dL    Performed by [facility]        All Micro Results     None          Other Studies:  No results found.     Current Meds:  Current Facility-Administered Medications   Medication Dose Route Frequency    labetaloL (NORMODYNE;TRANDATE) injection 10 mg  10 mg IntraVENous Q4H PRN    sodium chloride tablet 1 g  1 g Oral BID WITH MEALS    promethazine (PHENERGAN) with saline injection 12.5 mg  12.5 mg IntraVENous Q4H PRN    HYDROcodone-homatropine (HYCODAN) 5-1.5 mg/5 mL (5 mL) oral solution 5 mL  5 mL Oral Q4H PRN    sodium chloride (NS) flush 5-40 mL  5-40 mL IntraVENous Q8H    sodium chloride (NS) flush 5-40 mL  5-40 mL IntraVENous PRN    acetaminophen (TYLENOL) tablet 650 mg  650 mg Oral Q6H PRN    Or    acetaminophen (TYLENOL) suppository 650 mg  650 mg Rectal Q6H PRN    polyethylene glycol (MIRALAX) packet 17 g  17 g Oral DAILY PRN    ondansetron (ZOFRAN ODT) tablet 4 mg  4 mg Oral Q8H PRN    Or    ondansetron (ZOFRAN) injection 4 mg  4 mg IntraVENous Q6H PRN    insulin lispro (HUMALOG) injection   SubCUTAneous AC&HS    amLODIPine (NORVASC) tablet 10 mg  10 mg Oral DAILY    fluticasone propionate (FLONASE) 50 mcg/actuation nasal spray 2 Spray  2 Spray Both Nostrils DAILY    [Held by provider] losartan (COZAAR) tablet 100 mg  100 mg Oral DAILY    pravastatin (PRAVACHOL) tablet 40 mg  40 mg Oral DAILY       Signed:  Werner Salamanca MD    Part of this note may have been written by using a voice dictation software. The note has been proof read but may still contain some grammatical/other typographical errors.

## 2021-09-29 NOTE — PROGRESS NOTES
Problem: Mobility Impaired (Adult and Pediatric)  Goal: *Acute Goals and Plan of Care (Insert Text)  Outcome: Progressing Towards Goal  Note:     LTG:  (1.)Mr. Maxi Pena will move from supine to sit and sit to supine  in bed with STAND BY ASSIST within 7 treatment day(s). (2.)Mr. Maxi Pena will transfer from bed to chair and chair to bed with MODIFIED INDEPENDENCE using the least restrictive device within 7 treatment day(s). (3.)Mr. Maxi Pena will ambulate with SUPERVISION for 650 feet with the least restrictive device within 7 treatment day(s). ________________________________________________________________________________________________        PHYSICAL THERAPY: Initial Assessment and AM 9/29/2021  INPATIENT: PT Visit Days : 1  Payor: Aramis Ramsey / Plan: [employer] / Product Type: Managed Care Medicare /       NAME/AGE/GENDER: Vernon Dorantes is a 80 y.o. male   PRIMARY DIAGNOSIS: Hyponatremia [E87.1]  Hypokalemia [E87.6] Acute hyponatremia Acute hyponatremia        ICD-10: Treatment Diagnosis:    Other abnormalities of gait and mobility (R26.89)   Precaution/Allergies:  Patient has no known allergies. ASSESSMENT:     Mr. Maxi Pena presents with limited strength and balance affecting his gait due to his admission. He normally is independent with all mobility without an assistive device. He fell at  home and bruised his ribs badly so bed mobility is painful and needs more assist. He transferred out of bed with PT assist and then stood and ambulated to the bathroom. He gets a bit in a hurry with mobility and it affects his safety. Ambulated in the hallway with RW and then returned to the room to sit up in a recliner. Spoke with daughter at bedside who translated. She lives with patient so he should be fine to go home. He may need HHPT but shouldn't need any assistive device at discharge. He will also need to practice stairs before going home.      This section established at most recent assessment   PROBLEM LIST (Impairments causing functional limitations):  Decreased Strength  Decreased Transfer Abilities  Decreased Ambulation Ability/Technique  Decreased Balance  Decreased Activity Tolerance   INTERVENTIONS PLANNED: (Benefits and precautions of physical therapy have been discussed with the patient.)  Bed Mobility  Gait Training  Therapeutic Exercise/Strengthening  Transfer Training     TREATMENT PLAN: Frequency/Duration: daily for duration of hospital stay  Rehabilitation Potential For Stated Goals: Good     REHAB RECOMMENDATIONS (at time of discharge pending progress):    Placement: It is my opinion, based on this patient's performance to date, that Mr. Sukhjinder Florentino may benefit from participating in 1-2 additional therapy sessions in order to continue to assess for rehab potential and then make recommendation for disposition at discharge. Equipment:   None at this time              HISTORY:   History of Present Injury/Illness (Reason for Referral):  Patient has been in his usual state of health, until 4 days prior to admission when he was walking upstairs and slipped and fell. He hit his head. There is no report of loss of consciousness. There was no witness at the time. Patient was brought to an urgent care at that time. Evaluation showed that he had rib contusion according to family member's account. Since the fall he has been weak. He has not been eating well. Niece reports that patient is confused off and on. There is no report of abnormal motor movement or seizure. No projectile vomiting. Patient reports no headache. No shortness of breath. No chest pain. Patient was brought to emergency room today. He was found to have sodium of 115, with low potassium. Also with elevated white blood cells. There is no history of fever, no chills. \"      Patient has been under treatment for hyponatremia which is improving. Sodium has improved from 115 to124 today. He is still confused sometimes. He has mittens in place. Past Medical History/Comorbidities:   Mr. Rei Disla  has a past medical history of Essential hypertension, benign, H/O appendicitis, Hyperlipidemia, Type 2 diabetes mellitus without complication ([facility] [address] and Wears dentures. He also has no past medical history of Difficult intubation, Malignant hyperthermia due to anesthesia, Nausea & vomiting, or Pseudocholinesterase deficiency. Mr. Rei Disla  has a past surgical history that includes hx appendectomy (1986); pr colonoscopy flx dx w/collj spec when pfrmd (5/22/2017); and colonoscopy (N/A, 5/22/2017). Social History/Living Environment:   Home Environment: Private residence  One/Two Story Residence: Two story  Living Alone: No  Support Systems: Spouse/Significant Other, Child(garrett)  Patient Expects to be Discharged to[de-identified] House  Current DME Used/Available at Home: CPAP  Prior Level of Function/Work/Activity:  Independent prior to admit without an assistive device     Number of Personal Factors/Comorbidities that affect the Plan of Care: 0: LOW COMPLEXITY   EXAMINATION:   Most Recent Physical Functioning:   Gross Assessment:  AROM: Within functional limits  Strength: Generally decreased, functional               Posture:     Balance:  Sitting: Intact  Standing: Pull to stand; With support Bed Mobility:  Rolling: Moderate assistance  Supine to Sit: Moderate assistance;Maximum assistance  Scooting: Contact guard assistance  Wheelchair Mobility:     Transfers:  Sit to Stand: Minimum assistance  Stand to Sit: Stand-by assistance  Bed to Chair: Contact guard assistance  Gait:     Speed/Nimo: Fluctuations  Gait Abnormalities: Path deviations  Distance (ft): 75 Feet (ft)  Assistive Device: Walker, rolling  Ambulation - Level of Assistance: Stand-by assistance;Contact guard assistance  Interventions: Safety awareness training;Verbal cues      Body Structures Involved:  Muscles Body Functions Affected:   Movement Related Activities and Participation Affected: Mobility   Number of elements that affect the Plan of Care: 3: MODERATE COMPLEXITY   CLINICAL PRESENTATION:   Presentation: Stable and uncomplicated: LOW COMPLEXITY   CLINICAL DECISION MAKING:   [facility] AM-PAC 6 Clicks   Basic Mobility Inpatient Short Form  How much difficulty does the patient currently have. .. Unable A Lot A Little None   1. Turning over in bed (including adjusting bedclothes, sheets and blankets)? [] 1   [x] 2   [] 3   [] 4   2. Sitting down on and standing up from a chair with arms ( e.g., wheelchair, bedside commode, etc.)   [] 1   [] 2   [x] 3   [] 4   3. Moving from lying on back to sitting on the side of the bed? [] 1   [x] 2   [] 3   [] 4   How much help from another person does the patient currently need. .. Total A Lot A Little None   4. Moving to and from a bed to a chair (including a wheelchair)? [] 1   [] 2   [x] 3   [] 4   5. Need to walk in hospital room? [] 1   [] 2   [x] 3   [] 4   6. Climbing 3-5 steps with a railing? [] 1   [] 2   [x] 3   [] 4   © 2007, Trustees of [facility], under license to [employer]. All rights reserved      Score:  Initial: 16 Most Recent: X (Date: -- )    Interpretation of Tool:  Represents activities that are increasingly more difficult (i.e. Bed mobility, Transfers, Gait). Medical Necessity:     Patient is expected to demonstrate progress in   strength, balance, and functional technique   to   increase independence with gait and transfers  . Reason for Services/Other Comments:  Patient continues to require skilled intervention due to   Limited functional independence  .    Use of outcome tool(s) and clinical judgement create a POC that gives a: Clear prediction of patient's progress: LOW COMPLEXITY            TREATMENT:   (In addition to Assessment/Re-Assessment sessions the following treatments were rendered)   Pre-treatment Symptoms/Complaints:  painful right side due to rib contusion  Pain: Initial:      Post Session: did not rate     Therapeutic Activity: (    25  minutes): Therapeutic activities including Bed transfers, Chair transfers, Toilet transfers, and Ambulation on level ground to improve mobility, strength, and balance. Required minimal Safety awareness training;Verbal cues to promote dynamic balance in standing. Assessment    Braces/Orthotics/Lines/Etc:   IV  O2 Device: None (Room air)  Treatment/Session Assessment:    Response to Treatment:  tolerated well. Does not like to have his right torso touched. Language barrier and impulsivity made gait a little erratic. Interdisciplinary Collaboration:   Physical Therapist  Registered Nurse  After treatment position/precautions:   Up in chair  Bed/Chair-wheels locked  Bed in low position  Call light within reach  RN notified  Family at bedside   Compliance with Program/Exercises: Compliant most of the time  Recommendations/Intent for next treatment session: \"Next visit will focus on advancements to more challenging activities and reduction in assistance provided\".   Total Treatment Duration:  PT Patient Time In/Time Out  Time In: 1020  Time Out: Bette 97, PT

## 2021-09-29 NOTE — PROGRESS NOTES
Hourly rounds completed throughout this shift. Pt's family in room at all times. Pt still drowsy but awakes spontaneously to voice and noise. Pt resting in bed; denies needs at this time. Will continue to monitor and report to oncoming night shift nurse.

## 2021-09-30 ENCOUNTER — APPOINTMENT (OUTPATIENT)
Dept: GENERAL RADIOLOGY | Age: 71
DRG: 643 | End: 2021-09-30
Attending: INTERNAL MEDICINE
Payer: MEDICARE

## 2021-09-30 PROBLEM — K76.0 FATTY LIVER: Status: ACTIVE | Noted: 2021-09-30

## 2021-09-30 LAB
ALBUMIN SERPL-MCNC: 3 G/DL (ref 3.2–4.6)
ALBUMIN/GLOB SERPL: 0.8 {RATIO} (ref 1.2–3.5)
ALP SERPL-CCNC: 48 U/L (ref 50–136)
ALT SERPL-CCNC: 13 U/L (ref 12–65)
ANION GAP SERPL CALC-SCNC: 7 MMOL/L (ref 7–16)
AST SERPL-CCNC: 15 U/L (ref 15–37)
BILIRUB SERPL-MCNC: 0.4 MG/DL (ref 0.2–1.1)
BUN SERPL-MCNC: 24 MG/DL (ref 8–23)
CALCIUM SERPL-MCNC: 8 MG/DL (ref 8.3–10.4)
CHLORIDE SERPL-SCNC: 111 MMOL/L (ref 98–107)
CO2 SERPL-SCNC: 16 MMOL/L (ref 21–32)
CREAT SERPL-MCNC: 1.13 MG/DL (ref 0.8–1.5)
GLOBULIN SER CALC-MCNC: 3.9 G/DL (ref 2.3–3.5)
GLUCOSE BLD STRIP.AUTO-MCNC: 144 MG/DL (ref 65–100)
GLUCOSE BLD STRIP.AUTO-MCNC: 163 MG/DL (ref 65–100)
GLUCOSE BLD STRIP.AUTO-MCNC: 179 MG/DL (ref 65–100)
GLUCOSE BLD STRIP.AUTO-MCNC: 269 MG/DL (ref 65–100)
GLUCOSE SERPL-MCNC: 148 MG/DL (ref 65–100)
POTASSIUM SERPL-SCNC: 3.7 MMOL/L (ref 3.5–5.1)
PROT SERPL-MCNC: 6.9 G/DL (ref 6.3–8.2)
SERVICE CMNT-IMP: ABNORMAL
SODIUM SERPL-SCNC: 132 MMOL/L (ref 136–145)
SODIUM SERPL-SCNC: 133 MMOL/L (ref 136–145)
SODIUM SERPL-SCNC: 134 MMOL/L (ref 136–145)

## 2021-09-30 PROCEDURE — 36415 COLL VENOUS BLD VENIPUNCTURE: CPT

## 2021-09-30 PROCEDURE — 74011250637 HC RX REV CODE- 250/637: Performed by: INTERNAL MEDICINE

## 2021-09-30 PROCEDURE — 71046 X-RAY EXAM CHEST 2 VIEWS: CPT

## 2021-09-30 PROCEDURE — 74011636637 HC RX REV CODE- 636/637: Performed by: INTERNAL MEDICINE

## 2021-09-30 PROCEDURE — 65660000000 HC RM CCU STEPDOWN

## 2021-09-30 PROCEDURE — 82962 GLUCOSE BLOOD TEST: CPT

## 2021-09-30 PROCEDURE — 84295 ASSAY OF SERUM SODIUM: CPT

## 2021-09-30 PROCEDURE — 74011000250 HC RX REV CODE- 250: Performed by: INTERNAL MEDICINE

## 2021-09-30 PROCEDURE — 97530 THERAPEUTIC ACTIVITIES: CPT

## 2021-09-30 PROCEDURE — 80053 COMPREHEN METABOLIC PANEL: CPT

## 2021-09-30 RX ORDER — GUAIFENESIN 100 MG/5ML
100 SOLUTION ORAL
Status: DISCONTINUED | OUTPATIENT
Start: 2021-09-30 | End: 2021-10-01 | Stop reason: HOSPADM

## 2021-09-30 RX ORDER — LOSARTAN POTASSIUM 50 MG/1
50 TABLET ORAL DAILY
Status: DISCONTINUED | OUTPATIENT
Start: 2021-09-30 | End: 2021-10-01

## 2021-09-30 RX ORDER — AMOXICILLIN 250 MG
2 CAPSULE ORAL 2 TIMES DAILY
Status: DISCONTINUED | OUTPATIENT
Start: 2021-09-30 | End: 2021-10-01 | Stop reason: HOSPADM

## 2021-09-30 RX ORDER — POLYETHYLENE GLYCOL 3350 17 G/17G
17 POWDER, FOR SOLUTION ORAL DAILY
Status: DISCONTINUED | OUTPATIENT
Start: 2021-09-30 | End: 2021-10-01 | Stop reason: HOSPADM

## 2021-09-30 RX ORDER — LIDOCAINE 4 G/100G
1 PATCH TOPICAL EVERY 24 HOURS
Status: DISCONTINUED | OUTPATIENT
Start: 2021-09-30 | End: 2021-10-01 | Stop reason: HOSPADM

## 2021-09-30 RX ADMIN — LOSARTAN POTASSIUM 50 MG: 50 TABLET, FILM COATED ORAL at 15:00

## 2021-09-30 RX ADMIN — FLUTICASONE PROPIONATE 2 SPRAY: 50 SPRAY, METERED NASAL at 08:40

## 2021-09-30 RX ADMIN — Medication 10 ML: at 13:54

## 2021-09-30 RX ADMIN — Medication 1 G: at 17:18

## 2021-09-30 RX ADMIN — PRAVASTATIN SODIUM 40 MG: 20 TABLET ORAL at 08:39

## 2021-09-30 RX ADMIN — DOCUSATE SODIUM 50 MG AND SENNOSIDES 8.6 MG 2 TABLET: 8.6; 5 TABLET, FILM COATED ORAL at 15:00

## 2021-09-30 RX ADMIN — Medication 10 ML: at 21:11

## 2021-09-30 RX ADMIN — INSULIN LISPRO 6 UNITS: 100 INJECTION, SOLUTION INTRAVENOUS; SUBCUTANEOUS at 12:37

## 2021-09-30 RX ADMIN — POLYETHYLENE GLYCOL 3350 17 G: 17 POWDER, FOR SOLUTION ORAL at 15:00

## 2021-09-30 RX ADMIN — AMLODIPINE BESYLATE 10 MG: 10 TABLET ORAL at 08:39

## 2021-09-30 RX ADMIN — INSULIN LISPRO 2 UNITS: 100 INJECTION, SOLUTION INTRAVENOUS; SUBCUTANEOUS at 16:16

## 2021-09-30 RX ADMIN — ACETAMINOPHEN 650 MG: 325 TABLET, FILM COATED ORAL at 14:16

## 2021-09-30 RX ADMIN — Medication 1 G: at 08:39

## 2021-09-30 RX ADMIN — Medication 10 ML: at 05:21

## 2021-09-30 RX ADMIN — GUAIFENESIN 100 MG: 200 SOLUTION ORAL at 15:34

## 2021-09-30 RX ADMIN — INSULIN LISPRO 2 UNITS: 100 INJECTION, SOLUTION INTRAVENOUS; SUBCUTANEOUS at 21:12

## 2021-09-30 NOTE — PROGRESS NOTES
Hospitalist Progress Note   Admit Date:  2021 12:49 PM   Name:  Nehemiah Acosta   Age:  80 y.o. Sex:  male  :  1950   MRN:  [id number]   Room:  [facility]/    Presenting Complaint: Fall    Reason(s) for Admission: Hyponatremia [E87.1]  Hypokalemia [E87.6]     Hospital Course & Interval History:       Patient is a 80 y.o. male with medical h/o   Hypertension  Dyslipidemia  Diabetes mellitus type 2     Patient has been in his usual state of health, until 4 days prior to admission when he was walking upstairs and slipped and fell. He hit his head. There is no report of loss of consciousness. There was no witness at the time.      Patient was brought to an urgent care at that time. Evaluation showed that he had rib contusion according to family member's account.       Since the fall he has been weak. He has not been eating well. Niece reports that patient is confused off and on. There is no report of abnormal motor movement or seizure. No projectile vomiting. Patient reports no headache. No shortness of breath. No chest pain.      Patient was brought to emergency room today. He was found to have sodium of 115, with low potassium. Also with elevated white blood cells. There is no history of fever, no chills. \"      Patient has been under treatment for hyponatremia which is improving. Sodium has improved from 115 to124 today. He is still confused sometimes. He has mittens in place. Subjective     Patient's daughter was at bedside. I have used [facility] interpretation services during interview to facilitate the interview. Still complaining of right-sided chest pain since fall. Feeling better as far as dizziness is concerned. Sodium staying stable. Denies any issues with passing urine. Denies any nausea, vomiting, chest pain or palpitation. Rest review of system negative except mentioned above.       Assessment & Plan:     Hyponatremia  Acute metabolic encephalopathy on admission  Likely secondary to chronic use of hydrochlorothiazide, with the setting of poor oral intake. He was on acetazolamide as well. Likely be responsible for his confusion, and acute metabolic encephalopathy on admission  We will continue with IV normal saline. Patient receive DDAVP to prevent overcorrection in a short time. Patient had high urine osmolarity with inappropriate high sodium in the urine although his serum sodium was low. This is consistent with SIADH. Patient has been receiving IV fluid for the past 48 hours. We will cut back on IV fluid rate. We will give more sodium supplementation. Monitor symptoms. Monitor neurological assessment.      September 29: Seems like patient was dehydrated on presentation per notes. Acetazolamide has been stopped and as per patient daughter, ophthalmologist recommended to stop acetazolamide and see them in clinic. Hydrochlorothiazide on hold. Sodium improving with 1 24-1 33 in 28 hours. Will monitor sodium every 8. Stop IV fluids. September 30: Sodium stable off IV fluids. Likely secondary to dehydration as patient was on hydrochlorothiazide and acetazolamide. Holding both. Ophthalmology okay to hold acetazolamide. TSH within normal limits. Cortisol level appropriate. Hypokalemia  Hypochloremia  Likely associated with volume depletion. In the setting of chronic use of hydrochlorothiazide. Continue with potassium supplementation. Check BMP this evening and tomorrow.      September 29: Resolved. Acute kidney injury: Unclear etiology. Renal ultrasound ordered. Hold losartan. Will monitor kidney function. September 30 8: Kidney function improved. Will give trial of losartan again given diabetes. Work-up negative so far. Right-sided chest pain: X-ray negative. Not hypoxic. Denies any other complaint. Incentive spirometer ordered. Will monitor. Hypertension  Resume home medications.   Monitor blood pressure  BP is 150/78 mmHg now. Patient is on amlodipine, losartan.      September 29: Holding losartan given MASTER. And labetalol ordered. September 30 8: Resume losartan. Diabetes mellitus type 2  Give diabetic diet  Check fingerstick blood sugar before each meal and at bedtime  Cover with insulin accordingly  Blood sugar is in acceptable range. Fatty liver: Discussed the importance of lifestyle modification. Discussed the importance of following with primary care for further evaluation as outpatient. Dispo/Discharge Planning: To be determined    Diet:  ADULT DIET Regular; 4 carb choices (60 gm/meal)  DVT PPx: SCD   Code status: Full Code     Patient's daughter at bedside. Plan discussed with patient and patient's daughter.     Hospital Problems as of 9/30/2021 Date Reviewed: 8/13/2021        Codes Class Noted - Resolved POA    Fatty liver ICD-10-CM: K76.0  ICD-9-CM: 571.8  9/30/2021 - Present Unknown        * (Principal) Acute hyponatremia ICD-10-CM: E87.1  ICD-9-CM: 276.1  9/26/2021 - Present Yes        Acute metabolic encephalopathy BHL-11-UL: G93.41  ICD-9-CM: 348.31  9/26/2021 - Present Yes        Hypokalemia ICD-10-CM: E87.6  ICD-9-CM: 276.8  9/26/2021 - Present Yes        Leukocytosis ICD-10-CM: D72.829  ICD-9-CM: 288.60  9/26/2021 - Present Yes        Volume depletion ICD-10-CM: E86.9  ICD-9-CM: 276.50  9/26/2021 - Present Yes        Rib contusion ICD-10-CM: S20.219A  ICD-9-CM: 922.1  9/26/2021 - Present Yes        History of recent fall ICD-10-CM: Z91.81  ICD-9-CM: V15.88  9/26/2021 - Present Yes        Hyponatremia ICD-10-CM: E87.1  ICD-9-CM: 276.1  9/26/2021 - Present Yes        Type 2 diabetes with nephropathy ([facility] [address] ICD-10-CM: E11.21  ICD-9-CM: 250.40, 583.81  4/4/2018 - Present Yes        Pure hypercholesterolemia ICD-10-CM: E78.00  ICD-9-CM: 272.0  5/24/2016 - Present Yes        Essential hypertension, benign ICD-10-CM: I10  ICD-9-CM: 401.1  Unknown - Present Yes Objective:     Patient Vitals for the past 24 hrs:   Temp Pulse Resp BP SpO2   09/30/21 1611 98.4 °F (36.9 °C) 82 18 (!) 116/59 98 %   09/30/21 1214 98.6 °F (37 °C) 86 18 (!) 154/72 97 %   09/30/21 0756 98.7 °F (37.1 °C) 74 18 131/71 97 %   09/30/21 0327 98.5 °F (36.9 °C) 73 20 137/72 98 %   09/29/21 2315 98.4 °F (36.9 °C) 72 20 129/69 98 %   09/29/21 1938 98.8 °F (37.1 °C) 82 20 138/75 98 %     Oxygen Therapy  O2 Sat (%): 98 % (09/30/21 1611)  Pulse via Oximetry: 88 beats per minute (09/26/21 1729)  O2 Device: None (Room air) (09/30/21 0748)    Estimated body mass index is 26.47 kg/m² as calculated from the following:    Height as of this encounter: 5' 6\" (1.676 m). Weight as of this encounter: 74.4 kg (164 lb). Intake/Output Summary (Last 24 hours) at 9/30/2021 1619  Last data filed at 9/30/2021 0748  Gross per 24 hour   Intake 240 ml   Output 1350 ml   Net -1110 ml         Physical Exam:   Visit Vitals  BP (!) 116/59 (BP 1 Location: Left upper arm, BP Patient Position: Supine)   Pulse 82   Temp 98.4 °F (36.9 °C)   Resp 18   Ht 5' 6\" (1.676 m)   Wt 74.4 kg (164 lb)   SpO2 98%   BMI 26.47 kg/m²      General: No acute distress, speaking in full sentences, no use of accessory muscles. Patient speaks [place]. AOx3. HEENT: Left pupil slightly dilated than right which is chronic given his glaucoma. Next ocular muscle seems intact. Neck: Supple,   no JVD   Lungs: Clear to auscultation bilaterally   Cardiovascular: Regular rate and rhythm with normal S1 and S2   Abdomen: Soft, nontender, mildly distended, normoactive bowel sounds   Extremities: No cyanosis clubbing or edema. Neuro:  AOx3, moving all 4 extremities, speech normal..    Psych: Normal mood and affect     I have reviewed ordered lab tests and independently visualized imaging below:    Last 24hr Labs:  Recent Results (from the past 24 hour(s))   GLUCOSE, POC    Collection Time: 09/29/21  4:43 PM   Result Value Ref Range    Glucose (POC) 130 (H) 65 - 100 mg/dL    Performed by Melva    GLUCOSE, POC    Collection Time: 09/29/21  9:21 PM   Result Value Ref Range    Glucose (POC) 201 (H) 65 - 100 mg/dL    Performed by Ronald    SODIUM    Collection Time: 09/29/21 10:00 PM   Result Value Ref Range    Sodium 134 (L) 136 - 145 mmol/L   SODIUM    Collection Time: 09/30/21  5:29 AM   Result Value Ref Range    Sodium 133 (L) 136 - 181 mmol/L   METABOLIC PANEL, COMPREHENSIVE    Collection Time: 09/30/21  5:29 AM   Result Value Ref Range    Sodium 134 (L) 136 - 145 mmol/L    Potassium 3.7 3.5 - 5.1 mmol/L    Chloride 111 (H) 98 - 107 mmol/L    CO2 16 (L) 21 - 32 mmol/L    Anion gap 7 7 - 16 mmol/L    Glucose 148 (H) 65 - 100 mg/dL    BUN 24 (H) 8 - 23 MG/DL    Creatinine 1.13 0.8 - 1.5 MG/DL    GFR est AA >60 >60 ml/min/1.73m2    GFR est non-AA >60 >60 ml/min/1.73m2    Calcium 8.0 (L) 8.3 - 10.4 MG/DL    Bilirubin, total 0.4 0.2 - 1.1 MG/DL    ALT (SGPT) 13 12 - 65 U/L    AST (SGOT) 15 15 - 37 U/L    Alk. phosphatase 48 (L) 50 - 136 U/L    Protein, total 6.9 6.3 - 8.2 g/dL    Albumin 3.0 (L) 3.2 - 4.6 g/dL    Globulin 3.9 (H) 2.3 - 3.5 g/dL    A-G Ratio 0.8 (L) 1.2 - 3.5     GLUCOSE, POC    Collection Time: 09/30/21  7:59 AM   Result Value Ref Range    Glucose (POC) 144 (H) 65 - 100 mg/dL    Performed by Addy    GLUCOSE, POC    Collection Time: 09/30/21 12:20 PM   Result Value Ref Range    Glucose (POC) 269 (H) 65 - 100 mg/dL    Performed by Addy    SODIUM    Collection Time: 09/30/21  2:06 PM   Result Value Ref Range    Sodium 132 (L) 136 - 145 mmol/L       All Micro Results     None          Other Studies:  US RETROPERITONEUM LTD    Result Date: 9/29/2021  RENAL ULTRASOUND INDICATION:  Acute kidney injury COMPARISON: None. TECHNIQUE: Real-time sonography of the kidneys, retroperitoneum and bladder was performed with multiple static images obtained.  FINDINGS: The right kidney measures 10.9 cm and the left kidney measures 11.7 cm in length. The kidneys have a normal echotexture. There is no hydronephrosis. There are no significant renal masses. Partially imaged liver is echogenic. The urinary bladder is unremarkable. 1.  Unremarkable sonographic evaluation of the kidneys. No hydronephrosis. 2.  Partially imaged liver is diffusely echogenic consistent with steatosis and/or fibrosis.        Current Meds:  Current Facility-Administered Medications   Medication Dose Route Frequency    senna-docusate (PERICOLACE) 8.6-50 mg per tablet 2 Tablet  2 Tablet Oral BID    polyethylene glycol (MIRALAX) packet 17 g  17 g Oral DAILY    losartan (COZAAR) tablet 50 mg  50 mg Oral DAILY    guaiFENesin (ROBITUSSIN) 100 mg/5 mL oral liquid 100 mg  100 mg Oral Q4H PRN    lidocaine 4 % patch 1 Patch  1 Patch TransDERmal Q24H    labetaloL (NORMODYNE;TRANDATE) injection 10 mg  10 mg IntraVENous Q4H PRN    netarsudiL-latanoprost  (Rocklatan) 0.02-0.005 % drop  (Patient Supplied)  1 Drop Left Eye QPM    sodium chloride tablet 1 g  1 g Oral BID WITH MEALS    promethazine (PHENERGAN) with saline injection 12.5 mg  12.5 mg IntraVENous Q4H PRN    HYDROcodone-homatropine (HYCODAN) 5-1.5 mg/5 mL (5 mL) oral solution 5 mL  5 mL Oral Q4H PRN    sodium chloride (NS) flush 5-40 mL  5-40 mL IntraVENous Q8H    sodium chloride (NS) flush 5-40 mL  5-40 mL IntraVENous PRN    acetaminophen (TYLENOL) tablet 650 mg  650 mg Oral Q6H PRN    Or    acetaminophen (TYLENOL) suppository 650 mg  650 mg Rectal Q6H PRN    polyethylene glycol (MIRALAX) packet 17 g  17 g Oral DAILY PRN    ondansetron (ZOFRAN ODT) tablet 4 mg  4 mg Oral Q8H PRN    Or    ondansetron (ZOFRAN) injection 4 mg  4 mg IntraVENous Q6H PRN    insulin lispro (HUMALOG) injection   SubCUTAneous AC&HS    amLODIPine (NORVASC) tablet 10 mg  10 mg Oral DAILY    fluticasone propionate (FLONASE) 50 mcg/actuation nasal spray 2 Spray  2 Spray Both Nostrils DAILY    [Held by provider] losartan (COZAAR) tablet 100 mg  100 mg Oral DAILY    pravastatin (PRAVACHOL) tablet 40 mg  40 mg Oral DAILY       Signed:  Lyn Marie MD    Part of this note may have been written by using a voice dictation software. The note has been proof read but may still contain some grammatical/other typographical errors.

## 2021-09-30 NOTE — PROGRESS NOTES
Problem: Mobility Impaired (Adult and Pediatric)  Goal: *Acute Goals and Plan of Care (Insert Text)  Outcome: Progressing Towards Goal  Note:     LTG:  (1.)Mr. Maralee Litten will move from supine to sit and sit to supine  in bed with STAND BY ASSIST within 7 treatment day(s). Goal met  (2.)Mr. Maralee Litten will transfer from bed to chair and chair to bed with MODIFIED INDEPENDENCE using the least restrictive device within 7 treatment day(s). (3.)Mr. Maralee Litten will ambulate with SUPERVISION for 650 feet with the least restrictive device within 7 treatment day(s). ________________________________________________________________________________________________        PHYSICAL THERAPY: Daily Note and AM 9/30/2021  INPATIENT: PT Visit Days : 2  Payor: Husam Ledbetter / Plan: [address] / Product Type: Managed Care Medicare /       NAME/AGE/GENDER: July Patel is a 80 y.o. male   PRIMARY DIAGNOSIS: Hyponatremia [E87.1]  Hypokalemia [E87.6] Acute hyponatremia Acute hyponatremia       ICD-10: Treatment Diagnosis:    Other abnormalities of gait and mobility (R26.89)   Precaution/Allergies:  Patient has no known allergies. ASSESSMENT:     Mr. Maralee Litten presents with limited strength and balance affecting his gait due to his admission. He normally is independent with all mobility without an assistive device. He fell at  home and bruised his ribs badly so bed mobility is painful and needs more assist. He transferred out of bed with PT assist and then stood and ambulated to the bathroom. He gets a bit in a hurry with mobility and it affects his safety. Ambulated in the hallway with RW and then returned to the room to sit up in a recliner. Spoke with daughter at bedside who translated. She lives with patient so he should be fine to go home. He may need HHPT but shouldn't need any assistive device at discharge. He will also need to practice stairs before going home. 9/30- supine on contact.  He has a lot of pain with rolling and coming supine to sit. He does not want help and was eventually able to come to sit. Stood and ambulated 200 ft in the halls without an assistive device with CGA at most. He returned to the room and stayed sitting up in the recliner with his daughter present. He declined home health therapy and the daughter feels he would not use the rolling walker at home so he does not need equipment. This section established at most recent assessment   PROBLEM LIST (Impairments causing functional limitations):  Decreased Strength  Decreased Transfer Abilities  Decreased Ambulation Ability/Technique  Decreased Balance  Decreased Activity Tolerance   INTERVENTIONS PLANNED: (Benefits and precautions of physical therapy have been discussed with the patient.)  Bed Mobility  Gait Training  Therapeutic Exercise/Strengthening  Transfer Training     TREATMENT PLAN: Frequency/Duration: daily for duration of hospital stay  Rehabilitation Potential For Stated Goals: Good     REHAB RECOMMENDATIONS (at time of discharge pending progress):    Placement: It is my opinion, based on this patient's performance to date, that Mr. Rei Disla may benefit from participating in 1-2 additional therapy sessions in order to continue to assess for rehab potential and then make recommendation for disposition at discharge. Equipment:   None at this time              HISTORY:   History of Present Injury/Illness (Reason for Referral):  Patient has been in his usual state of health, until 4 days prior to admission when he was walking upstairs and slipped and fell. He hit his head. There is no report of loss of consciousness. There was no witness at the time. Patient was brought to an urgent care at that time. Evaluation showed that he had rib contusion according to family member's account. Since the fall he has been weak. He has not been eating well. Niece reports that patient is confused off and on.   There is no report of abnormal motor movement or seizure. No projectile vomiting. Patient reports no headache. No shortness of breath. No chest pain. Patient was brought to emergency room today. He was found to have sodium of 115, with low potassium. Also with elevated white blood cells. There is no history of fever, no chills. \"      Patient has been under treatment for hyponatremia which is improving. Sodium has improved from 115 to124 today. He is still confused sometimes. He has mittens in place. Past Medical History/Comorbidities:   Mr. Alberto Borges  has a past medical history of Essential hypertension, benign, H/O appendicitis, Hyperlipidemia, Type 2 diabetes mellitus without complication ([address] and Wears dentures. He also has no past medical history of Difficult intubation, Malignant hyperthermia due to anesthesia, Nausea & vomiting, or Pseudocholinesterase deficiency. Mr. Alberto Borges  has a past surgical history that includes hx appendectomy (1986); pr colonoscopy flx dx w/collj spec when pfrmd (5/22/2017); and colonoscopy (N/A, 5/22/2017). Social History/Living Environment:   Home Environment: Private residence  One/Two Story Residence: Two story  Living Alone: No  Support Systems: Spouse/Significant Other, Child(garrett)  Patient Expects to be Discharged to[de-identified] House  Current DME Used/Available at Home: CPAP  Prior Level of Function/Work/Activity:  Independent prior to admit without an assistive device     Number of Personal Factors/Comorbidities that affect the Plan of Care: 0: LOW COMPLEXITY   EXAMINATION:   Most Recent Physical Functioning:   Gross Assessment:  AROM: Within functional limits  Strength: Generally decreased, functional               Posture:     Balance:  Sitting: Intact  Standing: Pull to stand; Without support Bed Mobility:  Rolling: Stand-by assistance;Contact guard assistance  Supine to Sit: Stand-by assistance;Contact guard assistance (for safety)  Scooting: Stand-by assistance  Wheelchair Mobility:     Transfers:  Sit to Stand: Stand-by assistance  Stand to Sit: Stand-by assistance  Bed to Chair: Contact guard assistance  Gait:     Speed/Nimo: Pace decreased (<100 feet/min)  Step Length: Right shortened;Left shortened  Gait Abnormalities: Trunk sway increased; Path deviations;Decreased step clearance  Distance (ft): 200 Feet (ft)  Assistive Device: Walker, rolling  Ambulation - Level of Assistance: Contact guard assistance  Interventions: Safety awareness training;Verbal cues      Body Structures Involved:  Muscles Body Functions Affected: Movement Related Activities and Participation Affected: Mobility   Number of elements that affect the Plan of Care: 3: MODERATE COMPLEXITY   CLINICAL PRESENTATION:   Presentation: Stable and uncomplicated: LOW COMPLEXITY   CLINICAL DECISION MAKIN[address] [place] [id number] AM-PAC 6 Clicks   Basic Mobility Inpatient Short Form  How much difficulty does the patient currently have. .. Unable A Lot A Little None   1. Turning over in bed (including adjusting bedclothes, sheets and blankets)? [] 1   [x] 2   [] 3   [] 4   2. Sitting down on and standing up from a chair with arms ( e.g., wheelchair, bedside commode, etc.)   [] 1   [] 2   [x] 3   [] 4   3. Moving from lying on back to sitting on the side of the bed? [] 1   [x] 2   [] 3   [] 4   How much help from another person does the patient currently need. .. Total A Lot A Little None   4. Moving to and from a bed to a chair (including a wheelchair)? [] 1   [] 2   [x] 3   [] 4   5. Need to walk in hospital room? [] 1   [] 2   [x] 3   [] 4   6. Climbing 3-5 steps with a railing? [] 1   [] 2   [x] 3   [] 4   © , Trustees of [address] [place] [address] [address] [id number], under license to [employer]. All rights reserved      Score:  Initial: 16 Most Recent: X (Date: -- )    Interpretation of Tool:  Represents activities that are increasingly more difficult (i.e. Bed mobility, Transfers, Gait).     Medical Necessity:     Patient is expected to demonstrate progress in   strength, balance, and functional technique   to   increase independence with gait and transfers  . Reason for Services/Other Comments:  Patient continues to require skilled intervention due to   Limited functional independence  . Use of outcome tool(s) and clinical judgement create a POC that gives a: Clear prediction of patient's progress: LOW COMPLEXITY            TREATMENT:   (In addition to Assessment/Re-Assessment sessions the following treatments were rendered)   Pre-treatment Symptoms/Complaints:  painful right side due to rib contusion  Pain: Initial:      Post Session: did not rate     Therapeutic Activity: (    25  minutes): Therapeutic activities including Bed transfers, Chair transfers, and Ambulation on level ground to improve mobility, strength, and balance. Required minimal Safety awareness training;Verbal cues to promote dynamic balance in standing. Braces/Orthotics/Lines/Etc:   O2 Device: None (Room air)  Treatment/Session Assessment:    Response to Treatment:  tolerated well. More sore today in his ribs  Interdisciplinary Collaboration:   Physical Therapist  Registered Nurse    After treatment position/precautions:   Up in chair  Bed/Chair-wheels locked  Bed in low position  Call light within reach  RN notified  Family at bedside   Compliance with Program/Exercises: Compliant most of the time  Recommendations/Intent for next treatment session: \"Next visit will focus on advancements to more challenging activities and reduction in assistance provided\".   Total Treatment Duration:  PT Patient Time In/Time Out  Time In: 1040  Time Out: 1850 [facility]

## 2021-09-30 NOTE — PROGRESS NOTES
Problem: Falls - Risk of  Goal: *Absence of Falls  Description: Document Antwon Montenegro Fall Risk and appropriate interventions in the flowsheet. Outcome: Progressing Towards Goal  Note: Fall Risk Interventions:  Mobility Interventions: Assess mobility with egress test, Bed/chair exit alarm, Patient to call before getting OOB    Mentation Interventions: Adequate sleep, hydration, pain control, Bed/chair exit alarm, Increase mobility    Medication Interventions: Assess postural VS orthostatic hypotension, Bed/chair exit alarm, Patient to call before getting OOB    Elimination Interventions: Bed/chair exit alarm, Call light in reach, Patient to call for help with toileting needs    History of Falls Interventions: Bed/chair exit alarm, Consult care management for discharge planning, Investigate reason for fall         Problem: Patient Education: Go to Patient Education Activity  Goal: Patient/Family Education  Outcome: Progressing Towards Goal     Problem: Pressure Injury - Risk of  Goal: *Prevention of pressure injury  Description: Document Haile Scale and appropriate interventions in the flowsheet.   Outcome: Progressing Towards Goal  Note: Pressure Injury Interventions:  Sensory Interventions: Assess changes in LOC, Assess need for specialty bed    Moisture Interventions: Absorbent underpads    Activity Interventions: Assess need for specialty bed, Chair cushion, Pressure redistribution bed/mattress(bed type)    Mobility Interventions: Assess need for specialty bed, Chair cushion, Pressure redistribution bed/mattress (bed type)    Nutrition Interventions: Document food/fluid/supplement intake    Friction and Shear Interventions: HOB 30 degrees or less                Problem: Patient Education: Go to Patient Education Activity  Goal: Patient/Family Education  Outcome: Progressing Towards Goal     Problem: Diabetes Self-Management  Goal: *Disease process and treatment process  Description: Define diabetes and identify own type of diabetes; list 3 options for treating diabetes. Outcome: Progressing Towards Goal  Goal: *Incorporating nutritional management into lifestyle  Description: Describe effect of type, amount and timing of food on blood glucose; list 3 methods for planning meals. Outcome: Progressing Towards Goal  Goal: *Incorporating physical activity into lifestyle  Description: State effect of exercise on blood glucose levels. Outcome: Progressing Towards Goal  Goal: *Developing strategies to promote health/change behavior  Description: Define the ABC's of diabetes; identify appropriate screenings, schedule and personal plan for screenings. Outcome: Progressing Towards Goal  Goal: *Using medications safely  Description: State effect of diabetes medications on diabetes; name diabetes medication taking, action and side effects. Outcome: Progressing Towards Goal  Goal: *Monitoring blood glucose, interpreting and using results  Description: Identify recommended blood glucose targets  and personal targets. Outcome: Progressing Towards Goal  Goal: *Prevention, detection, treatment of acute complications  Description: List symptoms of hyper- and hypoglycemia; describe how to treat low blood sugar and actions for lowering  high blood glucose level. Outcome: Progressing Towards Goal  Goal: *Prevention, detection and treatment of chronic complications  Description: Define the natural course of diabetes and describe the relationship of blood glucose levels to long term complications of diabetes.   Outcome: Progressing Towards Goal     Problem: Patient Education: Go to Patient Education Activity  Goal: Patient/Family Education  Outcome: Progressing Towards Goal

## 2021-09-30 NOTE — PROGRESS NOTES
Care Management Interventions  Support Systems: Spouse/Significant Other, Child(garrett)  Confirm Follow Up Transport: Family  Discharge Location  Discharge Placement: Home    Saw pt in interdisciplinarily rounds with the following disciplines: Physician, Case Management, Nursing, Dietary,Therapy and Pharmacy. The plan of care was discussed along with goals for discharge date/ location. Per , pt may be medically stable for d/c on tomorrow, pt's sodium level is now stable. Pt does not have any needs or concerns at this time.  SW will follow pt until d/c    ALYSHA Bonilla

## 2021-09-30 NOTE — PROGRESS NOTES
END OF SHIFT NOTE:      - pt sleeping most of day saying he didn't sleep much last night  - cough worsening today. CXR ordered and Robitussin given  - daughter/niece at bedside through shift  - Alert and Oriented x4  - No BM since Sunday. Miralax and Pericolace given  - cough hurting side, lidocaine patch placed on R ribs, tylenol given 1x    Intake/Output  09/30 0701 - 09/30 1900  In: 238 [P.O.:238]  Out: 150 [Urine:150]   Voiding: YES  Catheter: NO  Drain:              Stool:  0 occurrences. Emesis:  0 occurrences. VITAL SIGNS  Patient Vitals for the past 12 hrs:   Temp Pulse Resp BP SpO2   09/30/21 1611 98.4 °F (36.9 °C) 82 18 (!) 116/59 98 %   09/30/21 1214 98.6 °F (37 °C) 86 18 (!) 154/72 97 %   09/30/21 0756 98.7 °F (37.1 °C) 74 18 131/71 97 %       Pain Assessment  Pain 1  Pain Scale 1: Visual (09/30/21 0752)  Pain Intensity 1: 0 (09/30/21 0752)  Patient Stated Pain Goal: 0 (09/30/21 0752)  Pain Location 1: Chest (09/29/21 1940)  Pain Orientation 1: Right; Anterior (09/29/21 1940)  Pain Description 1: Sharp (09/29/21 1940)  Pain Intervention(s) 1: Medication (see MAR) (09/29/21 1940)    Ambulating  Yes      Reilly Guerra RN

## 2021-09-30 NOTE — PROGRESS NOTES
Problem: Falls - Risk of  Goal: *Absence of Falls  Description: Document Nick Rouse Fall Risk and appropriate interventions in the flowsheet. Outcome: Progressing Towards Goal  Note: Fall Risk Interventions:  Mobility Interventions: Assess mobility with egress test    Mentation Interventions: Door open when patient unattended    Medication Interventions: Patient to call before getting OOB    Elimination Interventions: Call light in reach    History of Falls Interventions: Bed/chair exit alarm      Problem: Pressure Injury - Risk of  Goal: *Prevention of pressure injury  Description: Document Haile Scale and appropriate interventions in the flowsheet.   Outcome: Progressing Towards Goal  Note: Pressure Injury Interventions:  Sensory Interventions: Assess changes in LOC    Moisture Interventions: Absorbent underpads    Activity Interventions: Pressure redistribution bed/mattress(bed type)    Mobility Interventions: Float heels    Nutrition Interventions: Document food/fluid/supplement intake    Friction and Shear Interventions: HOB 30 degrees or less

## 2021-10-01 VITALS
RESPIRATION RATE: 18 BRPM | WEIGHT: 164 LBS | OXYGEN SATURATION: 98 % | HEART RATE: 72 BPM | SYSTOLIC BLOOD PRESSURE: 160 MMHG | DIASTOLIC BLOOD PRESSURE: 74 MMHG | TEMPERATURE: 98.2 F | HEIGHT: 66 IN | BODY MASS INDEX: 26.36 KG/M2

## 2021-10-01 PROBLEM — D72.829 LEUKOCYTOSIS: Status: RESOLVED | Noted: 2021-09-26 | Resolved: 2021-10-01

## 2021-10-01 PROBLEM — G93.41 ACUTE METABOLIC ENCEPHALOPATHY: Status: RESOLVED | Noted: 2021-09-26 | Resolved: 2021-10-01

## 2021-10-01 PROBLEM — E87.6 HYPOKALEMIA: Status: RESOLVED | Noted: 2021-09-26 | Resolved: 2021-10-01

## 2021-10-01 PROBLEM — N17.9 AKI (ACUTE KIDNEY INJURY) (HCC): Status: ACTIVE | Noted: 2021-10-01

## 2021-10-01 PROBLEM — E86.9 VOLUME DEPLETION: Status: RESOLVED | Noted: 2021-09-26 | Resolved: 2021-10-01

## 2021-10-01 PROBLEM — N17.9 AKI (ACUTE KIDNEY INJURY) (HCC): Status: RESOLVED | Noted: 2021-10-01 | Resolved: 2021-10-01

## 2021-10-01 PROBLEM — E87.1 HYPONATREMIA: Status: RESOLVED | Noted: 2021-09-26 | Resolved: 2021-10-01

## 2021-10-01 LAB
ANION GAP SERPL CALC-SCNC: 4 MMOL/L (ref 7–16)
BUN SERPL-MCNC: 23 MG/DL (ref 8–23)
CALCIUM SERPL-MCNC: 8.2 MG/DL (ref 8.3–10.4)
CHLORIDE SERPL-SCNC: 108 MMOL/L (ref 98–107)
CO2 SERPL-SCNC: 19 MMOL/L (ref 21–32)
CREAT SERPL-MCNC: 1.04 MG/DL (ref 0.8–1.5)
GLUCOSE BLD STRIP.AUTO-MCNC: 130 MG/DL (ref 65–100)
GLUCOSE BLD STRIP.AUTO-MCNC: 180 MG/DL (ref 65–100)
GLUCOSE SERPL-MCNC: 132 MG/DL (ref 65–100)
POTASSIUM SERPL-SCNC: 4.3 MMOL/L (ref 3.5–5.1)
SERVICE CMNT-IMP: ABNORMAL
SERVICE CMNT-IMP: ABNORMAL
SODIUM SERPL-SCNC: 131 MMOL/L (ref 136–145)
SODIUM SERPL-SCNC: 132 MMOL/L (ref 136–145)

## 2021-10-01 PROCEDURE — 82962 GLUCOSE BLOOD TEST: CPT

## 2021-10-01 PROCEDURE — 74011250637 HC RX REV CODE- 250/637: Performed by: INTERNAL MEDICINE

## 2021-10-01 PROCEDURE — 80048 BASIC METABOLIC PNL TOTAL CA: CPT

## 2021-10-01 PROCEDURE — 74011636637 HC RX REV CODE- 636/637: Performed by: INTERNAL MEDICINE

## 2021-10-01 PROCEDURE — 84295 ASSAY OF SERUM SODIUM: CPT

## 2021-10-01 PROCEDURE — 36415 COLL VENOUS BLD VENIPUNCTURE: CPT

## 2021-10-01 RX ORDER — LOSARTAN POTASSIUM 50 MG/1
100 TABLET ORAL DAILY
Status: DISCONTINUED | OUTPATIENT
Start: 2021-10-02 | End: 2021-10-01 | Stop reason: HOSPADM

## 2021-10-01 RX ORDER — LOSARTAN POTASSIUM 50 MG/1
50 TABLET ORAL ONCE
Status: COMPLETED | OUTPATIENT
Start: 2021-10-01 | End: 2021-10-01

## 2021-10-01 RX ORDER — LIDOCAINE 4 G/100G
PATCH TOPICAL
Qty: 30 PATCH | Refills: 0 | Status: SHIPPED | OUTPATIENT
Start: 2021-10-01

## 2021-10-01 RX ADMIN — Medication 1 G: at 08:15

## 2021-10-01 RX ADMIN — DOCUSATE SODIUM 50 MG AND SENNOSIDES 8.6 MG 2 TABLET: 8.6; 5 TABLET, FILM COATED ORAL at 08:15

## 2021-10-01 RX ADMIN — LOSARTAN POTASSIUM 50 MG: 50 TABLET, FILM COATED ORAL at 10:48

## 2021-10-01 RX ADMIN — AMLODIPINE BESYLATE 10 MG: 10 TABLET ORAL at 08:16

## 2021-10-01 RX ADMIN — LOSARTAN POTASSIUM 50 MG: 50 TABLET, FILM COATED ORAL at 08:15

## 2021-10-01 RX ADMIN — Medication 10 ML: at 06:15

## 2021-10-01 RX ADMIN — INSULIN LISPRO 2 UNITS: 100 INJECTION, SOLUTION INTRAVENOUS; SUBCUTANEOUS at 10:57

## 2021-10-01 RX ADMIN — POLYETHYLENE GLYCOL 3350 17 G: 17 POWDER, FOR SOLUTION ORAL at 08:19

## 2021-10-01 RX ADMIN — FLUTICASONE PROPIONATE 2 SPRAY: 50 SPRAY, METERED NASAL at 08:17

## 2021-10-01 RX ADMIN — PRAVASTATIN SODIUM 40 MG: 20 TABLET ORAL at 08:16

## 2021-10-01 NOTE — PROGRESS NOTES
Problem: Falls - Risk of  Goal: *Absence of Falls  Description: Document Jonas Gardner Fall Risk and appropriate interventions in the flowsheet. Outcome: Resolved/Met  Note: Fall Risk Interventions:  Mobility Interventions: Assess mobility with egress test, Strengthening exercises (ROM-active/passive)    Mentation Interventions: Adequate sleep, hydration, pain control    Medication Interventions: Patient to call before getting OOB, Teach patient to arise slowly    Elimination Interventions: Call light in reach, Bed/chair exit alarm    History of Falls Interventions: Bed/chair exit alarm         Problem: Patient Education: Go to Patient Education Activity  Goal: Patient/Family Education  Outcome: Resolved/Met     Problem: Pressure Injury - Risk of  Goal: *Prevention of pressure injury  Description: Document Haile Scale and appropriate interventions in the flowsheet. Outcome: Resolved/Met  Note: Pressure Injury Interventions:  Sensory Interventions: Assess changes in LOC, Minimize linen layers    Moisture Interventions: Absorbent underpads, Maintain skin hydration (lotion/cream), Minimize layers    Activity Interventions: Assess need for specialty bed, Pressure redistribution bed/mattress(bed type)    Mobility Interventions: Assess need for specialty bed    Nutrition Interventions: Document food/fluid/supplement intake    Friction and Shear Interventions: HOB 30 degrees or less                Problem: Patient Education: Go to Patient Education Activity  Goal: Patient/Family Education  Outcome: Resolved/Met     Problem: Diabetes Self-Management  Goal: *Disease process and treatment process  Description: Define diabetes and identify own type of diabetes; list 3 options for treating diabetes. Outcome: Resolved/Met  Goal: *Incorporating nutritional management into lifestyle  Description: Describe effect of type, amount and timing of food on blood glucose; list 3 methods for planning meals.   Outcome: Resolved/Met  Goal: *Incorporating physical activity into lifestyle  Description: State effect of exercise on blood glucose levels. Outcome: Resolved/Met  Goal: *Developing strategies to promote health/change behavior  Description: Define the ABC's of diabetes; identify appropriate screenings, schedule and personal plan for screenings. Outcome: Resolved/Met  Goal: *Using medications safely  Description: State effect of diabetes medications on diabetes; name diabetes medication taking, action and side effects. Outcome: Resolved/Met  Goal: *Monitoring blood glucose, interpreting and using results  Description: Identify recommended blood glucose targets  and personal targets. Outcome: Resolved/Met  Goal: *Prevention, detection, treatment of acute complications  Description: List symptoms of hyper- and hypoglycemia; describe how to treat low blood sugar and actions for lowering  high blood glucose level. Outcome: Resolved/Met  Goal: *Prevention, detection and treatment of chronic complications  Description: Define the natural course of diabetes and describe the relationship of blood glucose levels to long term complications of diabetes.   Outcome: Resolved/Met     Problem: Patient Education: Go to Patient Education Activity  Goal: Patient/Family Education  Outcome: Resolved/Met     Problem: General Medical Care Plan  Goal: *Vital signs within specified parameters  Outcome: Resolved/Met  Goal: *Labs within defined limits  Outcome: Resolved/Met  Goal: *Absence of infection signs and symptoms  Outcome: Resolved/Met  Goal: *Optimal pain control at patient's stated goal  Outcome: Resolved/Met  Goal: *Skin integrity maintained  Outcome: Resolved/Met  Goal: *Fluid volume balance  Outcome: Resolved/Met  Goal: *Optimize nutritional status  Outcome: Resolved/Met  Goal: *Anxiety reduced or absent  Outcome: Resolved/Met  Goal: *Progressive mobility and function (eg: ADL's)  Outcome: Resolved/Met     Problem: Patient Education: Go to Patient Education Activity  Goal: Patient/Family Education  Outcome: Resolved/Met     Problem: Patient Education: Go to Patient Education Activity  Goal: Patient/Family Education  Outcome: Resolved/Met     Problem: Falls - Risk of  Goal: *Absence of Falls  Description: Document Asha Fall Risk and appropriate interventions in the flowsheet. Outcome: Resolved/Met  Note: Fall Risk Interventions:  Mobility Interventions: Assess mobility with egress test, Strengthening exercises (ROM-active/passive)    Mentation Interventions: Adequate sleep, hydration, pain control    Medication Interventions: Patient to call before getting OOB, Teach patient to arise slowly    Elimination Interventions: Call light in reach, Bed/chair exit alarm    History of Falls Interventions: Bed/chair exit alarm         Problem: Patient Education: Go to Patient Education Activity  Goal: Patient/Family Education  Outcome: Resolved/Met     Problem: Pressure Injury - Risk of  Goal: *Prevention of pressure injury  Description: Document Haile Scale and appropriate interventions in the flowsheet. Outcome: Resolved/Met  Note: Pressure Injury Interventions:  Sensory Interventions: Assess changes in LOC, Minimize linen layers    Moisture Interventions: Absorbent underpads, Maintain skin hydration (lotion/cream), Minimize layers    Activity Interventions: Assess need for specialty bed, Pressure redistribution bed/mattress(bed type)    Mobility Interventions: Assess need for specialty bed    Nutrition Interventions: Document food/fluid/supplement intake    Friction and Shear Interventions: HOB 30 degrees or less                Problem: Patient Education: Go to Patient Education Activity  Goal: Patient/Family Education  Outcome: Resolved/Met     Problem: Diabetes Self-Management  Goal: *Disease process and treatment process  Description: Define diabetes and identify own type of diabetes; list 3 options for treating diabetes.   Outcome: Resolved/Met  Goal: *Incorporating nutritional management into lifestyle  Description: Describe effect of type, amount and timing of food on blood glucose; list 3 methods for planning meals. Outcome: Resolved/Met  Goal: *Incorporating physical activity into lifestyle  Description: State effect of exercise on blood glucose levels. Outcome: Resolved/Met  Goal: *Developing strategies to promote health/change behavior  Description: Define the ABC's of diabetes; identify appropriate screenings, schedule and personal plan for screenings. Outcome: Resolved/Met  Goal: *Using medications safely  Description: State effect of diabetes medications on diabetes; name diabetes medication taking, action and side effects. Outcome: Resolved/Met  Goal: *Monitoring blood glucose, interpreting and using results  Description: Identify recommended blood glucose targets  and personal targets. Outcome: Resolved/Met  Goal: *Prevention, detection, treatment of acute complications  Description: List symptoms of hyper- and hypoglycemia; describe how to treat low blood sugar and actions for lowering  high blood glucose level. Outcome: Resolved/Met  Goal: *Prevention, detection and treatment of chronic complications  Description: Define the natural course of diabetes and describe the relationship of blood glucose levels to long term complications of diabetes.   Outcome: Resolved/Met     Problem: Patient Education: Go to Patient Education Activity  Goal: Patient/Family Education  Outcome: Resolved/Met     Problem: General Medical Care Plan  Goal: *Vital signs within specified parameters  Outcome: Resolved/Met  Goal: *Labs within defined limits  Outcome: Resolved/Met  Goal: *Absence of infection signs and symptoms  Outcome: Resolved/Met  Goal: *Optimal pain control at patient's stated goal  Outcome: Resolved/Met  Goal: *Skin integrity maintained  Outcome: Resolved/Met  Goal: *Fluid volume balance  Outcome: Resolved/Met  Goal: *Optimize nutritional status  Outcome: Resolved/Met  Goal: *Anxiety reduced or absent  Outcome: Resolved/Met  Goal: *Progressive mobility and function (eg: ADL's)  Outcome: Resolved/Met     Problem: Patient Education: Go to Patient Education Activity  Goal: Patient/Family Education  Outcome: Resolved/Met     Problem: Patient Education: Go to Patient Education Activity  Goal: Patient/Family Education  Outcome: Resolved/Met

## 2021-10-01 NOTE — PROGRESS NOTES
END OF SHIFT NOTE:    Intake/Output  09/30 1901 - 10/01 0700  In: -   Out: 1300 [Urine:1300]   Voiding: YES  Catheter: NO    Drain:              Stool:  0 occurrences. Emesis:  0 occurrences. VITAL SIGNS  Patient Vitals for the past 12 hrs:   Temp Pulse Resp BP SpO2   10/01/21 0209 99 °F (37.2 °C) 80 17 (!) 158/88 96 %   09/30/21 2252 98.3 °F (36.8 °C) 81 18 (!) 145/75 96 %   09/30/21 2012 99 °F (37.2 °C) 81 20 (!) 144/78 97 %       Pain Assessment  Pain 1  Pain Scale 1: Visual (10/01/21 0209)  Pain Intensity 1: 0 (10/01/21 0209)  Patient Stated Pain Goal: 0 (10/01/21 0209)  Pain Location 1: Chest (09/29/21 1940)  Pain Orientation 1: Right; Anterior (09/29/21 1940)  Pain Description 1: Sharp (09/29/21 1940)  Pain Intervention(s) 1: Medication (see MAR) (09/29/21 1940)    Ambulating  No    Additional Information:   Improving A&O x4    Shift report given to oncoming nurse at the bedside.     Brett Kaplan RN

## 2021-10-01 NOTE — PROGRESS NOTES
Discharge orders given, IV removed, medications reviewed, follow up appts reviewed, daughter was in room, assisted with discharge, pt and daughters questions were answered to best ability.  Pt had no further questions or concerns, left unit via wheelchair

## 2021-10-01 NOTE — DISCHARGE SUMMARY
Hospitalist Discharge Summary   Admit Date:  2021 12:49 PM   DC Note date: 10/1/2021  Name:  Sabino Vail   Age:  80 y.o. Sex:  male  :  1950   MRN:  [id number]   Room:  [facility]  PCP:  Braxton Brewer NP    Presenting Complaint: Fall    Initial Admission Diagnosis: Hyponatremia [E87.1]  Hypokalemia [E87.6]     Problem List for this Hospitalization:  Hospital Problems as of 10/1/2021 Date Reviewed: 2021        Codes Class Noted - Resolved POA    Fatty liver ICD-10-CM: K76.0  ICD-9-CM: 571.8  2021 - Present Unknown        * (Principal) Acute hyponatremia ICD-10-CM: E87.1  ICD-9-CM: 276.1  2021 - Present Yes        Rib contusion ICD-10-CM: S20.219A  ICD-9-CM: 922.1  2021 - Present Yes        History of recent fall ICD-10-CM: Z91.81  ICD-9-CM: V15.88  2021 - Present Yes        Type 2 diabetes with nephropathy ([facility] ICD-10-CM: E11.21  ICD-9-CM: 250.40, 583.81  2018 - Present Yes        Pure hypercholesterolemia ICD-10-CM: E78.00  ICD-9-CM: 272.0  2016 - Present Yes        Essential hypertension, benign ICD-10-CM: I10  ICD-9-CM: 401.1  Unknown - Present Yes        RESOLVED: Acute metabolic encephalopathy ICK-76-DY: G93.41  ICD-9-CM: 348.31  2021 - 10/1/2021 Yes        RESOLVED: Hypokalemia ICD-10-CM: E87.6  ICD-9-CM: 276.8  2021 - 10/1/2021 Yes        RESOLVED: Leukocytosis ICD-10-CM: D72.829  ICD-9-CM: 288.60  2021 - 10/1/2021 Yes        RESOLVED: Volume depletion ICD-10-CM: E86.9  ICD-9-CM: 276.50  2021 - 10/1/2021 Yes        RESOLVED: Hyponatremia ICD-10-CM: E87.1  ICD-9-CM: 276.1  2021 - 10/1/2021 Yes            Did Patient have Sepsis (YES OR NO): No      Hospital Course:  Please see HPI and daily progress note for more details. Briefly, 32-year-old mal with significant past medical history of diabetes mellitus, hypertension and glaucoma and recently started on acetazolamide for glaucoma admitted with acute metabolic encephalopathy.   On presentation he was found to have several electrolyte abnormality including hyponatremia, hypokalemia, low chloride. Patient was thought to be dehydrated. His acetazolamide and hydrochlorothiazide was stopped. He was given IV fluid with improvement in his sodium. Sodium was increased slowly and his sodium is now stable between 1 31-1 34. His confusion has completely resolved when he is back to baseline. His daughter was at bedside. Ophthalmologist was contacted and they are okay holding his acetazolamide. His blood pressure staying good off hydrochlorothiazide. He has been discharged to home with instruction to follow-up with primary care within 1 week for lab work and further evaluation. I have discussed the discharge medication and instruction with patient and his daughter. Red flag signs discussed with the patient and his daughter. Also, during hospitalization, his creatinine went up which came down which was thought to be secondary to dehydration and was consistent with acute kidney injury. Also, patient had fall before hospitalization and complained of right-sided chest pain which has improved significantly. Pain is musculoskeletal in nature and chest x-ray negative for any fracture. Also renal ultrasound was done for MASTER and it shows fatty infiltration of liver. Discussed with patient and his daughter about changing lifestyle and following up with primary care for further evaluation and management. Disposition: Home or Self Care  Diet: ADULT DIET Regular; 4 carb choices (60 gm/meal)  Code Status: Full Code    Follow Up Orders: Follow-up Appointments   Procedures    FOLLOW UP VISIT Appointment in: Other (Specify) PCP and ophthalmologist in one week. PCP and ophthalmologist in one week. Standing Status:   Standing     Number of Occurrences:   1     Order Specific Question:   Appointment in     Answer:    Other (Specify)       Follow-up Information     Follow up With Specialties Details Why Contact Info    Karol Keene NP Family Medicine, Nurse Practitioner Schedule an appointment as soon as possible for a visit in 1 week  [address] [place] [address] [phone]            Time spent in patient discharge and coordination 34 minutes minutes. Plan was discussed with patient and daughter. All questions answered. Patient was stable at time of discharge. Instructions given to call a physician or return if any concerns. Discharge Info:   Current Discharge Medication List      START taking these medications    Details   lidocaine 4 % patch Intact skin only. On affected area. Qty: 30 Patch, Refills: 0  Start date: 10/1/2021         CONTINUE these medications which have NOT CHANGED    Details   amLODIPine (NORVASC) 10 mg tablet TAKE ONE TABLET BY MOUTH ONE TIME DAILY  Qty: 90 Tab, Refills: 3    Associated Diagnoses: Essential hypertension, benign      losartan (COZAAR) 100 mg tablet TAKE ONE TABLET BY MOUTH ONE TIME DAILY  Qty: 90 Tab, Refills: 3    Associated Diagnoses: Essential hypertension, benign      pravastatin (PRAVACHOL) 40 mg tablet TAKE ONE TABLET BY MOUTH ONE TIME DAILY  Qty: 90 Tab, Refills: 3    Associated Diagnoses: Pure hypercholesterolemia      meclizine (ANTIVERT) 25 mg tablet Take 1 Tab by mouth three (3) times daily as needed for Dizziness. Qty: 90 Tab, Refills: 3      pioglitazone (ACTOS) 30 mg tablet TAKE ONE TABLET BY MOUTH ONE TIME DAILY  Qty: 90 Tab, Refills: 3      DurezoL 0.05 % ophthalmic emulsion       glucose blood VI test strips (Accu-Chek Selma Plus test strp) strip Check blood glucose once daily e11.9  Qty: 100 Strip, Refills: 3      fluticasone propionate (FLONASE) 50 mcg/actuation nasal spray 2 Sprays by Both Nostrils route daily.   Qty: 3 Bottle, Refills: 3      !! FREESTYLE LANCETS 28 gauge misc TEST BLOOD SUGAR EVERY MORNING AND EVERY EVENING  Qty: 100 Lancet, Refills: 12      Blood-Glucose Meter (ACCU-CHEK SELMA PLUS METER) misc Check blood glucose once daily e11.9  Qty: 1 Each, Refills: 0      !! lancets 30 gauge misc See Admin Instructions. Use to test blood sugars twice daily. Dx: Type 2 diabetes mellitus without complication ([facility] [Y64.3]       ! ! - Potential duplicate medications found. Please discuss with provider. STOP taking these medications       acetaZOLAMIDE (DIAMOX) 250 mg tablet Comments:   Reason for Stopping:         hydroCHLOROthiazide (HYDRODIURIL) 25 mg tablet Comments:   Reason for Stopping:               Procedures done this admission:  * No surgery found *    Consults this admission:  None    Echocardiogram/EKG results:  No results found for this or any previous visit. EKG Results     None          Diagnostic Imaging/Tests:   XR CHEST PA LAT    Result Date: 9/30/2021  EXAM: CHEST X-RAY, 2 VIEWS INDICATION: Right lower rib pain COMPARISON: Chest 620 9/26/2021 TECHNIQUE: Frontal and lateral views of the chest were obtained. FINDINGS: Chronic-appearing reticular opacities in the bilateral lung bases. No definite consolidation. No pneumothorax or effusion. The cardiac silhouette is mildly enlarged. Multilevel degenerative changes of the spine. No radiographic evidence of acute cardiopulmonary disease. XR CHEST PA LAT    Result Date: 9/26/2021  EXAM: 2 view chest radiograph. INDICATION: Right-sided rib pain. COMPARISON: Chest radiograph dated March 18, 2019. FINDINGS: No focal lung consolidation. No pneumothorax. No pleural effusion. The heart is normal in size. No evidence of acute osseous abnormality. 1. No acute cardiopulmonary abnormality. CT HEAD WO CONT    Result Date: 9/26/2021  EXAM: CT head without contrast. INDICATION: Falls today. COMPARISON: Head CT dated December 17, 2018. Multiple axial images obtained through the brain without intravenous contrast. Radiation dose reduction techniques were used for this study:   All CT scans performed at this facility use one or all of the following: Automated exposure control, adjustment of the mA and/or kVp according to patient's size, iterative reconstruction. FINDINGS: Diffuse cerebral atrophy with commensurate ex vacuo dilatation of the ventricles. Scattered periventricular and centrum semiovale white matter hypointensities most indicative of chronic ischemic white matter change. No evidence of intracranial mass, hemorrhage, or large territorial infarct. The basal cisterns are patent. No extra-axial fluid collection or mass effect. The orbital contents are within normal limits. The paranasal sinuses are clear. The mastoid air cells and middle ears are clear. No significant osseous or extracranial soft tissue lesions. 1. No evidence of acute intracranial abnormality. Chronic changes as above. CT SPINE CERV WO CONT    Result Date: 9/26/2021  EXAM: CT of the cervical spine. INDICATION: Fall today. Upper right-sided back pain. COMPARISON: None. Multiple axial images were obtained through the cervical spine without intravenous contrast. Coronal and sagittal reformatted images were also reviewed. Radiation dose reduction techniques were used for this study: All CT scans performed at this facility use one or all of the following: Automated exposure control, adjustment of the mA and/or kVp according to patient's size, iterative reconstruction. 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 Diffuse osteopenia. Chronic ossification of the nuchal ligament. Moderate multilevel degenerative disc changes most advanced at C6-C7. No significant spondylolisthesis. No evidence of acute cervical spine fracture. No prevertebral soft tissue swelling. Small right occipital and left occipital scalp hematomas. 1. No evidence of acute cervical spine fracture. 2. Moderate multilevel degenerative disc changes.  3. 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 4. Small bilateral occipital scalp hematomas. US RETROPERITONEUM LTD    Result Date: 9/29/2021  RENAL ULTRASOUND INDICATION:  Acute kidney injury COMPARISON: None. TECHNIQUE: Real-time sonography of the kidneys, retroperitoneum and bladder was performed with multiple static images obtained. FINDINGS: The right kidney measures 10.9 cm and the left kidney measures 11.7 cm in length. The kidneys have a normal echotexture. There is no hydronephrosis. There are no significant renal masses. Partially imaged liver is echogenic. The urinary bladder is unremarkable. 1.  Unremarkable sonographic evaluation of the kidneys. No hydronephrosis. 2.  Partially imaged liver is diffusely echogenic consistent with steatosis and/or fibrosis. All Micro Results     None          Labs: Results:       BMP, Mg, Phos Recent Labs     10/01/21  1332 10/01/21  0542 09/30/21  1406 09/30/21  0529 09/30/21  0529 09/29/21  1456 09/29/21  1017   * 131* 132*   < > 134*  133*   < > 133*   K  --  4.3  --   --  3.7  --  4.1   CL  --  108*  --   --  111*  --  109*   CO2  --  19*  --   --  16*  --  15*   AGAP  --  4*  --   --  7  --  9   BUN  --  23  --   --  24*  --  29*   CREA  --  1.04  --   --  1.13  --  1.54*   CA  --  8.2*  --   --  8.0*  --  7.8*   GLU  --  132*  --   --  148*  --  209*    < > = values in this interval not displayed.       CBC Recent Labs     09/29/21  1017   WBC 12.4*   RBC 4.36   HGB 12.7*   HCT 36.6*      GRANS 77   LYMPH 9*   EOS 3   MONOS 11   BASOS 1   IG 0   ANEU 9.5*   ABL 1.1   YANNA 0.4   ABM 1.3   ABB 0.1   AIG 0.1      LFT Recent Labs     09/30/21  0529 09/29/21  1017   ALT 13 13   AP 48* 49*   TP 6.9 7.3   ALB 3.0* 3.1*   GLOB 3.9* 4.2*   AGRAT 0.8* 0.7*      Cardiac Testing No results found for: BNPP, BNP, CPK, RCK1, RCK2, RCK3, RCK4, CKMB, CKNDX, CKND1, TROPT, TROIQ   Coagulation Tests No results found for: PTP, INR, APTT, INREXT   A1c Lab Results   Component Value Date/Time    Hemoglobin A1c 7.5 (H) 07/13/2021 07:59 AM Hemoglobin A1c 8.0 (H) 03/22/2021 09:11 AM    Hemoglobin A1c 8.4 (H) 12/14/2020 08:47 AM    Hemoglobin A1c, External 7.1 06/08/2015 12:00 AM      Lipid Panel Lab Results   Component Value Date/Time    Cholesterol, total 162 07/13/2021 07:59 AM    HDL Cholesterol 41 07/13/2021 07:59 AM    LDL, calculated 92 07/13/2021 07:59 AM    LDL, calculated 69 01/17/2020 07:53 AM    VLDL, calculated 29 07/13/2021 07:59 AM    VLDL, calculated 41 (H) 01/17/2020 07:53 AM    Triglyceride 165 (H) 07/13/2021 07:59 AM      Thyroid Panel Lab Results   Component Value Date/Time    TSH 1.730 09/29/2021 10:17 AM    TSH 3.850 12/14/2020 08:47 AM    T4, Free 1.2 09/29/2021 10:17 AM        Most Recent UA Lab Results   Component Value Date/Time    Color YELLOW 09/26/2021 02:39 PM    Appearance CLEAR 09/26/2021 02:39 PM    Specific gravity 1.016 09/26/2021 02:39 PM    pH (UA) 5.5 09/26/2021 02:39 PM    Protein Negative 09/26/2021 02:39 PM    Glucose 250 09/26/2021 02:39 PM    Ketone 15 (A) 09/26/2021 02:39 PM    Bilirubin Negative 09/26/2021 02:39 PM    Blood Negative 09/26/2021 02:39 PM    Urobilinogen 0.2 09/26/2021 02:39 PM    Nitrites Negative 09/26/2021 02:39 PM    Leukocyte Esterase Negative 09/26/2021 02:39 PM          All Labs from Last 24 Hrs:  Recent Results (from the past 24 hour(s))   GLUCOSE, POC    Collection Time: 09/30/21  4:10 PM   Result Value Ref Range    Glucose (POC) 179 (H) 65 - 100 mg/dL    Performed by Katelynn Sosa    GLUCOSE, POC    Collection Time: 09/30/21  8:47 PM   Result Value Ref Range    Glucose (POC) 163 (H) 65 - 100 mg/dL    Performed by Alex    METABOLIC PANEL, BASIC    Collection Time: 10/01/21  5:42 AM   Result Value Ref Range    Sodium 131 (L) 136 - 145 mmol/L    Potassium 4.3 3.5 - 5.1 mmol/L    Chloride 108 (H) 98 - 107 mmol/L    CO2 19 (L) 21 - 32 mmol/L    Anion gap 4 (L) 7 - 16 mmol/L    Glucose 132 (H) 65 - 100 mg/dL    BUN 23 8 - 23 MG/DL    Creatinine 1.04 0.8 - 1.5 MG/DL    GFR est AA >60 >60 ml/min/1.73m2    GFR est non-AA >60 >60 ml/min/1.73m2    Calcium 8.2 (L) 8.3 - 10.4 MG/DL   GLUCOSE, POC    Collection Time: 10/01/21  7:22 AM   Result Value Ref Range    Glucose (POC) 130 (H) 65 - 100 mg/dL    Performed by LanierTimothyPCT    GLUCOSE, POC    Collection Time: 10/01/21 10:51 AM   Result Value Ref Range    Glucose (POC) 180 (H) 65 - 100 mg/dL    Performed by LanierTimothyPCT    SODIUM    Collection Time: 10/01/21  1:32 PM   Result Value Ref Range    Sodium 132 (L) 136 - 145 mmol/L       Current Med List in Hospital:   Current Facility-Administered Medications   Medication Dose Route Frequency    [START ON 10/2/2021] losartan (COZAAR) tablet 100 mg  100 mg Oral DAILY    senna-docusate (PERICOLACE) 8.6-50 mg per tablet 2 Tablet  2 Tablet Oral BID    polyethylene glycol (MIRALAX) packet 17 g  17 g Oral DAILY    guaiFENesin (ROBITUSSIN) 100 mg/5 mL oral liquid 100 mg  100 mg Oral Q4H PRN    lidocaine 4 % patch 1 Patch  1 Patch TransDERmal Q24H    labetaloL (NORMODYNE;TRANDATE) injection 10 mg  10 mg IntraVENous Q4H PRN    netarsudiL-latanoprost  (Rocklatan) 0.02-0.005 % drop  (Patient Supplied)  1 Drop Left Eye QPM    sodium chloride tablet 1 g  1 g Oral BID WITH MEALS    promethazine (PHENERGAN) with saline injection 12.5 mg  12.5 mg IntraVENous Q4H PRN    HYDROcodone-homatropine (HYCODAN) 5-1.5 mg/5 mL (5 mL) oral solution 5 mL  5 mL Oral Q4H PRN    sodium chloride (NS) flush 5-40 mL  5-40 mL IntraVENous Q8H    sodium chloride (NS) flush 5-40 mL  5-40 mL IntraVENous PRN    acetaminophen (TYLENOL) tablet 650 mg  650 mg Oral Q6H PRN    Or    acetaminophen (TYLENOL) suppository 650 mg  650 mg Rectal Q6H PRN    polyethylene glycol (MIRALAX) packet 17 g  17 g Oral DAILY PRN    ondansetron (ZOFRAN ODT) tablet 4 mg  4 mg Oral Q8H PRN    Or    ondansetron (ZOFRAN) injection 4 mg  4 mg IntraVENous Q6H PRN    insulin lispro (HUMALOG) injection   SubCUTAneous AC&HS    amLODIPine (NORVASC) tablet 10 mg  10 mg Oral DAILY    fluticasone propionate (FLONASE) 50 mcg/actuation nasal spray 2 Spray  2 Spray Both Nostrils DAILY    pravastatin (PRAVACHOL) tablet 40 mg  40 mg Oral DAILY       No Known Allergies  Immunization History   Administered Date(s) Administered    Covid-19, MODERNA, Mrna, Lnp-s, Pf, 100mcg/0.5mL 02/26/2021, 03/26/2021    Influenza Vaccine 09/30/2014, 01/12/2017    Influenza Vaccine (Quad) Mdck Pf (>4 Yrs Flucelvax QUAD [id number]) 11/03/2017    Influenza Vaccine (Quad) PF (>6 Mo Flulaval, Fluarix, and >3 Yrs Afluria, Fluzone [id number]) 10/29/2019    Influenza, Quadrivalent, Adjuvanted (>65 Yrs FLUAD QUAD [id number]) 10/23/2020    Pneumococcal Conjugate (PCV-13) 01/12/2017    Pneumococcal Polysaccharide (PPSV-23) 07/20/2021    Pneumococcal Vaccine (Unspecified Type) 09/30/2014    Tdap 03/13/2020    Zoster Recombinant 03/16/2020, 05/26/2020, 05/27/2020       Recent Vital Data:  Patient Vitals for the past 24 hrs:   Temp Pulse Resp BP SpO2   10/01/21 1108 98.3 °F (36.8 °C) 77 18 (!) 141/77 97 %   10/01/21 0752 98.7 °F (37.1 °C) 82 18 (!) 148/87 96 %   10/01/21 0209 99 °F (37.2 °C) 80 17 (!) 158/88 96 %   09/30/21 2252 98.3 °F (36.8 °C) 81 18 (!) 145/75 96 %   09/30/21 2012 99 °F (37.2 °C) 81 20 (!) 144/78 97 %   09/30/21 1611 98.4 °F (36.9 °C) 82 18 (!) 116/59 98 %     Oxygen Therapy  O2 Sat (%): 97 % (10/01/21 1108)  Pulse via Oximetry: 88 beats per minute (09/26/21 1729)  O2 Device: None (Room air) (10/01/21 0209)    Estimated body mass index is 26.47 kg/m² as calculated from the following:    Height as of this encounter: 5' 6\" (1.676 m). Weight as of this encounter: 74.4 kg (164 lb).     Intake/Output Summary (Last 24 hours) at 10/1/2021 1425  Last data filed at 10/1/2021 1119  Gross per 24 hour   Intake 118 ml   Output 1875 ml   Net -1757 ml         Physical Exam:  General: No acute distress, speaking in full sentences, no use of accessory muscles.  Patient speaks [place]i.    AOx3. HEENT: Left pupil slightly dilated than right which is chronic given his glaucoma. Next ocular muscle seems intact. Neck: Supple,   no JVD   Lungs: Clear to auscultation bilaterally   Cardiovascular: Regular rate and rhythm with normal S1 and S2   Abdomen: Soft, nontender, mildly distended, normoactive bowel sounds   Extremities: No cyanosis clubbing or edema. Neuro:  AOx3, moving all 4 extremities, speech normal.. Psych: Normal mood and affect     Signed:  Cherelle Quarles MD    Part of this note may have been written by using a voice dictation software. The note has been proof read but may still contain some grammatical/other typographical errors.

## 2021-10-06 PROBLEM — E11.9 DIABETES MELLITUS TREATED WITH ORAL MEDICATION (HCC): Status: ACTIVE | Noted: 2019-08-28

## 2021-10-06 PROBLEM — H25.9 AGE-RELATED CATARACT OF BOTH EYES: Status: ACTIVE | Noted: 2020-08-31

## 2021-10-06 PROBLEM — H04.123 DRY EYES: Status: ACTIVE | Noted: 2019-08-28

## 2021-10-06 PROBLEM — H04.123 INSUFFICIENCY OF TEAR FILM OF BOTH EYES: Status: ACTIVE | Noted: 2020-08-31

## 2021-10-06 PROBLEM — E11.3399 MODERATE NONPROLIFERATIVE DIABETIC RETINOPATHY ASSOCIATED WITH TYPE 2 DIABETES MELLITUS (HCC): Status: ACTIVE | Noted: 2020-08-31

## 2021-10-06 PROBLEM — Z79.84 DIABETES MELLITUS TREATED WITH ORAL MEDICATION (HCC): Status: ACTIVE | Noted: 2019-08-28

## 2022-03-18 PROBLEM — H04.123 INSUFFICIENCY OF TEAR FILM OF BOTH EYES: Status: ACTIVE | Noted: 2020-08-31

## 2022-03-18 PROBLEM — Z91.81 HISTORY OF RECENT FALL: Status: ACTIVE | Noted: 2021-09-26

## 2022-03-18 PROBLEM — Z79.84 DIABETES MELLITUS TREATED WITH ORAL MEDICATION (HCC): Status: ACTIVE | Noted: 2019-08-28

## 2022-03-18 PROBLEM — E11.9 DIABETES MELLITUS TREATED WITH ORAL MEDICATION (HCC): Status: ACTIVE | Noted: 2019-08-28

## 2022-03-19 PROBLEM — H81.10 BENIGN PAROXYSMAL POSITIONAL VERTIGO: Status: ACTIVE | Noted: 2017-11-03

## 2022-03-19 PROBLEM — E87.1 ACUTE HYPONATREMIA: Status: ACTIVE | Noted: 2021-09-26

## 2022-03-19 PROBLEM — H25.9 AGE-RELATED CATARACT OF BOTH EYES: Status: ACTIVE | Noted: 2020-08-31

## 2022-03-19 PROBLEM — E11.3399 MODERATE NONPROLIFERATIVE DIABETIC RETINOPATHY ASSOCIATED WITH TYPE 2 DIABETES MELLITUS (HCC): Status: ACTIVE | Noted: 2020-08-31

## 2022-03-19 PROBLEM — S20.219A RIB CONTUSION: Status: ACTIVE | Noted: 2021-09-26

## 2022-03-19 PROBLEM — K76.0 FATTY LIVER: Status: ACTIVE | Noted: 2021-09-30

## 2022-03-19 PROBLEM — E11.21 TYPE 2 DIABETES WITH NEPHROPATHY (HCC): Status: ACTIVE | Noted: 2018-04-04

## 2022-03-20 PROBLEM — H04.123 DRY EYES: Status: ACTIVE | Noted: 2019-08-28

## 2022-05-29 DIAGNOSIS — I10 ESSENTIAL HYPERTENSION, BENIGN: ICD-10-CM

## 2022-05-29 DIAGNOSIS — E11.21 TYPE 2 DIABETES WITH NEPHROPATHY (HCC): Primary | ICD-10-CM

## 2022-05-29 DIAGNOSIS — E55.9 VITAMIN D DEFICIENCY: ICD-10-CM

## 2022-05-29 DIAGNOSIS — E78.00 PURE HYPERCHOLESTEROLEMIA: ICD-10-CM

## 2022-07-16 ENCOUNTER — HOSPITAL ENCOUNTER (EMERGENCY)
Dept: GENERAL RADIOLOGY | Age: 72
Discharge: HOME | End: 2022-07-19
Payer: MEDICARE

## 2022-07-16 ENCOUNTER — HOSPITAL ENCOUNTER (EMERGENCY)
Age: 72
Discharge: HOME | End: 2022-07-16
Attending: EMERGENCY MEDICINE | Admitting: EMERGENCY MEDICINE
Payer: MEDICARE

## 2022-07-16 VITALS
DIASTOLIC BLOOD PRESSURE: 77 MMHG | RESPIRATION RATE: 16 BRPM | SYSTOLIC BLOOD PRESSURE: 167 MMHG | BODY MASS INDEX: 27.47 KG/M2 | WEIGHT: 175 LBS | HEART RATE: 76 BPM | OXYGEN SATURATION: 96 % | TEMPERATURE: 98.7 F | HEIGHT: 67 IN
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
## 2022-07-16 DIAGNOSIS — R07.81 RIB PAIN ON LEFT SIDE: ICD-10-CM

## 2022-07-16 PROCEDURE — 71101 X-RAY EXAM UNILAT RIBS/CHEST: CPT

## 2022-07-16 PROCEDURE — 99283 EMERGENCY DEPT VISIT LOW MDM: CPT

## 2022-07-16 ASSESSMENT — ENCOUNTER SYMPTOMS
NAUSEA: 0
SHORTNESS OF BREATH: 0
ABDOMINAL PAIN: 0
BACK PAIN: 0
DIARRHEA: 0
EYES NEGATIVE: 1

## 2022-07-16 ASSESSMENT — PAIN DESCRIPTION - LOCATION: LOCATION: RIB CAGE

## 2022-07-16 ASSESSMENT — PAIN SCALES - GENERAL: PAINLEVEL_OUTOF10: 2

## 2022-07-16 ASSESSMENT — PAIN - FUNCTIONAL ASSESSMENT: PAIN_FUNCTIONAL_ASSESSMENT: 0-10
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
## 2022-07-16 NOTE — DISCHARGE INSTRUCTIONS
You will continue to experience muscular soreness for the next 2-3 days. Stretch, deep tissue massage, heating pad, hot showers. For pain control, take Tylenol 1000mg every 6 hours. Return to ED for new or worsening symptoms such as severe headache, vision changes, trouble walking, chest pain or shortness of breath.
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
## 2022-07-16 NOTE — ED NOTES
I have reviewed discharge instructions with the patient and daughter. The patient and daughter verbalized understanding. Patient left ED via Discharge Method: ambulatory to Home with self and daughter. Opportunity for questions and clarification provided. Patient given 0 scripts. To continue your aftercare when you leave the hospital, you may receive an automated call from our care team to check in on how you are doing. This is a free service and part of our promise to provide the best care and service to meet your aftercare needs.  If you have questions, or wish to unsubscribe from this service please call [phone]. Thank you for Choosing our [facility] Emergency Department.         Torsten Nicole RN  07/16/22 [id number]
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
## 2022-08-23 DIAGNOSIS — E11.65 TYPE 2 DIABETES MELLITUS WITH HYPERGLYCEMIA, WITHOUT LONG-TERM CURRENT USE OF INSULIN (HCC): Primary | ICD-10-CM

## 2022-08-23 RX ORDER — BLOOD SUGAR DIAGNOSTIC
STRIP MISCELLANEOUS
COMMUNITY
Start: 2022-05-20 | End: 2022-08-23 | Stop reason: SDUPTHER

## 2022-08-23 RX ORDER — BLOOD SUGAR DIAGNOSTIC
1 STRIP MISCELLANEOUS 2 TIMES DAILY
Qty: 100 EACH | Refills: 11 | Status: SHIPPED | OUTPATIENT
Start: 2022-08-23

## 2022-09-01 ENCOUNTER — TELEPHONE (OUTPATIENT)
Dept: PHARMACY | Facility: CLINIC | Age: 72
End: 2022-09-01

## 2022-09-01 NOTE — TELEPHONE ENCOUNTER
supply    Lab Results   Component Value Date    LABA1C 7.2 (H) 02/21/2022    LABA1C 7.3 (H) 10/12/2021    LABA1C 7.5 (H) 07/13/2021     NOTE A1c <9%    STATIN ADHERENCE    Insurance Records claims through 8/19/22 (Prior Year PDC =  100%; YTD PDC =  100%; Passed in 2022):   Pravastatin 40 mg tab last filled on 7/21/22 for 90 day supply. Next refill due: 11/2/22    Per Reconciled Dispense Report:  PRAVASTATIN SODIUM  40 MG TABS 07/21/2022 90 90 tablet JESUS,STU [id number] [place]. .. PRAVASTATIN SODIUM  40 MG TABS 05/06/2022 90 90 tablet JESUS,STU [id number] [place]. .. PRAVASTATIN SODIUM  40 MG TABS 02/05/2022 90 90 tablet JESUS,STU [id number] [place]. .. PRAVASTATIN 40 MG TAB 11/11/2021  90 Device  [id number] [place]. .. PRAVASTATIN 40 MG TAB 08/19/2021  90 Device  [id number] [place]. .. Lab Results   Component Value Date    CHOL 151 02/21/2022    TRIG 111 02/21/2022    HDL 43 02/21/2022    LDLCALC 88 02/21/2022     ALT   Date Value Ref Range Status   02/21/2022 9 0 - 44 IU/L Final     AST   Date Value Ref Range Status   02/21/2022 18 0 - 40 IU/L Final     The 10-year ASCVD risk score (Soheila Soriano, et al., 2013) is: 49%    Values used to calculate the score:      Age: 80 years      Sex: Male      Is Non- : No      Diabetic: Yes      Tobacco smoker: No      Systolic Blood Pressure: 018 mmHg      Is BP treated: Yes      HDL Cholesterol: 43 mg/dL      Total Cholesterol: 151 mg/dL     PLAN  The following are interventions that have been identified:   - Patient overdue refilling Farxiga & pioglitazone and active on home medication list.   Lalitha Godoy Rx from [facility] nephrology, using for CKD; believe refill on file  Pioglitazone was getting filled regularly; believe refill on file    Attempted to use  services to reach pt but services unavailable at time of call.     Will send MyChart message as appears has used in the past (English)    Again tried use use  services 9/7/22 but services unavailable at time of call. Unable to reach pharmacy.     Future Appointments   Date Time Provider Jorge A Joyneri   10/24/2022 11:00 AM Watt Plaza, APRN - CNP PST [facility]   10/31/2022 10:30 AM INO Herrera CNP [facility] IRMA Gomez, PharmD, [facility]  Department, toll free: [phone] option 2    For Pharmacy Admin Tracking Only    Time Spent (min): 15

## 2022-10-24 ENCOUNTER — NURSE ONLY (OUTPATIENT)
Dept: FAMILY MEDICINE CLINIC | Facility: CLINIC | Age: 72
End: 2022-10-24

## 2022-10-24 DIAGNOSIS — E55.9 VITAMIN D DEFICIENCY: ICD-10-CM

## 2022-10-24 DIAGNOSIS — E78.00 PURE HYPERCHOLESTEROLEMIA: ICD-10-CM

## 2022-10-24 DIAGNOSIS — I10 ESSENTIAL HYPERTENSION, BENIGN: ICD-10-CM

## 2022-10-24 DIAGNOSIS — E11.21 TYPE 2 DIABETES WITH NEPHROPATHY (HCC): ICD-10-CM

## 2022-10-24 DIAGNOSIS — E11.21 TYPE 2 DIABETES WITH NEPHROPATHY (HCC): Primary | ICD-10-CM

## 2022-10-25 LAB
25(OH)D3 SERPL-MCNC: 49.6 NG/ML (ref 30–100)
ALBUMIN SERPL-MCNC: 3.8 G/DL (ref 3.2–4.6)
ALBUMIN/GLOB SERPL: 0.9 {RATIO} (ref 0.4–1.6)
ALP SERPL-CCNC: 63 U/L (ref 50–136)
ALT SERPL-CCNC: 18 U/L (ref 12–65)
ANION GAP SERPL CALC-SCNC: 4 MMOL/L (ref 2–11)
AST SERPL-CCNC: 17 U/L (ref 15–37)
BASOPHILS # BLD: 0.1 K/UL (ref 0–0.2)
BASOPHILS NFR BLD: 1 % (ref 0–2)
BILIRUB SERPL-MCNC: 0.5 MG/DL (ref 0.2–1.1)
BUN SERPL-MCNC: 22 MG/DL (ref 8–23)
CALCIUM SERPL-MCNC: 8.6 MG/DL (ref 8.3–10.4)
CHLORIDE SERPL-SCNC: 110 MMOL/L (ref 101–110)
CHOLEST SERPL-MCNC: 103 MG/DL
CO2 SERPL-SCNC: 24 MMOL/L (ref 21–32)
CREAT SERPL-MCNC: 1.4 MG/DL (ref 0.8–1.5)
CREAT UR-MCNC: 88 MG/DL
DIFFERENTIAL METHOD BLD: ABNORMAL
EOSINOPHIL # BLD: 1 K/UL (ref 0–0.8)
EOSINOPHIL NFR BLD: 11 % (ref 0.5–7.8)
ERYTHROCYTE [DISTWIDTH] IN BLOOD BY AUTOMATED COUNT: 13 % (ref 11.9–14.6)
EST. AVERAGE GLUCOSE BLD GHB EST-MCNC: 171 MG/DL
GLOBULIN SER CALC-MCNC: 4.1 G/DL (ref 2.8–4.5)
GLUCOSE SERPL-MCNC: 138 MG/DL (ref 65–100)
HBA1C MFR BLD: 7.6 % (ref 4.8–5.6)
HCT VFR BLD AUTO: 45.7 % (ref 41.1–50.3)
HDLC SERPL-MCNC: 33 MG/DL (ref 40–60)
HDLC SERPL: 3.1 {RATIO}
HGB BLD-MCNC: 14.6 G/DL (ref 13.6–17.2)
IMM GRANULOCYTES # BLD AUTO: 0 K/UL (ref 0–0.5)
IMM GRANULOCYTES NFR BLD AUTO: 0 % (ref 0–5)
LDLC SERPL CALC-MCNC: 47.2 MG/DL
LYMPHOCYTES # BLD: 2.2 K/UL (ref 0.5–4.6)
LYMPHOCYTES NFR BLD: 25 % (ref 13–44)
MCH RBC QN AUTO: 28.7 PG (ref 26.1–32.9)
MCHC RBC AUTO-ENTMCNC: 31.9 G/DL (ref 31.4–35)
MCV RBC AUTO: 90 FL (ref 82–102)
MICROALBUMIN UR-MCNC: 6.27 MG/DL
MICROALBUMIN/CREAT UR-RTO: 71 MG/G
MONOCYTES # BLD: 0.9 K/UL (ref 0.1–1.3)
MONOCYTES NFR BLD: 10 % (ref 4–12)
NEUTS SEG # BLD: 4.5 K/UL (ref 1.7–8.2)
NEUTS SEG NFR BLD: 53 % (ref 43–78)
NRBC # BLD: 0 K/UL (ref 0–0.2)
PLATELET # BLD AUTO: 302 K/UL (ref 150–450)
PMV BLD AUTO: 10.1 FL (ref 9.4–12.3)
POTASSIUM SERPL-SCNC: 4.2 MMOL/L (ref 3.5–5.1)
PROT SERPL-MCNC: 7.9 G/DL (ref 6.3–8.2)
RBC # BLD AUTO: 5.08 M/UL (ref 4.23–5.6)
SODIUM SERPL-SCNC: 138 MMOL/L (ref 133–143)
TRIGL SERPL-MCNC: 114 MG/DL (ref 35–150)
TSH W FREE THYROID IF ABNORMAL: 2.33 UIU/ML (ref 0.36–3.74)
VLDLC SERPL CALC-MCNC: 22.8 MG/DL (ref 6–23)
WBC # BLD AUTO: 8.6 K/UL (ref 4.3–11.1)

## 2022-10-30 PROBLEM — N18.31 STAGE 3A CHRONIC KIDNEY DISEASE (CKD) (HCC): Status: ACTIVE | Noted: 2022-05-23

## 2022-10-30 PROBLEM — I10 BENIGN ESSENTIAL HYPERTENSION: Status: ACTIVE | Noted: 2022-05-23

## 2022-10-30 RX ORDER — DAPAGLIFLOZIN 10 MG/1
TABLET, FILM COATED ORAL
COMMUNITY
Start: 2022-09-27 | End: 2022-10-31 | Stop reason: SDUPTHER

## 2022-10-30 RX ORDER — BRIMONIDINE TARTRATE AND TIMOLOL MALEATE 2; 5 MG/ML; MG/ML
SOLUTION OPHTHALMIC
COMMUNITY
Start: 2022-07-30

## 2022-10-30 ASSESSMENT — ENCOUNTER SYMPTOMS
PHOTOPHOBIA: 0
VOICE CHANGE: 0
BLOOD IN STOOL: 0
CHOKING: 0
CHEST TIGHTNESS: 0
SHORTNESS OF BREATH: 0
ABDOMINAL DISTENTION: 0
CONSTIPATION: 0
STRIDOR: 0
ABDOMINAL PAIN: 0
ALLERGIC/IMMUNOLOGIC NEGATIVE: 1
ANAL BLEEDING: 0
TROUBLE SWALLOWING: 0
EYE PAIN: 0
RHINORRHEA: 0
GASTROINTESTINAL NEGATIVE: 1
COLOR CHANGE: 0
RECTAL PAIN: 0
FACIAL SWELLING: 0
APNEA: 0
VOMITING: 0
WHEEZING: 0
NAUSEA: 0
EYE ITCHING: 0
DIARRHEA: 0
EYE REDNESS: 0
EYES NEGATIVE: 1
EYE DISCHARGE: 0
SORE THROAT: 0
BACK PAIN: 0

## 2022-10-30 NOTE — PROGRESS NOTES
PROGRESS NOTE    Chief Complaint   Patient presents with    3 Month Follow-Up     Pt presenting for 3 month lab follow up from labs drawn on 10/24. Pt prescribed vit D, pravastatin, farxiga, actos, atenolol, losartan, and     Flu Vaccine     Pt due for flu vaccine       SUBJECTIVE:     Sobia Ram is a very pleasant 71 y.o. male with hx of diabetes, hypertension, hyperlipidemia and CKD-currently following nephrology, seen today in office for lab results review and med refills. Patient report he was recently started on [place] but is having some financial coverage logistics for medication due to cost.  He did also ran out of pioglitazone recently. He reports doing well overall aside for having increasing in sinus pressure and congestion with coughing and productive sputum intermittently for the last 3 weeks. Pertinent negatives include no fever or chills, no chest pain, no shortness of breath no palpitation, no unilateral focal weakness, no abdominal pain, no nausea, no vomiting, no diarrhea, no changes in no incontinence of bladder or bowel function. Past Medical History, Past Surgical History, Family history, Social History, and Medications were all reviewed with the patient today and updated as necessary.        Current Outpatient Medications   Medication Sig Dispense Refill    atenolol (TENORMIN) 25 MG tablet Take 1 tablet by mouth daily For blood pressure 90 tablet 3    losartan (COZAAR) 100 MG tablet TAKE ONE TABLET BY MOUTH ONE TIME DAILY FOR BLOOD PRESSURE 90 tablet 3    meclizine (ANTIVERT) 25 MG tablet Take 1 tablet by mouth 3 times daily as needed for Dizziness 90 tablet 11    pioglitazone (ACTOS) 30 MG tablet TAKE ONE TABLET BY MOUTH ONE TIME DAILY FOR SUGAR 90 tablet 3    pravastatin (PRAVACHOL) 40 MG tablet TAKE ONE TABLET BY MOUTH ONE TIME DAILY FOR CHOLESTEROL 90 tablet 3    spironolactone (ALDACTONE) 25 MG tablet Take 1 tablet by mouth daily as needed (swelling) 90 tablet 3 dapagliflozin (FARXIGA) 10 MG tablet Take 1 tablet by mouth every morning For sugar and kidneys 90 tablet 3    doxycycline monohydrate (ADOXA) 100 MG tablet Take 1 tablet by mouth 2 times daily for 10 days (Antibiotic) 20 tablet 0    brimonidine-timolol (COMBIGAN) 0.2-0.5 % ophthalmic solution       ACCU-CHEK JENNIFER PLUS strip Inject 1 each into the skin 2 times daily 100 each 11    vitamin D3 (CHOLECALCIFEROL) 125 MCG (5000 UT) TABS tablet Take 5,000 Units by mouth      fluticasone (FLONASE) 50 MCG/ACT nasal spray 2 sprays by Nasal route daily      lidocaine 4 % external patch Intact skin only. On affected area. No current facility-administered medications for this visit.      No Known Allergies  Patient Active Problem List   Diagnosis    Diabetes mellitus treated with oral medication ([address]    Insufficiency of tear film of both eyes    History of recent fall    Fatty liver    Moderate nonproliferative diabetic retinopathy associated with type 2 diabetes mellitus (HCC)    Type 2 diabetes with nephropathy (HCC)    Acute hyponatremia    Benign paroxysmal positional vertigo    Age-related cataract of both eyes    Rib contusion    Benign essential hypertension    Dry eyes    Pure hypercholesterolemia    Nuclear cataract    Stage 3a chronic kidney disease (CKD) ([address]     Past Medical History:   Diagnosis Date    Essential hypertension, benign     H/O appendicitis     Hyperlipidemia     Type 2 diabetes mellitus without complication ([address]     normal 90, checks once a day     Wears dentures      Past Surgical History:   Procedure Laterality Date    APPENDECTOMY  1986    COLONOSCOPY N/A 5/22/2017    COLONOSCOPY / BMI / 27 performed by Danisha Greenberg MD at [facility]COPY    COLONOSCOPY FLX DX W/COLLJ SPEC WHEN PFRMD  5/22/2017          Family History   Problem Relation Age of Onset    Diabetes Sister         type II    Diabetes Brother         type II     Social History     Tobacco Use    Smoking status: Never Smokeless tobacco: Never   Substance Use Topics    Alcohol use: Yes     Alcohol/week: 0.0 standard drinks         REVIEW OF SYSTEM    Review of Systems   Constitutional: Negative. Negative for activity change, appetite change, chills, diaphoresis, fatigue, fever and unexpected weight change. HENT:  Positive for congestion, postnasal drip, sinus pressure and sinus pain. Negative for dental problem, drooling, ear discharge, ear pain, facial swelling, hearing loss, mouth sores, nosebleeds, rhinorrhea, sneezing, sore throat, tinnitus, trouble swallowing and voice change. Eyes: Negative. Negative for photophobia, pain, discharge, redness, itching and visual disturbance. Respiratory:  Positive for cough. Negative for apnea, choking, chest tightness, shortness of breath, wheezing and stridor. Cardiovascular: Negative. Negative for chest pain, palpitations and leg swelling. Gastrointestinal: Negative. Negative for abdominal distention, abdominal pain, anal bleeding, blood in stool, constipation, diarrhea, nausea, rectal pain and vomiting. Endocrine: Negative. Negative for cold intolerance, heat intolerance, polydipsia, polyphagia and polyuria. Genitourinary: Negative. Negative for decreased urine volume, difficulty urinating, dysuria, enuresis, flank pain, frequency, genital sores, hematuria and urgency. Musculoskeletal: Negative. Negative for arthralgias, back pain, gait problem, joint swelling, myalgias, neck pain and neck stiffness. Skin: Negative. Negative for color change, pallor, rash and wound. Allergic/Immunologic: Negative. Negative for environmental allergies, food allergies and immunocompromised state. Neurological: Negative. Negative for dizziness, tremors, seizures, syncope, facial asymmetry, speech difficulty, weakness, light-headedness, numbness and headaches. Hematological: Negative. Negative for adenopathy. Does not bruise/bleed easily.    Psychiatric/Behavioral: Negative. Negative for agitation, behavioral problems, confusion, decreased concentration, dysphoric mood, hallucinations, self-injury, sleep disturbance and suicidal ideas. The patient is not nervous/anxious and is not hyperactive. OBJECTIVE:  /88 (Site: Left Upper Arm, Position: Sitting)   Pulse 61   Wt 167 lb (75.8 kg)   SpO2 98%   BMI 26.16 kg/m²      Physical Exam  Vitals and nursing note reviewed. Constitutional:       General: He is not in acute distress. Appearance: Normal appearance. He is not ill-appearing, toxic-appearing or diaphoretic. HENT:      Head: Normocephalic and atraumatic. Right Ear: Tympanic membrane, ear canal and external ear normal. There is no impacted cerumen. Left Ear: Tympanic membrane, ear canal and external ear normal. There is no impacted cerumen. Nose: Nose normal. No congestion or rhinorrhea. Mouth/Throat:      Mouth: Mucous membranes are moist.      Pharynx: Oropharynx is clear. Eyes:      General: No scleral icterus. Right eye: No discharge. Left eye: No discharge. Extraocular Movements: Extraocular movements intact. Conjunctiva/sclera: Conjunctivae normal.      Pupils: Pupils are equal, round, and reactive to light. Neck:      Vascular: No carotid bruit. Cardiovascular:      Rate and Rhythm: Normal rate and regular rhythm. Pulses: Normal pulses. Heart sounds: Normal heart sounds. No murmur heard. No friction rub. No gallop. Pulmonary:      Effort: Pulmonary effort is normal. No respiratory distress. Breath sounds: Normal breath sounds. No stridor. No wheezing, rhonchi or rales. Chest:      Chest wall: No tenderness. Abdominal:      General: Abdomen is flat. Bowel sounds are normal. There is no distension. Palpations: Abdomen is soft. There is no mass. Tenderness: There is no abdominal tenderness. There is no right CVA tenderness, left CVA tenderness, guarding or rebound. Negative signs include Baptiste's sign, Rovsing's sign, McBurney's sign, psoas sign and obturator sign. Hernia: No hernia is present. Musculoskeletal:         General: Normal range of motion. Cervical back: Normal range of motion and neck supple. No rigidity or tenderness. Right lower leg: No edema. Left lower leg: No edema. Lymphadenopathy:      Cervical: No cervical adenopathy. Skin:     General: Skin is warm and dry. Capillary Refill: Capillary refill takes less than 2 seconds. Findings: No rash. Neurological:      General: No focal deficit present. Mental Status: He is alert and oriented to person, place, and time. Psychiatric:         Mood and Affect: Mood normal.         Behavior: Behavior normal.         Thought Content: Thought content normal.         Judgment: Judgment normal.         Medical problems and test results were reviewed with the patient today.    Lab Results   Component Value Date     10/24/2022    K 4.2 10/24/2022     10/24/2022    CO2 24 10/24/2022    BUN 22 10/24/2022    CREATININE 1.40 10/24/2022    GLUCOSE 138 (H) 10/24/2022    CALCIUM 8.6 10/24/2022    PROT 7.9 10/24/2022    LABALBU 3.8 10/24/2022    BILITOT 0.5 10/24/2022    ALKPHOS 63 10/24/2022    AST 17 10/24/2022    ALT 18 10/24/2022    LABGLOM 54 (L) 10/24/2022    GFRAA 51 (L) 02/21/2022    AGRATIO 1.5 02/21/2022    GLOB 4.1 10/24/2022       Lab Results   Component Value Date    WBC 8.6 10/24/2022    HGB 14.6 10/24/2022    HCT 45.7 10/24/2022    MCV 90.0 10/24/2022     10/24/2022     Lab Results   Component Value Date    CHOL 103 10/24/2022    CHOL 151 02/21/2022    CHOL 125 10/12/2021     Lab Results   Component Value Date    TRIG 114 10/24/2022    TRIG 111 02/21/2022    TRIG 109 10/12/2021     Lab Results   Component Value Date    HDL 33 (L) 10/24/2022    HDL 43 02/21/2022    HDL 41 10/12/2021     Lab Results   Component Value Date    LDLCALC 47.2 10/24/2022    LDLCALC 88 02/21/2022    LDLCALC 64 10/12/2021     Lab Results   Component Value Date    LABVLDL 22.8 10/24/2022    LABVLDL 41 (H) 01/17/2020    LABVLDL 42 (H) 07/10/2019    VLDL 20 02/21/2022    VLDL 20 10/12/2021    VLDL 29 07/13/2021     Lab Results   Component Value Date    CHOLHDLRATIO 3.1 10/24/2022     Hemoglobin A1C   Date Value Ref Range Status   10/24/2022 7.6 (H) 4.8 - 5.6 % Final     Lab Results   Component Value Date    TSH 4.220 02/21/2022     Lab Results   Component Value Date/Time    VITD25 49.6 10/24/2022 09:43 AM      Lab Results   Component Value Date    PSA 1.8 02/21/2022    PSA 1.6 12/14/2020         ASSESSMENT and PLAN    1. Type 2 diabetes with nephropathy (HCC)  -     pioglitazone (ACTOS) 30 MG tablet; TAKE ONE TABLET BY MOUTH ONE TIME DAILY FOR SUGAR, Disp-90 tablet, R-3Normal  -     dapagliflozin (FARXIGA) 10 MG tablet; Take 1 tablet by mouth every morning For sugar and kidneys, Disp-90 tablet, R-3Normal  -     Comprehensive Metabolic Panel; Future  -     Hemoglobin A1C; Future  -     CBC with Auto Differential; Future    A1c is 7.6. Patient reports he has had some issues with coverage for Farxiga 10 mg daily as well as running out of pioglitazone. We will have patient continue current therapy at this time. Samples provided for Gil Callahan today. Repeat labs in 3-4 months. Of note, patient has seen [facility] recently. Medical release form obtained for record update. 2. Hyperlipidemia LDL goal <70  -     pravastatin (PRAVACHOL) 40 MG tablet; TAKE ONE TABLET BY MOUTH ONE TIME DAILY FOR CHOLESTEROL, Disp-90 tablet, R-3Normal  -     Lipid Panel; Future  -     Comprehensive Metabolic Panel; Future    LDL is 47. At goal of 70 and under for patient with diabetes. Patient to continue current therapy. Repeat fasting labs in 3 to 4 months. 3. Essential hypertension, benign  -     atenolol (TENORMIN) 25 MG tablet;  Take 1 tablet by mouth daily For blood pressure, Disp-90 tablet, R-3Normal  - losartan (COZAAR) 100 MG tablet; TAKE ONE TABLET BY MOUTH ONE TIME DAILY FOR BLOOD PRESSURE, Disp-90 tablet, R-3Normal  -     spironolactone (ALDACTONE) 25 MG tablet; Take 1 tablet by mouth daily as needed (swelling), Disp-90 tablet, R-3Normal  -     Comprehensive Metabolic Panel; Future    Blood pressure today in office is 128/88. Stable. Continue current therapy. 4. Stage 3 chronic kidney disease, unspecified whether stage 3a or 3b CKD (HCC)  -     dapagliflozin (FARXIGA) 10 MG tablet; Take 1 tablet by mouth every morning For sugar and kidneys, Disp-90 tablet, R-3Normal  -     Comprehensive Metabolic Panel; Future  -     CBC with Auto Differential; Future    Patient is established with nephrology. He has an appointment in November for follow-up. Patient to keep follow-up appointment with nephrology specialist for medication monitoring and treatment recommendation. 5. Vitamin D deficiency, unspecified  -     Vitamin D 25 Hydroxy; Future    Normal.  Continue current therapy. 6. Benign paroxysmal vertigo, unspecified laterality  -     meclizine (ANTIVERT) 25 MG tablet; Take 1 tablet by mouth 3 times daily as needed for Dizziness, Disp-90 tablet, R-11Normal    Stable. No recent exacerbation. 7. Special screening for malignant neoplasm of prostate  -     PSA Screening; Future    8. Acute bacterial sinusitis  -     doxycycline monohydrate (ADOXA) 100 MG tablet; Take 1 tablet by mouth 2 times daily for 10 days (Antibiotic), Disp-20 tablet, R-0Normal    Patient has had intermittent coughing and congestion for last 3 weeks. We will proceed to treat for acute bacterial sinusitis. Start doxycycline twice daily. Reviewed risk and side effects of medication including GI upset and increased risk for opportunistic infection such as yeast and C. difficile.   Patient was advised to take medication with food and to increase probiotic supplementation (via yogurt, cottage cheese, cultured foods/drinks or OTC probiotic supplements) to avoid any opportunistic infection. Advised patient let us know if symptoms do not improve or resolve. Is to go to the ED if he has any chest pain or shortness of breath. 9. Encounter for immunization  -     Influenza, FLUAD, (age 72 y+), IM, PF, 0.5 mL    Patient is due for annual flu vaccine. Discussed current CDC recommendation for immunization. Pt is amenable to receiving vaccine today in office. VIS provided. Vaccine administered. Orders Placed This Encounter   Procedures    Influenza, FLUAD, (age 72 y+), IM, PF, 0.5 mL    Vitamin D 25 Hydroxy     Standing Status:   Future     Standing Expiration Date:   10/30/2023    Lipid Panel     Standing Status:   Future     Standing Expiration Date:   10/30/2023    Comprehensive Metabolic Panel     Standing Status:   Future     Standing Expiration Date:   10/30/2023    Hemoglobin A1C     Standing Status:   Future     Standing Expiration Date:   10/30/2023    CBC with Auto Differential     Standing Status:   Future     Standing Expiration Date:   10/30/2023    PSA Screening     Standing Status:   Future     Standing Expiration Date:   10/30/2023         Elements of this note have been dictated using speech recognition software. As a result, errors of speech recognition may have occurred. On this date 10/31/2022 I have spent 30 minutes reviewing previous notes, test results and face to face with the patient discussing the diagnosis and importance of compliance with the treatment plan as well as documenting on the day of the visit. Greater than 50% of this visit was spent counseling the patient about test results, prognosis, importance of compliance, education about disease process, benefits of medications, instructions for management of acute symptoms, and follow up plans. Return in about 17 weeks (around 2/27/2023) for AWV with fasting labs prior (A1C, cmp, cbc, vitd, lipid, PSA, tsh).      Mecca Corrigan, APRN - CNP

## 2022-10-31 ENCOUNTER — OFFICE VISIT (OUTPATIENT)
Dept: FAMILY MEDICINE CLINIC | Facility: CLINIC | Age: 72
End: 2022-10-31
Payer: MEDICARE

## 2022-10-31 VITALS
BODY MASS INDEX: 26.16 KG/M2 | HEART RATE: 61 BPM | OXYGEN SATURATION: 98 % | WEIGHT: 167 LBS | SYSTOLIC BLOOD PRESSURE: 128 MMHG | DIASTOLIC BLOOD PRESSURE: 88 MMHG

## 2022-10-31 DIAGNOSIS — E55.9 VITAMIN D DEFICIENCY, UNSPECIFIED: ICD-10-CM

## 2022-10-31 DIAGNOSIS — Z12.5 SPECIAL SCREENING FOR MALIGNANT NEOPLASM OF PROSTATE: ICD-10-CM

## 2022-10-31 DIAGNOSIS — E11.21 TYPE 2 DIABETES WITH NEPHROPATHY (HCC): Primary | ICD-10-CM

## 2022-10-31 DIAGNOSIS — N18.30 STAGE 3 CHRONIC KIDNEY DISEASE, UNSPECIFIED WHETHER STAGE 3A OR 3B CKD (HCC): ICD-10-CM

## 2022-10-31 DIAGNOSIS — B96.89 ACUTE BACTERIAL SINUSITIS: ICD-10-CM

## 2022-10-31 DIAGNOSIS — J01.90 ACUTE BACTERIAL SINUSITIS: ICD-10-CM

## 2022-10-31 DIAGNOSIS — Z23 ENCOUNTER FOR IMMUNIZATION: ICD-10-CM

## 2022-10-31 DIAGNOSIS — E78.5 HYPERLIPIDEMIA LDL GOAL <70: ICD-10-CM

## 2022-10-31 DIAGNOSIS — I10 ESSENTIAL HYPERTENSION, BENIGN: ICD-10-CM

## 2022-10-31 DIAGNOSIS — H81.10 BENIGN PAROXYSMAL VERTIGO, UNSPECIFIED LATERALITY: ICD-10-CM

## 2022-10-31 PROCEDURE — 3074F SYST BP LT 130 MM HG: CPT | Performed by: NURSE PRACTITIONER

## 2022-10-31 PROCEDURE — 90694 VACC AIIV4 NO PRSRV 0.5ML IM: CPT | Performed by: NURSE PRACTITIONER

## 2022-10-31 PROCEDURE — 3078F DIAST BP <80 MM HG: CPT | Performed by: NURSE PRACTITIONER

## 2022-10-31 PROCEDURE — 99214 OFFICE O/P EST MOD 30 MIN: CPT | Performed by: NURSE PRACTITIONER

## 2022-10-31 PROCEDURE — 3051F HG A1C>EQUAL 7.0%<8.0%: CPT | Performed by: NURSE PRACTITIONER

## 2022-10-31 PROCEDURE — G0008 ADMIN INFLUENZA VIRUS VAC: HCPCS | Performed by: NURSE PRACTITIONER

## 2022-10-31 PROCEDURE — 1123F ACP DISCUSS/DSCN MKR DOCD: CPT | Performed by: NURSE PRACTITIONER

## 2022-10-31 RX ORDER — MECLIZINE HYDROCHLORIDE 25 MG/1
25 TABLET ORAL 3 TIMES DAILY PRN
Qty: 90 TABLET | Refills: 11 | Status: SHIPPED | OUTPATIENT
Start: 2022-10-31

## 2022-10-31 RX ORDER — PRAVASTATIN SODIUM 40 MG
TABLET ORAL
Qty: 90 TABLET | Refills: 3 | Status: SHIPPED | OUTPATIENT
Start: 2022-10-31

## 2022-10-31 RX ORDER — LOSARTAN POTASSIUM 100 MG/1
TABLET ORAL
Qty: 90 TABLET | Refills: 3 | Status: SHIPPED | OUTPATIENT
Start: 2022-10-31

## 2022-10-31 RX ORDER — PIOGLITAZONEHYDROCHLORIDE 30 MG/1
TABLET ORAL
Qty: 90 TABLET | Refills: 3 | Status: SHIPPED | OUTPATIENT
Start: 2022-10-31

## 2022-10-31 RX ORDER — DOXYCYCLINE 100 MG/1
100 TABLET ORAL 2 TIMES DAILY
Qty: 20 TABLET | Refills: 0 | Status: SHIPPED | OUTPATIENT
Start: 2022-10-31 | End: 2022-11-10

## 2022-10-31 RX ORDER — SPIRONOLACTONE 25 MG/1
25 TABLET ORAL DAILY PRN
Qty: 90 TABLET | Refills: 3 | Status: SHIPPED | OUTPATIENT
Start: 2022-10-31

## 2022-10-31 RX ORDER — ATENOLOL 25 MG/1
25 TABLET ORAL DAILY
Qty: 90 TABLET | Refills: 3 | Status: SHIPPED | OUTPATIENT
Start: 2022-10-31

## 2022-10-31 ASSESSMENT — PATIENT HEALTH QUESTIONNAIRE - PHQ9
SUM OF ALL RESPONSES TO PHQ9 QUESTIONS 1 & 2: 0
SUM OF ALL RESPONSES TO PHQ QUESTIONS 1-9: 0
SUM OF ALL RESPONSES TO PHQ QUESTIONS 1-9: 0
2. FEELING DOWN, DEPRESSED OR HOPELESS: 0
1. LITTLE INTEREST OR PLEASURE IN DOING THINGS: 0
SUM OF ALL RESPONSES TO PHQ QUESTIONS 1-9: 0
SUM OF ALL RESPONSES TO PHQ QUESTIONS 1-9: 0

## 2022-10-31 ASSESSMENT — ENCOUNTER SYMPTOMS
SINUS PAIN: 1
COUGH: 1
SINUS PRESSURE: 1

## 2023-01-25 DIAGNOSIS — E78.5 HYPERLIPIDEMIA LDL GOAL <70: ICD-10-CM

## 2023-01-26 RX ORDER — PRAVASTATIN SODIUM 40 MG
TABLET ORAL
Qty: 90 TABLET | Refills: 3 | OUTPATIENT
Start: 2023-01-26

## 2023-02-16 ENCOUNTER — NURSE ONLY (OUTPATIENT)
Dept: FAMILY MEDICINE CLINIC | Facility: CLINIC | Age: 73
End: 2023-02-16

## 2023-02-16 DIAGNOSIS — E78.5 HYPERLIPIDEMIA LDL GOAL <70: ICD-10-CM

## 2023-02-16 DIAGNOSIS — N18.30 STAGE 3 CHRONIC KIDNEY DISEASE, UNSPECIFIED WHETHER STAGE 3A OR 3B CKD (HCC): ICD-10-CM

## 2023-02-16 DIAGNOSIS — Z12.5 SPECIAL SCREENING FOR MALIGNANT NEOPLASM OF PROSTATE: ICD-10-CM

## 2023-02-16 DIAGNOSIS — E55.9 VITAMIN D DEFICIENCY, UNSPECIFIED: ICD-10-CM

## 2023-02-16 DIAGNOSIS — E11.21 TYPE 2 DIABETES WITH NEPHROPATHY (HCC): ICD-10-CM

## 2023-02-16 DIAGNOSIS — I10 ESSENTIAL HYPERTENSION, BENIGN: ICD-10-CM

## 2023-02-16 LAB
25(OH)D3 SERPL-MCNC: 36.4 NG/ML (ref 30–100)
ALBUMIN SERPL-MCNC: 3.6 G/DL (ref 3.2–4.6)
ALBUMIN/GLOB SERPL: 0.9 (ref 0.4–1.6)
ALP SERPL-CCNC: 56 U/L (ref 50–136)
ALT SERPL-CCNC: 13 U/L (ref 12–65)
ANION GAP SERPL CALC-SCNC: 5 MMOL/L (ref 2–11)
AST SERPL-CCNC: 18 U/L (ref 15–37)
BASOPHILS # BLD: 0.1 K/UL (ref 0–0.2)
BASOPHILS NFR BLD: 1 % (ref 0–2)
BILIRUB SERPL-MCNC: 0.5 MG/DL (ref 0.2–1.1)
BUN SERPL-MCNC: 27 MG/DL (ref 8–23)
CALCIUM SERPL-MCNC: 8.6 MG/DL (ref 8.3–10.4)
CHLORIDE SERPL-SCNC: 109 MMOL/L (ref 101–110)
CHOLEST SERPL-MCNC: 131 MG/DL
CO2 SERPL-SCNC: 26 MMOL/L (ref 21–32)
CREAT SERPL-MCNC: 1.5 MG/DL (ref 0.8–1.5)
DIFFERENTIAL METHOD BLD: ABNORMAL
EOSINOPHIL # BLD: 0.5 K/UL (ref 0–0.8)
EOSINOPHIL NFR BLD: 7 % (ref 0.5–7.8)
ERYTHROCYTE [DISTWIDTH] IN BLOOD BY AUTOMATED COUNT: 14.5 % (ref 11.9–14.6)
EST. AVERAGE GLUCOSE BLD GHB EST-MCNC: 160 MG/DL
GLOBULIN SER CALC-MCNC: 4 G/DL (ref 2.8–4.5)
GLUCOSE SERPL-MCNC: 137 MG/DL (ref 65–100)
HBA1C MFR BLD: 7.2 % (ref 4.8–5.6)
HCT VFR BLD AUTO: 44.8 % (ref 41.1–50.3)
HDLC SERPL-MCNC: 38 MG/DL (ref 40–60)
HDLC SERPL: 3.4
HGB BLD-MCNC: 13.5 G/DL (ref 13.6–17.2)
IMM GRANULOCYTES # BLD AUTO: 0 K/UL (ref 0–0.5)
IMM GRANULOCYTES NFR BLD AUTO: 0 % (ref 0–5)
LDLC SERPL CALC-MCNC: 69.8 MG/DL
LYMPHOCYTES # BLD: 2.2 K/UL (ref 0.5–4.6)
LYMPHOCYTES NFR BLD: 34 % (ref 13–44)
MCH RBC QN AUTO: 28.5 PG (ref 26.1–32.9)
MCHC RBC AUTO-ENTMCNC: 30.1 G/DL (ref 31.4–35)
MCV RBC AUTO: 94.7 FL (ref 82–102)
MONOCYTES # BLD: 0.6 K/UL (ref 0.1–1.3)
MONOCYTES NFR BLD: 9 % (ref 4–12)
NEUTS SEG # BLD: 3.1 K/UL (ref 1.7–8.2)
NEUTS SEG NFR BLD: 48 % (ref 43–78)
NRBC # BLD: 0 K/UL (ref 0–0.2)
PLATELET # BLD AUTO: 237 K/UL (ref 150–450)
PMV BLD AUTO: 10.9 FL (ref 9.4–12.3)
POTASSIUM SERPL-SCNC: 4.2 MMOL/L (ref 3.5–5.1)
PROT SERPL-MCNC: 7.6 G/DL (ref 6.3–8.2)
PSA SERPL-MCNC: 1.7 NG/ML
RBC # BLD AUTO: 4.73 M/UL (ref 4.23–5.6)
SODIUM SERPL-SCNC: 140 MMOL/L (ref 133–143)
TRIGL SERPL-MCNC: 116 MG/DL (ref 35–150)
VLDLC SERPL CALC-MCNC: 23.2 MG/DL (ref 6–23)
WBC # BLD AUTO: 6.4 K/UL (ref 4.3–11.1)

## 2023-02-23 ENCOUNTER — OFFICE VISIT (OUTPATIENT)
Dept: FAMILY MEDICINE CLINIC | Facility: CLINIC | Age: 73
End: 2023-02-23

## 2023-02-23 VITALS
BODY MASS INDEX: 26.25 KG/M2 | OXYGEN SATURATION: 97 % | DIASTOLIC BLOOD PRESSURE: 88 MMHG | SYSTOLIC BLOOD PRESSURE: 140 MMHG | HEART RATE: 69 BPM | WEIGHT: 167.6 LBS

## 2023-02-23 DIAGNOSIS — E55.9 VITAMIN D DEFICIENCY: ICD-10-CM

## 2023-02-23 DIAGNOSIS — L20.89 FLEXURAL ATOPIC DERMATITIS: Primary | ICD-10-CM

## 2023-02-23 DIAGNOSIS — Z00.00 ENCOUNTER FOR ANNUAL WELLNESS VISIT (AWV) IN MEDICARE PATIENT: ICD-10-CM

## 2023-02-23 DIAGNOSIS — I10 BENIGN ESSENTIAL HYPERTENSION: ICD-10-CM

## 2023-02-23 DIAGNOSIS — E11.21 TYPE 2 DIABETES WITH NEPHROPATHY (HCC): ICD-10-CM

## 2023-02-23 PROBLEM — Z91.81 HISTORY OF RECENT FALL: Status: RESOLVED | Noted: 2021-09-26 | Resolved: 2023-02-23

## 2023-02-23 PROBLEM — Z79.84 DIABETES MELLITUS TREATED WITH ORAL MEDICATION (HCC): Status: RESOLVED | Noted: 2019-08-28 | Resolved: 2023-02-23

## 2023-02-23 PROBLEM — H25.9 AGE-RELATED CATARACT OF BOTH EYES: Status: RESOLVED | Noted: 2020-08-31 | Resolved: 2023-02-23

## 2023-02-23 PROBLEM — H04.123 INSUFFICIENCY OF TEAR FILM OF BOTH EYES: Status: RESOLVED | Noted: 2020-08-31 | Resolved: 2023-02-23

## 2023-02-23 PROBLEM — E87.1 ACUTE HYPONATREMIA: Status: RESOLVED | Noted: 2021-09-26 | Resolved: 2023-02-23

## 2023-02-23 PROBLEM — E11.9 DIABETES MELLITUS TREATED WITH ORAL MEDICATION (HCC): Status: RESOLVED | Noted: 2019-08-28 | Resolved: 2023-02-23

## 2023-02-23 PROBLEM — S20.219A RIB CONTUSION: Status: RESOLVED | Noted: 2021-09-26 | Resolved: 2023-02-23

## 2023-02-23 PROBLEM — H04.123 DRY EYES: Status: RESOLVED | Noted: 2019-08-28 | Resolved: 2023-02-23

## 2023-02-23 RX ORDER — BETAMETHASONE DIPROPIONATE 0.5 MG/G
CREAM TOPICAL
Qty: 60 G | Refills: 2 | Status: SHIPPED | OUTPATIENT
Start: 2023-02-23

## 2023-02-23 SDOH — ECONOMIC STABILITY: FOOD INSECURITY: WITHIN THE PAST 12 MONTHS, THE FOOD YOU BOUGHT JUST DIDN'T LAST AND YOU DIDN'T HAVE MONEY TO GET MORE.: NEVER TRUE

## 2023-02-23 SDOH — ECONOMIC STABILITY: FOOD INSECURITY: WITHIN THE PAST 12 MONTHS, YOU WORRIED THAT YOUR FOOD WOULD RUN OUT BEFORE YOU GOT MONEY TO BUY MORE.: NEVER TRUE

## 2023-02-23 SDOH — ECONOMIC STABILITY: INCOME INSECURITY: HOW HARD IS IT FOR YOU TO PAY FOR THE VERY BASICS LIKE FOOD, HOUSING, MEDICAL CARE, AND HEATING?: NOT HARD AT ALL

## 2023-02-23 SDOH — ECONOMIC STABILITY: HOUSING INSECURITY
IN THE LAST 12 MONTHS, WAS THERE A TIME WHEN YOU DID NOT HAVE A STEADY PLACE TO SLEEP OR SLEPT IN A SHELTER (INCLUDING NOW)?: NO

## 2023-02-23 ASSESSMENT — ANXIETY QUESTIONNAIRES
1. FEELING NERVOUS, ANXIOUS, OR ON EDGE: 0
3. WORRYING TOO MUCH ABOUT DIFFERENT THINGS: 0
2. NOT BEING ABLE TO STOP OR CONTROL WORRYING: 0
IF YOU CHECKED OFF ANY PROBLEMS ON THIS QUESTIONNAIRE, HOW DIFFICULT HAVE THESE PROBLEMS MADE IT FOR YOU TO DO YOUR WORK, TAKE CARE OF THINGS AT HOME, OR GET ALONG WITH OTHER PEOPLE: NOT DIFFICULT AT ALL
5. BEING SO RESTLESS THAT IT IS HARD TO SIT STILL: 0
7. FEELING AFRAID AS IF SOMETHING AWFUL MIGHT HAPPEN: 0
6. BECOMING EASILY ANNOYED OR IRRITABLE: 0
4. TROUBLE RELAXING: 0
GAD7 TOTAL SCORE: 0

## 2023-02-23 ASSESSMENT — ENCOUNTER SYMPTOMS
RECTAL PAIN: 0
CONSTIPATION: 0
ABDOMINAL PAIN: 0
SINUS PAIN: 0
EYE REDNESS: 0
DIARRHEA: 0
PHOTOPHOBIA: 0
ALLERGIC/IMMUNOLOGIC NEGATIVE: 1
SINUS PRESSURE: 0
GASTROINTESTINAL NEGATIVE: 1
VOMITING: 0
EYE ITCHING: 0
CHOKING: 0
RESPIRATORY NEGATIVE: 1
ANAL BLEEDING: 0
EYE PAIN: 0
TROUBLE SWALLOWING: 0
BACK PAIN: 0
EYES NEGATIVE: 1
CHEST TIGHTNESS: 0
FACIAL SWELLING: 0
SORE THROAT: 0
VOICE CHANGE: 0
COUGH: 0
BLOOD IN STOOL: 0
STRIDOR: 0
WHEEZING: 0
SHORTNESS OF BREATH: 0
EYE DISCHARGE: 0
NAUSEA: 0
COLOR CHANGE: 0
ABDOMINAL DISTENTION: 0
RHINORRHEA: 0
APNEA: 0

## 2023-02-23 ASSESSMENT — LIFESTYLE VARIABLES
HOW OFTEN DO YOU HAVE A DRINK CONTAINING ALCOHOL: NEVER
HOW OFTEN DO YOU HAVE A DRINK CONTAINING ALCOHOL: MONTHLY OR LESS
HOW MANY STANDARD DRINKS CONTAINING ALCOHOL DO YOU HAVE ON A TYPICAL DAY: 1 OR 2
HOW MANY STANDARD DRINKS CONTAINING ALCOHOL DO YOU HAVE ON A TYPICAL DAY: PATIENT DOES NOT DRINK

## 2023-02-23 ASSESSMENT — PATIENT HEALTH QUESTIONNAIRE - PHQ9
SUM OF ALL RESPONSES TO PHQ QUESTIONS 1-9: 0
SUM OF ALL RESPONSES TO PHQ QUESTIONS 1-9: 0
SUM OF ALL RESPONSES TO PHQ9 QUESTIONS 1 & 2: 0
SUM OF ALL RESPONSES TO PHQ QUESTIONS 1-9: 0
2. FEELING DOWN, DEPRESSED OR HOPELESS: 0
SUM OF ALL RESPONSES TO PHQ QUESTIONS 1-9: 0
2. FEELING DOWN, DEPRESSED OR HOPELESS: 0
SUM OF ALL RESPONSES TO PHQ QUESTIONS 1-9: 0
1. LITTLE INTEREST OR PLEASURE IN DOING THINGS: 0
SUM OF ALL RESPONSES TO PHQ QUESTIONS 1-9: 0

## 2023-02-23 NOTE — PROGRESS NOTES
Drug Samples Documentation:    Drug:Farxiga  Strength:10mg   Quantity:2 boxes of 7 tablest  Expiration Date:07/31/2025  Lot Mikaela Maxwell   NDC: [id number]

## 2023-02-23 NOTE — PROGRESS NOTES
PROGRESS NOTE    Chief Complaint   Patient presents with    Medicare AWV     Presenting for AWV and to discuss labs, DM2, HTN and HLD. SUBJECTIVE:     Saad Clayton is a very pleasant 71 y.o. male with hx of diabetes, hypertension, hyperlipidemia and CKD-currently following nephrology, seen today in office for follow up for lab results review and med refills. He is due for his AWV. He reports doing well feeling well. Patient reports no chest pain, no shortness of breath, no unilateral or focal weakness, no orthopnea or PND. GI and  review of systems is unremarkable. Past Medical History, Past Surgical History, Family history, Social History, and Medications were all reviewed with the patient today and updated as necessary. Current Outpatient Medications   Medication Sig Dispense Refill    augmented betamethasone dipropionate (DIPROLENE AF) 0.05 % cream Apply topically 2 times daily.  60 g 2    atenolol (TENORMIN) 25 MG tablet Take 1 tablet by mouth daily For blood pressure 90 tablet 3    losartan (COZAAR) 100 MG tablet TAKE ONE TABLET BY MOUTH ONE TIME DAILY FOR BLOOD PRESSURE 90 tablet 3    meclizine (ANTIVERT) 25 MG tablet Take 1 tablet by mouth 3 times daily as needed for Dizziness 90 tablet 11    pioglitazone (ACTOS) 30 MG tablet TAKE ONE TABLET BY MOUTH ONE TIME DAILY FOR SUGAR 90 tablet 3    pravastatin (PRAVACHOL) 40 MG tablet TAKE ONE TABLET BY MOUTH ONE TIME DAILY FOR CHOLESTEROL 90 tablet 3    spironolactone (ALDACTONE) 25 MG tablet Take 1 tablet by mouth daily as needed (swelling) 90 tablet 3    dapagliflozin (FARXIGA) 10 MG tablet Take 1 tablet by mouth every morning For sugar and kidneys 90 tablet 3    brimonidine-timolol (COMBIGAN) 0.2-0.5 % ophthalmic solution       ACCU-CHEK JENNIFER PLUS strip Inject 1 each into the skin 2 times daily 100 each 11    vitamin D3 (CHOLECALCIFEROL) 125 MCG (5000 UT) TABS tablet Take 5,000 Units by mouth      fluticasone (FLONASE) 50 MCG/ACT nasal spray 2 sprays by Nasal route daily      lidocaine 4 % external patch Intact skin only. On affected area. No current facility-administered medications for this visit. No Known Allergies  Patient Active Problem List   Diagnosis    Fatty liver    Moderate nonproliferative diabetic retinopathy associated with type 2 diabetes mellitus (HCC)    Type 2 diabetes with nephropathy (HCC)    Benign paroxysmal positional vertigo    Benign essential hypertension    Pure hypercholesterolemia     Past Medical History:   Diagnosis Date    Essential hypertension, benign     H/O appendicitis     Hyperlipidemia     Type 2 diabetes mellitus without complication ([facility] [address]     normal 90, checks once a day     Wears dentures      Past Surgical History:   Procedure Laterality Date    APPENDECTOMY  1986    COLONOSCOPY N/A 5/22/2017    COLONOSCOPY / BMI / 27 performed by Yuliya Dent MD at [facility] ENDOSCOPY    COLONOSCOPY FLX DX W/COLLJ SPEC WHEN PFRMD  5/22/2017          Family History   Problem Relation Age of Onset    Diabetes Sister         type II    Diabetes Brother         type II     Social History     Tobacco Use    Smoking status: Never    Smokeless tobacco: Never   Substance Use Topics    Alcohol use: Yes     Alcohol/week: 0.0 standard drinks         REVIEW OF SYSTEM    Review of Systems   Constitutional: Negative. Negative for activity change, appetite change, chills, diaphoresis, fatigue, fever and unexpected weight change. HENT: Negative. Negative for congestion, dental problem, drooling, ear discharge, ear pain, facial swelling, hearing loss, mouth sores, nosebleeds, postnasal drip, rhinorrhea, sinus pressure, sinus pain, sneezing, sore throat, tinnitus, trouble swallowing and voice change. Eyes: Negative. Negative for photophobia, pain, discharge, redness, itching and visual disturbance. Respiratory: Negative. Negative for apnea, cough, choking, chest tightness, shortness of breath, wheezing and stridor. Cardiovascular: Negative. Negative for chest pain, palpitations and leg swelling. Gastrointestinal: Negative. Negative for abdominal distention, abdominal pain, anal bleeding, blood in stool, constipation, diarrhea, nausea, rectal pain and vomiting. Endocrine: Negative. Negative for cold intolerance, heat intolerance, polydipsia, polyphagia and polyuria. Genitourinary: Negative. Negative for decreased urine volume, difficulty urinating, dysuria, enuresis, flank pain, frequency, genital sores, hematuria and urgency. Musculoskeletal: Negative. Negative for arthralgias, back pain, gait problem, joint swelling, myalgias, neck pain and neck stiffness. Skin:  Negative for color change, pallor, rash and wound. Allergic/Immunologic: Negative. Negative for environmental allergies, food allergies and immunocompromised state. Neurological: Negative. Negative for dizziness, tremors, seizures, syncope, facial asymmetry, speech difficulty, weakness, light-headedness, numbness and headaches. Hematological: Negative. Negative for adenopathy. Does not bruise/bleed easily. Psychiatric/Behavioral: Negative. Negative for agitation, behavioral problems, confusion, decreased concentration, dysphoric mood, hallucinations, self-injury, sleep disturbance and suicidal ideas. The patient is not nervous/anxious and is not hyperactive. OBJECTIVE:  BP (!) 140/88   Pulse 69   Wt 167 lb 9.6 oz (76 kg)   SpO2 97%   BMI 26.25 kg/m²      Physical Exam      Medical problems and test results were reviewed with the patient today.    Lab Results   Component Value Date     02/16/2023    K 4.2 02/16/2023     02/16/2023    CO2 26 02/16/2023    BUN 27 (H) 02/16/2023    CREATININE 1.50 02/16/2023    GLUCOSE 137 (H) 02/16/2023    CALCIUM 8.6 02/16/2023    PROT 7.6 02/16/2023    LABALBU 3.6 02/16/2023    BILITOT 0.5 02/16/2023    ALKPHOS 56 02/16/2023    AST 18 02/16/2023    ALT 13 02/16/2023    LABGLOM 49 (L) 02/16/2023    GFRAA 51 (L) 02/21/2022    AGRATIO 1.5 02/21/2022    GLOB 4.0 02/16/2023       Lab Results   Component Value Date    WBC 6.4 02/16/2023    HGB 13.5 (L) 02/16/2023    HCT 44.8 02/16/2023    MCV 94.7 02/16/2023     02/16/2023     Hemoglobin A1C   Date Value Ref Range Status   02/16/2023 7.2 (H) 4.8 - 5.6 % Final     Lab Results   Component Value Date    CHOL 131 02/16/2023    CHOL 103 10/24/2022    CHOL 151 02/21/2022     Lab Results   Component Value Date    TRIG 116 02/16/2023    TRIG 114 10/24/2022    TRIG 111 02/21/2022     Lab Results   Component Value Date    HDL 38 (L) 02/16/2023    HDL 33 (L) 10/24/2022    HDL 43 02/21/2022     Lab Results   Component Value Date    LDLCALC 69.8 02/16/2023    LDLCALC 47.2 10/24/2022    LDLCALC 88 02/21/2022     Lab Results   Component Value Date    LABVLDL 23.2 (H) 02/16/2023    LABVLDL 22.8 10/24/2022    LABVLDL 41 (H) 01/17/2020    VLDL 20 02/21/2022    VLDL 20 10/12/2021    VLDL 29 07/13/2021     Lab Results   Component Value Date    CHOLHDLRATIO 3.4 02/16/2023    CHOLHDLRATIO 3.1 10/24/2022         ASSESSMENT and PLAN    HTN  States that he takes his medication at night and exercises twice a week. Denies chest pain and shortness of breath. Will continue Losartan Blood pressure readings have been 120/130- 70/80 at home with some lows of 106 when he changes his diet to vegatables for the day. DM2  A1C is 7.2. Taking Farxiga 10 mg QD. Continue with [facility] Delgado. Currently going through the process of requesting patient assistance but having some difficulty. 1. Flexural atopic dermatitis   Patient has thickening of rash and dry skin to fingertips on both hands. He has tried OTC regimen with minimal relief. Will give prescription for betamethasone cream for treatment. Advised patient on avoidance of continuous daily use more than 10 days due to risk of hypopigmentation and thinning of the skin from steroidal cream use.     2. Benign essential hypertension   Blood pressure today in office is 140/88. However, patient reports home blood pressure reading around 726-664 for systolic reading with 61-59 for diastolic reading. He also has occasional low 106/70 whenever he is vegan with dietary changes and he does endorse dizziness with episodes. Will allow for slightly elevated reading in BP today. Advised patient to continue to monitor his blood pressure at home once to twice daily and to let us know with any persistent reading of 140 to 535 for systolic reading. 3. Type 2 diabetes with nephropathy (HCC)   A1c improved and is now noted at 7.2. Creatinine level is 1.5 with filtration rate of 50. Patient is established with nephrology. He is currently on Farxiga along with pioglitazone. He reports no side effects with medication. He does report that Alton Angélica is quite expensive and is getting some assistance from his nephrologist.  We will also provide some samples today. We will have patient continue current therapy and repeat labs in 6 months. Patient currently follows [facility] every 3 months. 4.  Encounter for annual wellness visit (AWV) in Medicare patient   Anticipatory guidance for age-seatbelts, avoid cell phone use in car (may use hands free), no texting while driving, avoid social drugs and tobacco, limit alcohol, fall safety, personal safety with appropriate use of helmets and equipment for protection, and appropriate use of sun screen and sun protection to prevent skin cancer. Encourage healthy diet and exercise daily.       Orders Placed This Encounter   Procedures    Vitamin D 25 Hydroxy     Standing Status:   Future     Standing Expiration Date:   2/23/2024    Hemoglobin A1C     Standing Status:   Future     Standing Expiration Date:   2/23/2024    Lipid Panel     Standing Status:   Future     Standing Expiration Date:   2/23/2024    Comprehensive Metabolic Panel     Standing Status:   Future     Standing Expiration Date: 2/23/2024    TSH with Reflex     Standing Status:   Future     Standing Expiration Date:   2/23/2024    CBC with Auto Differential     Standing Status:   Future     Standing Expiration Date:   2/23/2024    AMB POC URINALYSIS DIP STICK AUTO W/O MICRO     Standing Status:   Future     Standing Expiration Date:   2/23/2024    AMB POC URINE, MICROALBUMIN     Standing Status:   Future     Standing Expiration Date:   2/23/2024           Elements of this note have been dictated using speech recognition software. As a result, errors of speech recognition may have occurred. On this date 2/23/2023 I have spent 30 minutes reviewing previous notes, test results and face to face with the patient discussing the diagnosis and importance of compliance with the treatment plan as well as documenting on the day of the visit. Greater than 50% of this visit was spent counseling the patient about test results, prognosis, importance of compliance, education about disease process, benefits of medications, instructions for management of acute symptoms, and follow up plans. Return DM2, HTN CKD, for Follow up with fasting labs prior. INO Omer - CNP    Medicare Annual Wellness Visit    Soren Peraleskandy is here for Medicare AWV (Presenting for AWV and to discuss labs, DM2, HTN and HLD. )    Assessment & Plan   Flexural atopic dermatitis  Benign essential hypertension  -     AMB POC URINALYSIS DIP STICK AUTO W/O MICRO; Future  Type 2 diabetes with nephropathy (HCC)  -     Hemoglobin A1C; Future  -     Lipid Panel; Future  -     Comprehensive Metabolic Panel; Future  -     AMB POC URINALYSIS DIP STICK AUTO W/O MICRO; Future  -     AMB POC URINE, MICROALBUMIN; Future  -     TSH with Reflex; Future  -     CBC with Auto Differential; Future  Encounter for annual wellness visit (AWV) in Medicare patient  Vitamin D deficiency  -     Vitamin D 25 Hydroxy;  Future      Recommendations for Preventive Services Due: see orders and patient instructions/AVS.  Recommended screening schedule for the next 5-10 years is provided to the patient in written form: see Patient Instructions/AVS.     Return DM2, HTN CKD, for Follow up with fasting labs prior. Subjective       Patient's complete Health Risk Assessment and screening values have been reviewed and are found in Flowsheets. The following problems were reviewed today and where indicated follow up appointments were made and/or referrals ordered. Positive Risk Factor Screenings with Interventions:                     Safety:  Do you have any tripping hazards - loose or unsecured carpets or rugs?: (!) Yes  Do you have any tripping hazards - clutter in doorways, halls, or stairs?: (!) Yes  Interventions:  Fall safety precaution reviewed and discussed. Advanced Directives:  Do you have a Living Will?: (!) No    Intervention:  has NO advanced directive - not interested in additional information                       Objective   Vitals:    02/23/23 0842 02/23/23 0843 02/23/23 0844   BP: (!) 140/80 (!) 144/84 (!) 140/88   Site: Left Upper Arm Right Upper Arm    Position: Sitting Sitting    Cuff Size: Medium Adult Medium Adult    Pulse: 69     SpO2: 97%     Weight: 167 lb 9.6 oz (76 kg)        Body mass index is 26.25 kg/m². No Known Allergies  Prior to Visit Medications    Medication Sig Taking? Authorizing Provider   augmented betamethasone dipropionate (DIPROLENE AF) 0.05 % cream Apply topically 2 times daily.  Yes INO Kahn CNP   atenolol (TENORMIN) 25 MG tablet Take 1 tablet by mouth daily For blood pressure  INO Pickett CNP   losartan (COZAAR) 100 MG tablet TAKE ONE TABLET BY MOUTH ONE TIME DAILY FOR BLOOD PRESSURE  INO Pickett CNP   meclizine (ANTIVERT) 25 MG tablet Take 1 tablet by mouth 3 times daily as needed for Dizziness  INO Pickett CNP   pioglitazone (ACTOS) 30 MG tablet TAKE ONE TABLET BY MOUTH ONE TIME DAILY FOR SUGAR INO Marcus CNP   pravastatin (PRAVACHOL) 40 MG tablet TAKE ONE TABLET BY MOUTH ONE TIME DAILY FOR CHOLESTEROL  INO Pineda CNP   spironolactone (ALDACTONE) 25 MG tablet Take 1 tablet by mouth daily as needed (swelling)  INO Pineda CNP   dapagliflozin (FARXIGA) 10 MG tablet Take 1 tablet by mouth every morning For sugar and kidneys  INO Pineda CNP   brimonidine-timolol (COMBIGAN) 0.2-0.5 % ophthalmic solution   Historical Provider, MD SADLER JENNIFER PLUS strip Inject 1 each into the skin 2 times daily  INO Pineda CNP   vitamin D3 (CHOLECALCIFEROL) 125 MCG (5000 UT) TABS tablet Take 5,000 Units by mouth  Ar Automatic Reconciliation   fluticasone (FLONASE) 50 MCG/ACT nasal spray 2 sprays by Nasal route daily  Ar Automatic Reconciliation   lidocaine 4 % external patch Intact skin only. On affected area.   Ar Automatic Reconciliation       CareTeam (Including outside providers/suppliers regularly involved in providing care):   Patient Care Team:  INO Marcus CNP as PCP - General  INO Marcus CNP as PCP - Empaneled Provider     Reviewed and updated this visit:  Tobacco  Allergies  Meds  Problems  Med Hx  Surg Hx  Soc Hx  Fam Hx               INO Pindea CNP

## 2023-02-23 NOTE — PATIENT INSTRUCTIONS
Advance Directives: Care Instructions  Overview  An advance directive is a legal way to state your wishes at the end of your life. It tells your family and your doctor what to do if you can't say what you want. There are two main types of advance directives. You can change them any time your wishes change. Living will. This form tells your family and your doctor your wishes about life support and other treatment. The form is also called a declaration. Medical power of . This form lets you name a person to make treatment decisions for you when you can't speak for yourself. This person is called a health care agent (health care proxy, health care surrogate). The form is also called a durable power of  for health care. If you do not have an advance directive, decisions about your medical care may be made by a family member, or by a doctor or a  who doesn't know you. It may help to think of an advance directive as a gift to the people who care for you. If you have one, they won't have to make tough decisions by themselves. For more information, including forms for your state, see the [address] website (www.caringinfo.org/planning/advance-directives/). Follow-up care is a key part of your treatment and safety. Be sure to make and go to all appointments, and call your doctor if you are having problems. It's also a good idea to know your test results and keep a list of the medicines you take. What should you include in an advance directive? Many states have a unique advance directive form. (It may ask you to address specific issues.) Or you might use a universal form that's approved by many states. If your form doesn't tell you what to address, it may be hard to know what to include in your advance directive. Use the questions below to help you get started. Who do you want to make decisions about your medical care if you are not able to?   What life-support measures do you want if you have a serious illness that gets worse over time or can't be cured? What are you most afraid of that might happen? (Maybe you're afraid of having pain, losing your independence, or being kept alive by machines.)  Where would you prefer to die? (Your home? A hospital? A nursing home?)  Do you want to donate your organs when you die? Do you want certain Confucianism practices performed before you die? When should you call for help? Be sure to contact your doctor if you have any questions. Where can you learn more? Go to http://www.cui.com/ and enter R264 to learn more about \"Advance Directives: Care Instructions. \"  Current as of: June 16, 2022               Content Version: 13.5  © 2006-2022 [employer]. Care instructions adapted under license by [facility] [place] ([facility]). If you have questions about a medical condition or this instruction, always ask your healthcare professional. [place] [address] any warranty or liability for your use of this information. A Healthy Heart: Care Instructions  Your Care Instructions     Coronary artery disease, also called heart disease, occurs when a substance called plaque builds up in the vessels that supply oxygen-rich blood to your heart muscle. This can narrow the blood vessels and reduce blood flow. A heart attack happens when blood flow is completely blocked. A high-fat diet, smoking, and other factors increase the risk of heart disease. Your doctor has found that you have a chance of having heart disease. You can do lots of things to keep your heart healthy. It may not be easy, but you can change your diet, exercise more, and quit smoking. These steps really work to lower your chance of heart disease. Follow-up care is a key part of your treatment and safety. Be sure to make and go to all appointments, and call your doctor if you are having problems.  It's also a good idea to know your test results and keep a list of the medicines you take. How can you care for yourself at home? Diet    Use less salt when you cook and eat. This helps lower your blood pressure. Taste food before salting. Add only a little salt when you think you need it. With time, your taste buds will adjust to less salt.     Eat fewer snack items, fast foods, canned soups, and other high-salt, high-fat, processed foods.     Read food labels and try to avoid saturated and trans fats. They increase your risk of heart disease by raising cholesterol levels.     Limit the amount of solid fat-butter, margarine, and shortening-you eat. Use olive, peanut, or canola oil when you cook. Bake, broil, and steam foods instead of frying them.     Eat a variety of fruit and vegetables every day. Dark green, deep orange, red, or yellow fruits and vegetables are especially good for you. Examples include spinach, carrots, peaches, and berries.     Foods high in fiber can reduce your cholesterol and provide important vitamins and minerals. High-fiber foods include whole-grain cereals and breads, oatmeal, beans, brown rice, citrus fruits, and apples.     Eat lean proteins. Heart-healthy proteins include seafood, lean meats and poultry, eggs, beans, peas, nuts, seeds, and soy products.     Limit drinks and foods with added sugar. These include candy, desserts, and soda pop. Lifestyle changes    If your doctor recommends it, get more exercise. Walking is a good choice. Bit by bit, increase the amount you walk every day. Try for at least 30 minutes on most days of the week. You also may want to swim, bike, or do other activities.     Do not smoke. If you need help quitting, talk to your doctor about stop-smoking programs and medicines. These can increase your chances of quitting for good. Quitting smoking may be the most important step you can take to protect your heart. It is never too late to quit.     Limit alcohol to 2 drinks a day for men and 1 drink a day for women.  Too much alcohol can cause health problems.     Manage other health problems such as diabetes, high blood pressure, and high cholesterol. If you think you may have a problem with alcohol or drug use, talk to your doctor. Medicines    Take your medicines exactly as prescribed. Call your doctor if you think you are having a problem with your medicine.     If your doctor recommends aspirin, take the amount directed each day. Make sure you take aspirin and not another kind of pain reliever, such as acetaminophen (Tylenol). When should you call for help? Call 911 if you have symptoms of a heart attack. These may include:    Chest pain or pressure, or a strange feeling in the chest.     Sweating.     Shortness of breath.     Pain, pressure, or a strange feeling in the back, neck, jaw, or upper belly or in one or both shoulders or arms.     Lightheadedness or sudden weakness.     A fast or irregular heartbeat. After you call 911, the  may tell you to chew 1 adult-strength or 2 to 4 low-dose aspirin. Wait for an ambulance. Do not try to drive yourself. Watch closely for changes in your health, and be sure to contact your doctor if you have any problems. Where can you learn more? Go to http://www.cui.com/ and enter F075 to learn more about \"A Healthy Heart: Care Instructions. \"  Current as of: September 7, 2022               Content Version: 13.5  © 5459-8009 [employer]. Care instructions adapted under license by [facility] ([facility]). If you have questions about a medical condition or this instruction, always ask your healthcare professional. [place] [address] any warranty or liability for your use of this information. Personalized Preventive Plan for Alexandru Marker - 2/23/2023  Medicare offers a range of preventive health benefits. Some of the tests and screenings are paid in full while other may be subject to a deductible, co-insurance, and/or copay.     Some of these benefits include a comprehensive review of your medical history including lifestyle, illnesses that may run in your family, and various assessments and screenings as appropriate. After reviewing your medical record and screening and assessments performed today your provider may have ordered immunizations, labs, imaging, and/or referrals for you. A list of these orders (if applicable) as well as your Preventive Care list are included within your After Visit Summary for your review. Other Preventive Recommendations:    A preventive eye exam performed by an eye specialist is recommended every 1-2 years to screen for glaucoma; cataracts, macular degeneration, and other eye disorders. A preventive dental visit is recommended every 6 months. Try to get at least 150 minutes of exercise per week or 10,000 steps per day on a pedometer . Order or download the FREE \"Exercise & Physical Activity: Your Everyday Guide\" from The [employer]. Call 1-377.712.4589 or search [employer] online. You need 9429-2843 mg of calcium and 8614-8108 IU of vitamin D per day. It is possible to meet your calcium requirement with diet alone, but a vitamin D supplement is usually necessary to meet this goal.  When exposed to the sun, use a sunscreen that protects against both UVA and UVB radiation with an SPF of 30 or greater. Reapply every 2 to 3 hours or after sweating, drying off with a towel, or swimming. Always wear a seat belt when traveling in a car. Always wear a helmet when riding a bicycle or motorcycle.

## 2023-06-27 ENCOUNTER — OFFICE VISIT (OUTPATIENT)
Dept: FAMILY MEDICINE CLINIC | Facility: CLINIC | Age: 73
End: 2023-06-27
Payer: MEDICARE

## 2023-06-27 VITALS
BODY MASS INDEX: 25.53 KG/M2 | DIASTOLIC BLOOD PRESSURE: 82 MMHG | WEIGHT: 163 LBS | OXYGEN SATURATION: 99 % | HEART RATE: 62 BPM | SYSTOLIC BLOOD PRESSURE: 144 MMHG

## 2023-06-27 DIAGNOSIS — E11.21 TYPE 2 DIABETES MELLITUS WITH DIABETIC NEPHROPATHY, WITHOUT LONG-TERM CURRENT USE OF INSULIN (HCC): ICD-10-CM

## 2023-06-27 DIAGNOSIS — I10 BENIGN ESSENTIAL HYPERTENSION: Primary | ICD-10-CM

## 2023-06-27 PROCEDURE — 99213 OFFICE O/P EST LOW 20 MIN: CPT | Performed by: NURSE PRACTITIONER

## 2023-06-27 PROCEDURE — 3079F DIAST BP 80-89 MM HG: CPT | Performed by: NURSE PRACTITIONER

## 2023-06-27 PROCEDURE — 3051F HG A1C>EQUAL 7.0%<8.0%: CPT | Performed by: NURSE PRACTITIONER

## 2023-06-27 PROCEDURE — 1123F ACP DISCUSS/DSCN MKR DOCD: CPT | Performed by: NURSE PRACTITIONER

## 2023-06-27 PROCEDURE — 3077F SYST BP >= 140 MM HG: CPT | Performed by: NURSE PRACTITIONER

## 2023-06-27 ASSESSMENT — ENCOUNTER SYMPTOMS
CONSTIPATION: 0
EYE REDNESS: 0
STRIDOR: 0
EYE DISCHARGE: 0
BLOOD IN STOOL: 0
SINUS PAIN: 0
APNEA: 0
BACK PAIN: 0
ABDOMINAL PAIN: 0
ALLERGIC/IMMUNOLOGIC NEGATIVE: 1
WHEEZING: 0
DIARRHEA: 0
SINUS PRESSURE: 0
FACIAL SWELLING: 0
ABDOMINAL DISTENTION: 0
GASTROINTESTINAL NEGATIVE: 1
COLOR CHANGE: 0
PHOTOPHOBIA: 0
RESPIRATORY NEGATIVE: 1
EYE ITCHING: 0
VOMITING: 0
RHINORRHEA: 0
COUGH: 0
SHORTNESS OF BREATH: 0
VOICE CHANGE: 0
NAUSEA: 0
CHEST TIGHTNESS: 0
TROUBLE SWALLOWING: 0
EYES NEGATIVE: 1
CHOKING: 0
ANAL BLEEDING: 0
RECTAL PAIN: 0
EYE PAIN: 0
SORE THROAT: 0

## 2023-08-24 ENCOUNTER — NURSE ONLY (OUTPATIENT)
Dept: FAMILY MEDICINE CLINIC | Facility: CLINIC | Age: 73
End: 2023-08-24
Payer: MEDICARE

## 2023-08-24 DIAGNOSIS — E55.9 VITAMIN D DEFICIENCY: ICD-10-CM

## 2023-08-24 DIAGNOSIS — E11.21 TYPE 2 DIABETES WITH NEPHROPATHY (HCC): ICD-10-CM

## 2023-08-24 DIAGNOSIS — I10 BENIGN ESSENTIAL HYPERTENSION: ICD-10-CM

## 2023-08-24 LAB
25(OH)D3 SERPL-MCNC: 36.4 NG/ML (ref 30–100)
ALBUMIN SERPL-MCNC: 3.9 G/DL (ref 3.2–4.6)
ALBUMIN/GLOB SERPL: 1 (ref 0.4–1.6)
ALP SERPL-CCNC: 53 U/L (ref 50–136)
ALT SERPL-CCNC: 21 U/L (ref 12–65)
ANION GAP SERPL CALC-SCNC: 5 MMOL/L (ref 2–11)
AST SERPL-CCNC: 19 U/L (ref 15–37)
BASOPHILS # BLD: 0 K/UL (ref 0–0.2)
BASOPHILS NFR BLD: 0 % (ref 0–2)
BILIRUB SERPL-MCNC: 0.7 MG/DL (ref 0.2–1.1)
BILIRUBIN, URINE, POC: NEGATIVE
BLOOD URINE, POC: NEGATIVE
BUN SERPL-MCNC: 30 MG/DL (ref 8–23)
CALCIUM SERPL-MCNC: 8.5 MG/DL (ref 8.3–10.4)
CHLORIDE SERPL-SCNC: 109 MMOL/L (ref 101–110)
CHOLEST SERPL-MCNC: 166 MG/DL
CO2 SERPL-SCNC: 22 MMOL/L (ref 21–32)
CREAT SERPL-MCNC: 1.4 MG/DL (ref 0.8–1.5)
DIFFERENTIAL METHOD BLD: NORMAL
EOSINOPHIL # BLD: 0.3 K/UL (ref 0–0.8)
EOSINOPHIL NFR BLD: 3 % (ref 0.5–7.8)
ERYTHROCYTE [DISTWIDTH] IN BLOOD BY AUTOMATED COUNT: 13 % (ref 11.9–14.6)
GLOBULIN SER CALC-MCNC: 3.9 G/DL (ref 2.8–4.5)
GLUCOSE SERPL-MCNC: 129 MG/DL (ref 65–100)
GLUCOSE URINE, POC: ABNORMAL
HCT VFR BLD AUTO: 45 % (ref 41.1–50.3)
HDLC SERPL-MCNC: 47 MG/DL (ref 40–60)
HDLC SERPL: 3.5
HGB BLD-MCNC: 14.2 G/DL (ref 13.6–17.2)
IMM GRANULOCYTES # BLD AUTO: 0 K/UL (ref 0–0.5)
IMM GRANULOCYTES NFR BLD AUTO: 0 % (ref 0–5)
KETONES, URINE, POC: NEGATIVE
LDLC SERPL CALC-MCNC: 96.6 MG/DL
LEUKOCYTE ESTERASE, URINE, POC: NEGATIVE
LYMPHOCYTES # BLD: 1.6 K/UL (ref 0.5–4.6)
LYMPHOCYTES NFR BLD: 16 % (ref 13–44)
MCH RBC QN AUTO: 29.9 PG (ref 26.1–32.9)
MCHC RBC AUTO-ENTMCNC: 31.6 G/DL (ref 31.4–35)
MCV RBC AUTO: 94.7 FL (ref 82–102)
MICROALB/CREAT RATIO, POC: ABNORMAL
MICROALBUMIN URINE, POC: 50 MG/L
MONOCYTES # BLD: 0.5 K/UL (ref 0.1–1.3)
MONOCYTES NFR BLD: 6 % (ref 4–12)
NEUTS SEG # BLD: 7.3 K/UL (ref 1.7–8.2)
NEUTS SEG NFR BLD: 75 % (ref 43–78)
NITRITE, URINE, POC: NEGATIVE
NRBC # BLD: 0 K/UL (ref 0–0.2)
PH, URINE, POC: 6 (ref 4.6–8)
PLATELET # BLD AUTO: 282 K/UL (ref 150–450)
PMV BLD AUTO: 10.7 FL (ref 9.4–12.3)
POTASSIUM SERPL-SCNC: 4.7 MMOL/L (ref 3.5–5.1)
PROT SERPL-MCNC: 7.8 G/DL (ref 6.3–8.2)
PROTEIN,URINE, POC: NEGATIVE
RBC # BLD AUTO: 4.75 M/UL (ref 4.23–5.6)
SODIUM SERPL-SCNC: 136 MMOL/L (ref 133–143)
SPECIFIC GRAVITY, URINE, POC: 1.01 (ref 1–1.03)
TRIGL SERPL-MCNC: 112 MG/DL (ref 35–150)
TSH W FREE THYROID IF ABNORMAL: 1.84 UIU/ML (ref 0.36–3.74)
URINALYSIS CLARITY, POC: CLEAR
URINALYSIS COLOR, POC: YELLOW
UROBILINOGEN, POC: NORMAL
VLDLC SERPL CALC-MCNC: 22.4 MG/DL (ref 6–23)
WBC # BLD AUTO: 9.8 K/UL (ref 4.3–11.1)

## 2023-08-24 PROCEDURE — 82043 UR ALBUMIN QUANTITATIVE: CPT | Performed by: NURSE PRACTITIONER

## 2023-08-24 PROCEDURE — 81003 URINALYSIS AUTO W/O SCOPE: CPT | Performed by: NURSE PRACTITIONER

## 2023-08-25 LAB
EST. AVERAGE GLUCOSE BLD GHB EST-MCNC: 148 MG/DL
HBA1C MFR BLD: 6.8 % (ref 4.8–5.6)

## 2023-08-31 ENCOUNTER — OFFICE VISIT (OUTPATIENT)
Dept: FAMILY MEDICINE CLINIC | Facility: CLINIC | Age: 73
End: 2023-08-31
Payer: MEDICARE

## 2023-08-31 VITALS
WEIGHT: 161.2 LBS | HEIGHT: 67 IN | BODY MASS INDEX: 25.3 KG/M2 | OXYGEN SATURATION: 98 % | SYSTOLIC BLOOD PRESSURE: 136 MMHG | DIASTOLIC BLOOD PRESSURE: 70 MMHG | HEART RATE: 80 BPM

## 2023-08-31 DIAGNOSIS — E11.21 TYPE 2 DIABETES WITH NEPHROPATHY (HCC): Primary | ICD-10-CM

## 2023-08-31 DIAGNOSIS — I10 ESSENTIAL HYPERTENSION, BENIGN: ICD-10-CM

## 2023-08-31 DIAGNOSIS — N18.30 STAGE 3 CHRONIC KIDNEY DISEASE, UNSPECIFIED WHETHER STAGE 3A OR 3B CKD (HCC): ICD-10-CM

## 2023-08-31 DIAGNOSIS — E55.9 VITAMIN D DEFICIENCY: ICD-10-CM

## 2023-08-31 DIAGNOSIS — E78.5 HYPERLIPIDEMIA LDL GOAL <70: ICD-10-CM

## 2023-08-31 PROCEDURE — 1123F ACP DISCUSS/DSCN MKR DOCD: CPT | Performed by: NURSE PRACTITIONER

## 2023-08-31 PROCEDURE — 99214 OFFICE O/P EST MOD 30 MIN: CPT | Performed by: NURSE PRACTITIONER

## 2023-08-31 PROCEDURE — 3075F SYST BP GE 130 - 139MM HG: CPT | Performed by: NURSE PRACTITIONER

## 2023-08-31 PROCEDURE — 3078F DIAST BP <80 MM HG: CPT | Performed by: NURSE PRACTITIONER

## 2023-08-31 PROCEDURE — 3044F HG A1C LEVEL LT 7.0%: CPT | Performed by: NURSE PRACTITIONER

## 2023-08-31 RX ORDER — LOSARTAN POTASSIUM 100 MG/1
TABLET ORAL
Qty: 90 TABLET | Refills: 3 | Status: SHIPPED | OUTPATIENT
Start: 2023-08-31

## 2023-08-31 RX ORDER — PIOGLITAZONEHYDROCHLORIDE 30 MG/1
TABLET ORAL
Qty: 90 TABLET | Refills: 3 | Status: SHIPPED | OUTPATIENT
Start: 2023-08-31

## 2023-08-31 RX ORDER — SPIRONOLACTONE 25 MG/1
25 TABLET ORAL DAILY PRN
Qty: 90 TABLET | Refills: 3 | Status: CANCELLED | OUTPATIENT
Start: 2023-08-31

## 2023-08-31 RX ORDER — ATENOLOL 25 MG/1
25 TABLET ORAL DAILY
Qty: 90 TABLET | Refills: 3 | Status: SHIPPED | OUTPATIENT
Start: 2023-08-31

## 2023-08-31 RX ORDER — PRAVASTATIN SODIUM 40 MG
TABLET ORAL
Qty: 90 TABLET | Refills: 3 | Status: CANCELLED | OUTPATIENT
Start: 2023-08-31

## 2023-08-31 RX ORDER — SODIUM BICARBONATE 650 MG/1
650 TABLET ORAL 2 TIMES DAILY
COMMUNITY
Start: 2023-07-25

## 2023-08-31 RX ORDER — ATORVASTATIN CALCIUM 40 MG/1
40 TABLET, FILM COATED ORAL DAILY
Qty: 90 TABLET | Refills: 1 | Status: SHIPPED | OUTPATIENT
Start: 2023-08-31

## 2023-08-31 RX ORDER — MECLIZINE HYDROCHLORIDE 25 MG/1
25 TABLET ORAL 3 TIMES DAILY PRN
Qty: 90 TABLET | Refills: 11 | Status: CANCELLED | OUTPATIENT
Start: 2023-08-31

## 2023-08-31 RX ORDER — CLOBETASOL PROPIONATE 0.5 MG/G
CREAM TOPICAL
Qty: 60 G | Refills: 5 | Status: SHIPPED | OUTPATIENT
Start: 2023-08-31

## 2023-08-31 RX ORDER — CLOBETASOL PROPIONATE 0.5 MG/G
CREAM TOPICAL
COMMUNITY
Start: 2023-08-21 | End: 2023-08-31 | Stop reason: SDUPTHER

## 2023-08-31 ASSESSMENT — ENCOUNTER SYMPTOMS
SINUS PAIN: 0
COLOR CHANGE: 0
SINUS PRESSURE: 0
COUGH: 0
EYE PAIN: 0
BLOOD IN STOOL: 0
ALLERGIC/IMMUNOLOGIC NEGATIVE: 1
ANAL BLEEDING: 0
CHEST TIGHTNESS: 0
APNEA: 0
TROUBLE SWALLOWING: 0
ABDOMINAL PAIN: 0
SHORTNESS OF BREATH: 0
RHINORRHEA: 0
EYE DISCHARGE: 0
STRIDOR: 0
CONSTIPATION: 0
EYE REDNESS: 0
VOICE CHANGE: 0
EYES NEGATIVE: 1
VOMITING: 0
DIARRHEA: 0
NAUSEA: 0
PHOTOPHOBIA: 0
WHEEZING: 0
FACIAL SWELLING: 0
CHOKING: 0
RECTAL PAIN: 0
GASTROINTESTINAL NEGATIVE: 1
RESPIRATORY NEGATIVE: 1
BACK PAIN: 0
SORE THROAT: 0
ABDOMINAL DISTENTION: 0
EYE ITCHING: 0

## 2023-08-31 NOTE — PROGRESS NOTES
Medication Sample Documentation:    Drug: Esperanza  Strength: 10 mg  Quantity: 1 box of 7 tablets  NDC: 8893-0214-24  Lot: [id number]  Exp: 4/2026    Medication Sample Documentation:    Drug: Esperanza  Strength: 5 mg  Quantity: 2 boxes of 7 tablets  NDC: 4741-1832-14  Lot: [id number]  Exp: 8/2025
Anna Sanders - CNP

## 2023-11-02 DIAGNOSIS — E11.65 TYPE 2 DIABETES MELLITUS WITH HYPERGLYCEMIA, WITHOUT LONG-TERM CURRENT USE OF INSULIN (HCC): ICD-10-CM

## 2023-11-02 RX ORDER — BLOOD SUGAR DIAGNOSTIC
STRIP MISCELLANEOUS
Qty: 100 STRIP | Refills: 11 | Status: SHIPPED | OUTPATIENT
Start: 2023-11-02

## 2023-11-05 DIAGNOSIS — E78.5 HYPERLIPIDEMIA LDL GOAL <70: ICD-10-CM

## 2023-11-06 RX ORDER — PRAVASTATIN SODIUM 40 MG
TABLET ORAL
Qty: 90 TABLET | Refills: 3 | Status: SHIPPED | OUTPATIENT
Start: 2023-11-06

## 2023-11-21 ENCOUNTER — NURSE ONLY (OUTPATIENT)
Dept: FAMILY MEDICINE CLINIC | Facility: CLINIC | Age: 73
End: 2023-11-21

## 2023-11-21 DIAGNOSIS — I10 ESSENTIAL HYPERTENSION, BENIGN: ICD-10-CM

## 2023-11-21 DIAGNOSIS — E11.21 TYPE 2 DIABETES WITH NEPHROPATHY (HCC): ICD-10-CM

## 2023-11-21 DIAGNOSIS — E55.9 VITAMIN D DEFICIENCY: ICD-10-CM

## 2023-11-21 DIAGNOSIS — E78.5 HYPERLIPIDEMIA LDL GOAL <70: ICD-10-CM

## 2023-11-21 LAB
BASOPHILS # BLD: 0.1 K/UL (ref 0–0.2)
BASOPHILS NFR BLD: 1 % (ref 0–2)
DIFFERENTIAL METHOD BLD: ABNORMAL
EOSINOPHIL # BLD: 0.3 K/UL (ref 0–0.8)
EOSINOPHIL NFR BLD: 4 % (ref 0.5–7.8)
ERYTHROCYTE [DISTWIDTH] IN BLOOD BY AUTOMATED COUNT: 13.3 % (ref 11.9–14.6)
HCT VFR BLD AUTO: 45.1 % (ref 41.1–50.3)
HGB BLD-MCNC: 14 G/DL (ref 13.6–17.2)
IMM GRANULOCYTES # BLD AUTO: 0 K/UL (ref 0–0.5)
IMM GRANULOCYTES NFR BLD AUTO: 0 % (ref 0–5)
LYMPHOCYTES # BLD: 1.9 K/UL (ref 0.5–4.6)
LYMPHOCYTES NFR BLD: 27 % (ref 13–44)
MCH RBC QN AUTO: 29.3 PG (ref 26.1–32.9)
MCHC RBC AUTO-ENTMCNC: 31 G/DL (ref 31.4–35)
MCV RBC AUTO: 94.4 FL (ref 82–102)
MONOCYTES # BLD: 0.7 K/UL (ref 0.1–1.3)
MONOCYTES NFR BLD: 10 % (ref 4–12)
NEUTS SEG # BLD: 4.2 K/UL (ref 1.7–8.2)
NEUTS SEG NFR BLD: 59 % (ref 43–78)
NRBC # BLD: 0 K/UL (ref 0–0.2)
PLATELET # BLD AUTO: 258 K/UL (ref 150–450)
PMV BLD AUTO: 10.5 FL (ref 9.4–12.3)
RBC # BLD AUTO: 4.78 M/UL (ref 4.23–5.6)
WBC # BLD AUTO: 7.1 K/UL (ref 4.3–11.1)

## 2023-11-22 LAB
25(OH)D3 SERPL-MCNC: 43.2 NG/ML (ref 30–100)
ALBUMIN SERPL-MCNC: 3.8 G/DL (ref 3.2–4.6)
ALBUMIN/GLOB SERPL: 1 (ref 0.4–1.6)
ALP SERPL-CCNC: 56 U/L (ref 50–136)
ALT SERPL-CCNC: 18 U/L (ref 12–65)
ANION GAP SERPL CALC-SCNC: 10 MMOL/L (ref 2–11)
AST SERPL-CCNC: 19 U/L (ref 15–37)
BILIRUB SERPL-MCNC: 0.5 MG/DL (ref 0.2–1.1)
BUN SERPL-MCNC: 22 MG/DL (ref 8–23)
CALCIUM SERPL-MCNC: 8.8 MG/DL (ref 8.3–10.4)
CHLORIDE SERPL-SCNC: 107 MMOL/L (ref 101–110)
CHOLEST SERPL-MCNC: 137 MG/DL
CO2 SERPL-SCNC: 22 MMOL/L (ref 21–32)
CREAT SERPL-MCNC: 1.4 MG/DL (ref 0.8–1.5)
EST. AVERAGE GLUCOSE BLD GHB EST-MCNC: 183 MG/DL
GLOBULIN SER CALC-MCNC: 3.7 G/DL (ref 2.8–4.5)
GLUCOSE SERPL-MCNC: 144 MG/DL (ref 65–100)
HBA1C MFR BLD: 8 % (ref 4.8–5.6)
HDLC SERPL-MCNC: 41 MG/DL (ref 40–60)
HDLC SERPL: 3.3
LDLC SERPL CALC-MCNC: 69.2 MG/DL
POTASSIUM SERPL-SCNC: 4.5 MMOL/L (ref 3.5–5.1)
PROT SERPL-MCNC: 7.5 G/DL (ref 6.3–8.2)
SODIUM SERPL-SCNC: 139 MMOL/L (ref 133–143)
TRIGL SERPL-MCNC: 134 MG/DL (ref 35–150)
TSH W FREE THYROID IF ABNORMAL: 3.64 UIU/ML (ref 0.36–3.74)
VLDLC SERPL CALC-MCNC: 26.8 MG/DL (ref 6–23)

## 2023-12-05 ENCOUNTER — OFFICE VISIT (OUTPATIENT)
Dept: FAMILY MEDICINE CLINIC | Facility: CLINIC | Age: 73
End: 2023-12-05
Payer: MEDICARE

## 2023-12-05 VITALS
DIASTOLIC BLOOD PRESSURE: 64 MMHG | HEART RATE: 74 BPM | WEIGHT: 162 LBS | BODY MASS INDEX: 25.43 KG/M2 | SYSTOLIC BLOOD PRESSURE: 120 MMHG | OXYGEN SATURATION: 98 % | HEIGHT: 67 IN

## 2023-12-05 DIAGNOSIS — E78.5 HYPERLIPIDEMIA LDL GOAL <70: ICD-10-CM

## 2023-12-05 DIAGNOSIS — I10 ESSENTIAL HYPERTENSION, BENIGN: ICD-10-CM

## 2023-12-05 DIAGNOSIS — E11.21 TYPE 2 DIABETES WITH NEPHROPATHY (HCC): Primary | ICD-10-CM

## 2023-12-05 DIAGNOSIS — N18.30 STAGE 3 CHRONIC KIDNEY DISEASE, UNSPECIFIED WHETHER STAGE 3A OR 3B CKD (HCC): ICD-10-CM

## 2023-12-05 DIAGNOSIS — Z12.5 SPECIAL SCREENING FOR MALIGNANT NEOPLASM OF PROSTATE: ICD-10-CM

## 2023-12-05 DIAGNOSIS — H81.10 BENIGN PAROXYSMAL VERTIGO, UNSPECIFIED LATERALITY: ICD-10-CM

## 2023-12-05 DIAGNOSIS — E55.9 VITAMIN D DEFICIENCY, UNSPECIFIED: ICD-10-CM

## 2023-12-05 PROCEDURE — 3074F SYST BP LT 130 MM HG: CPT | Performed by: NURSE PRACTITIONER

## 2023-12-05 PROCEDURE — 3078F DIAST BP <80 MM HG: CPT | Performed by: NURSE PRACTITIONER

## 2023-12-05 PROCEDURE — 1123F ACP DISCUSS/DSCN MKR DOCD: CPT | Performed by: NURSE PRACTITIONER

## 2023-12-05 PROCEDURE — 3052F HG A1C>EQUAL 8.0%<EQUAL 9.0%: CPT | Performed by: NURSE PRACTITIONER

## 2023-12-05 PROCEDURE — 99214 OFFICE O/P EST MOD 30 MIN: CPT | Performed by: NURSE PRACTITIONER

## 2023-12-05 RX ORDER — BLOOD SUGAR DIAGNOSTIC
STRIP MISCELLANEOUS
Qty: 100 STRIP | Refills: 11 | Status: SHIPPED | OUTPATIENT
Start: 2023-12-05

## 2023-12-05 RX ORDER — ATORVASTATIN CALCIUM 40 MG/1
40 TABLET, FILM COATED ORAL DAILY
Qty: 90 TABLET | Refills: 3 | Status: SHIPPED | OUTPATIENT
Start: 2023-12-05

## 2023-12-05 ASSESSMENT — ENCOUNTER SYMPTOMS
NAUSEA: 0
COUGH: 0
EYE PAIN: 0
SORE THROAT: 0
RHINORRHEA: 0
STRIDOR: 0
RECTAL PAIN: 0
EYES NEGATIVE: 1
DIARRHEA: 0
ANAL BLEEDING: 0
GASTROINTESTINAL NEGATIVE: 1
CHOKING: 0
ABDOMINAL DISTENTION: 0
WHEEZING: 0
SINUS PRESSURE: 0
ALLERGIC/IMMUNOLOGIC NEGATIVE: 1
SINUS PAIN: 0
BLOOD IN STOOL: 0
EYE ITCHING: 0
PHOTOPHOBIA: 0
VOMITING: 0
COLOR CHANGE: 0
SHORTNESS OF BREATH: 0
EYE REDNESS: 0
EYE DISCHARGE: 0
RESPIRATORY NEGATIVE: 1
VOICE CHANGE: 0
APNEA: 0
BACK PAIN: 0
TROUBLE SWALLOWING: 0
CHEST TIGHTNESS: 0
FACIAL SWELLING: 0
ABDOMINAL PAIN: 0
CONSTIPATION: 0

## 2023-12-05 NOTE — PROGRESS NOTES
this note have been dictated using speech recognition software. As a result, errors of speech recognition may have occurred. On this date 12/5/2023 I have spent 34 minutes reviewing previous notes, test results and face to face with the patient discussing the diagnosis and importance of compliance with the treatment plan as well as documenting on the day of the visit. Greater than 50% of this visit was spent counseling the patient about test results, prognosis, importance of compliance, education about disease process, benefits of medications, instructions for management of acute symptoms, and follow up plans. Return in about 6 months (around 6/5/2024) for AWV with fasting labs prior.      Summer Vale, INO - CNP

## 2024-05-02 NOTE — PROGRESS NOTES
Hospitalist Progress Note   Admit Date:  2021 12:49 PM   Name:  Jaspreet Echavarria   Age:  80 y.o. Sex:  male  :  1950   MRN:  [id number]   Room:  [facility]/    Presenting Complaint: Fall    Reason(s) for Admission: Hyponatremia [E87.1]  Hypokalemia [facility] Course & Interval History:   52-year-old male with past medical history of increased intraocular pressure of left eye admitted  for fall. He was initially taken to an urgent care but was transferred to the emergency department. He had notable weakness and was found to be severely hyponatremic and hypokalemic. He was admitted for further evaluation and management. Subjective (21): Overnight his hyponatremia improved. His symptoms also improved though he was left with hiccups, cough and pain in his right rib. Of note he has been Mees and speaking through  is somewhat limiting despite multiple efforts at clarification of some questions. Assessment & Plan:     Acute hyponatremia (2021)  Was on acetazolamide, hydrochlorothiazide and may have had poor p.o. intake.   -Infuse normal saline  -1 dose DDAVP given on  for accelerated rate of resolution      Acute metabolic encephalopathy (3/12/5120)  Resolved      Hypokalemia (2021)  Replete and recheck      Leukocytosis (2021)  Transient      Volume depletion (2021)  IV resuscitation as noted      Rib contusion (2021)  Tylenol for pain      History of recent fall (2021)  Imaging negative for acute process    Hiccups  -Phenergan        Dispo/Discharge Planning:    Home in 1 to 2 days    Diet:  ADULT DIET Regular; 4 carb choices (60 gm/meal)  DVT PPx: SCDs  Code status: Full Code    Hospital Problems as of 2021 Date Reviewed: 2021        Codes Class Noted - Resolved POA    * (Principal) Acute hyponatremia ICD-10-CM: E87.1  ICD-9-CM: 276.1  2021 - Present Yes        Acute metabolic encephalopathy EKY-28-LS: G93.41  ICD-9-CM: 348.31  9/26/2021 - Present Yes        Hypokalemia ICD-10-CM: E87.6  ICD-9-CM: 276.8  9/26/2021 - Present Yes        Leukocytosis ICD-10-CM: D72.829  ICD-9-CM: 288.60  9/26/2021 - Present Yes        Volume depletion ICD-10-CM: E86.9  ICD-9-CM: 276.50  9/26/2021 - Present Yes        Rib contusion ICD-10-CM: M58.470W  ICD-9-CM: 922.1  9/26/2021 - Present Yes        History of recent fall ICD-10-CM: Z91.81  ICD-9-CM: V15.88  9/26/2021 - Present Yes        Hyponatremia ICD-10-CM: E87.1  ICD-9-CM: 276.1  9/26/2021 - Present Yes        Type 2 diabetes with nephropathy ([facility] ICD-10-CM: E11.21  ICD-9-CM: 250.40, 583.81  4/4/2018 - Present Yes        Pure hypercholesterolemia ICD-10-CM: E78.00  ICD-9-CM: 272.0  5/24/2016 - Present Yes        Essential hypertension, benign ICD-10-CM: I10  ICD-9-CM: 401.1  Unknown - Present Yes              Objective:     Patient Vitals for the past 24 hrs:   Temp Pulse Resp BP SpO2   09/27/21 1602    122/69    09/27/21 1441 98.3 °F (36.8 °C) 83 20 132/71 99 %   09/27/21 1112 98.1 °F (36.7 °C) 75 20 116/69 98 %   09/27/21 0811 97.2 °F (36.2 °C) 77 16 120/76 96 %   09/27/21 0332 97.8 °F (36.6 °C) 78 18 121/74 97 %   09/27/21 0000  82      09/26/21 2311 99.2 °F (37.3 °C) 76 19 126/73 98 %   09/26/21 2000  75      09/26/21 1919 98.3 °F (36.8 °C) 82 20 (!) 147/82 100 %   09/26/21 1729     98 %   09/26/21 1719 98.2 °F (36.8 °C) 88 21 115/65 98 %   09/26/21 1709 97.1 °F (36.2 °C) 89 20 (!) 165/80 100 %     Oxygen Therapy  O2 Sat (%): 99 % (09/27/21 1441)  Pulse via Oximetry: 88 beats per minute (09/26/21 1729)  O2 Device: None (Room air) (09/27/21 1331)    Estimated body mass index is 26.47 kg/m² as calculated from the following:    Height as of this encounter: 5' 6\" (1.676 m). Weight as of this encounter: 74.4 kg (164 lb).     Intake/Output Summary (Last 24 hours) at 9/27/2021 1613  Last data filed at 9/27/2021 1519  Gross per 24 hour   Intake 150 ml   Output 2450 ml   Net -2300 ml         Physical Exam  Vitals and nursing note reviewed. Constitutional:       General: He is not in acute distress. Appearance: Normal appearance. HENT:      Head: Normocephalic. Eyes:      Extraocular Movements: Extraocular movements intact. Cardiovascular:      Rate and Rhythm: Normal rate. Pulses: Normal pulses. Radial pulses are 2+ on the left side. Pulmonary:      Effort: Pulmonary effort is normal. No respiratory distress. Breath sounds: No rhonchi. Abdominal:      Tenderness: There is no abdominal tenderness. There is no guarding. Musculoskeletal:         General: No deformity. Cervical back: No rigidity. Right lower leg: No edema. Left lower leg: No edema. Skin:     General: Skin is warm and dry. Neurological:      General: No focal deficit present. Mental Status: He is alert.    Psychiatric:         Mood and Affect: Mood normal.         Behavior: Behavior normal.         I have reviewed ordered lab tests and independently visualized imaging below:    Last 24hr Labs:  Recent Results (from the past 24 hour(s))   GLUCOSE, POC    Collection Time: 09/26/21  5:16 PM   Result Value Ref Range    Glucose (POC) 139 (H) 65 - 100 mg/dL    Performed by [facility] [place]    METABOLIC PANEL, BASIC    Collection Time: 09/26/21  6:41 PM   Result Value Ref Range    Sodium 116 (LL) 136 - 145 mmol/L    Potassium 2.5 (L) 3.5 - 5.1 mmol/L    Chloride 90 (L) 98 - 107 mmol/L    CO2 12 (L) 21 - 32 mmol/L    Anion gap 14 7 - 16 mmol/L    Glucose 133 (H) 65 - 100 mg/dL    BUN 39 (H) 8 - 23 MG/DL    Creatinine 1.26 0.8 - 1.5 MG/DL    GFR est AA >60 >60 ml/min/1.73m2    GFR est non-AA >60 >60 ml/min/1.73m2    Calcium 8.2 (L) 8.3 - 10.4 MG/DL   GLUCOSE, POC    Collection Time: 09/26/21  9:17 PM   Result Value Ref Range    Glucose (POC) 156 (H) 65 - 100 mg/dL    Performed by [id number] [facility], BASIC    Collection Time: 09/27/21  6:05 AM Result Value Ref Range    Sodium 122 (L) 136 - 145 mmol/L    Potassium 3.1 (L) 3.5 - 5.1 mmol/L    Chloride 97 (L) 98 - 107 mmol/L    CO2 12 (L) 21 - 32 mmol/L    Anion gap 13 7 - 16 mmol/L    Glucose 131 (H) 65 - 100 mg/dL    BUN 43 (H) 8 - 23 MG/DL    Creatinine 1.29 0.8 - 1.5 MG/DL    GFR est AA >60 >60 ml/min/1.73m2    GFR est non-AA 59 (L) >60 ml/min/1.73m2    Calcium 7.8 (L) 8.3 - 10.4 MG/DL   CBC WITH AUTOMATED DIFF    Collection Time: 09/27/21  6:05 AM   Result Value Ref Range    WBC 12.9 (H) 4.3 - 11.1 K/uL    RBC 4.09 (L) 4.23 - 5.6 M/uL    HGB 11.7 (L) 13.6 - 17.2 g/dL    HCT 33.9 (L) 41.1 - 50.3 %    MCV 82.9 79.6 - 97.8 FL    MCH 28.6 26.1 - 32.9 PG    MCHC 34.5 31.4 - 35.0 g/dL    RDW 12.2 11.9 - 14.6 %    PLATELET 783 268 - 872 K/uL    MPV 9.2 (L) 9.4 - 12.3 FL    ABSOLUTE NRBC 0.00 0.0 - 0.2 K/uL    DF AUTOMATED      NEUTROPHILS 76 43 - 78 %    LYMPHOCYTES 9 (L) 13 - 44 %    MONOCYTES 14 (H) 4.0 - 12.0 %    EOSINOPHILS 0 (L) 0.5 - 7.8 %    BASOPHILS 0 0.0 - 2.0 %    IMMATURE GRANULOCYTES 1 0.0 - 5.0 %    ABS. NEUTROPHILS 9.8 (H) 1.7 - 8.2 K/UL    ABS. LYMPHOCYTES 1.1 0.5 - 4.6 K/UL    ABS. MONOCYTES 1.9 (H) 0.1 - 1.3 K/UL    ABS. EOSINOPHILS 0.1 0.0 - 0.8 K/UL    ABS. BASOPHILS 0.0 0.0 - 0.2 K/UL    ABS. IMM.  GRANS. 0.1 0.0 - 0.5 K/UL   GLUCOSE, POC    Collection Time: 09/27/21  7:26 AM   Result Value Ref Range    Glucose (POC) 118 (H) 65 - 100 mg/dL    [facility] by [facility]    METABOLIC PANEL, BASIC    Collection Time: 09/27/21 12:02 PM   Result Value Ref Range    Sodium 120 (L) 136 - 145 mmol/L    Potassium 2.8 (L) 3.5 - 5.1 mmol/L    Chloride 97 (L) 98 - 107 mmol/L    CO2 11 (L) 21 - 32 mmol/L    Anion gap 12 7 - 16 mmol/L    Glucose 212 (H) 65 - 100 mg/dL    BUN 43 (H) 8 - 23 MG/DL    Creatinine 1.28 0.8 - 1.5 MG/DL    GFR est AA >60 >60 ml/min/1.73m2    GFR est non-AA 59 (L) >60 ml/min/1.73m2    Calcium 7.4 (L) 8.3 - 10.4 MG/DL   GLUCOSE, POC    Collection Time: 09/27/21 12:50 PM Result Value Ref Range    Glucose (POC) 187 (H) 65 - 100 mg/dL    Performed by Addy    GLUCOSE, POC    Collection Time: 09/27/21  3:59 PM   Result Value Ref Range    Glucose (POC) 134 (H) 65 - 100 mg/dL    Performed by Gabriela        All Micro Results     None          Other Studies:  No results found.     Current Meds:  Current Facility-Administered Medications   Medication Dose Route Frequency    desmopressin (DDAVP) 4 mcg/mL injection 2 mcg  2 mcg SubCUTAneous BID    0.9% sodium chloride infusion  75 mL/hr IntraVENous CONTINUOUS    potassium chloride 20 mEq in 100 ml IVPB  20 mEq IntraVENous ONCE    calcium gluconate 2 g/100 mL sodium chloride (ISO-OSM)  2 g IntraVENous Q2H    sodium chloride (NS) flush 5-40 mL  5-40 mL IntraVENous Q8H    sodium chloride (NS) flush 5-40 mL  5-40 mL IntraVENous PRN    acetaminophen (TYLENOL) tablet 650 mg  650 mg Oral Q6H PRN    Or    acetaminophen (TYLENOL) suppository 650 mg  650 mg Rectal Q6H PRN    polyethylene glycol (MIRALAX) packet 17 g  17 g Oral DAILY PRN    ondansetron (ZOFRAN ODT) tablet 4 mg  4 mg Oral Q8H PRN    Or    ondansetron (ZOFRAN) injection 4 mg  4 mg IntraVENous Q6H PRN    insulin lispro (HUMALOG) injection   SubCUTAneous AC&HS    amLODIPine (NORVASC) tablet 10 mg  10 mg Oral DAILY    fluticasone propionate (FLONASE) 50 mcg/actuation nasal spray 2 Spray  2 Spray Both Nostrils DAILY    losartan (COZAAR) tablet 100 mg  100 mg Oral DAILY    pravastatin (PRAVACHOL) tablet 40 mg  40 mg Oral DAILY       Signed:  Chava Morin MD Patient is a 39 years old female, domicile with family, , employed, without past psych hx, no hx of TYLER, no hx of SA/ violence, medical hx of HLD, PCOS, GERD, Asthma,  gastric sleeve surgery, prediabetes, epilepsy, brought in by EMS from home after seizure at about 8 PM tonight.    Impression: no acute psychopathology at this time. May process her relationship with mother in outpatient therapy.  Recommendations:  Patient is psychiatrically cleared, outpatient therapy would be helpful if patient is amendable  No role for 1:1   No role for standing medications or PRNs at this time  Dispo: patient can be discharge home when medically cleared.

## 2024-06-06 ENCOUNTER — NURSE ONLY (OUTPATIENT)
Dept: FAMILY MEDICINE CLINIC | Facility: CLINIC | Age: 74
End: 2024-06-06
Payer: MEDICARE

## 2024-06-06 DIAGNOSIS — E11.21 TYPE 2 DIABETES WITH NEPHROPATHY (HCC): ICD-10-CM

## 2024-06-06 DIAGNOSIS — Z12.5 SPECIAL SCREENING FOR MALIGNANT NEOPLASM OF PROSTATE: ICD-10-CM

## 2024-06-06 DIAGNOSIS — E55.9 VITAMIN D DEFICIENCY, UNSPECIFIED: ICD-10-CM

## 2024-06-06 LAB
25(OH)D3 SERPL-MCNC: 32.6 NG/ML (ref 30–100)
ALBUMIN SERPL-MCNC: 3.7 G/DL (ref 3.2–4.6)
ALBUMIN/GLOB SERPL: 1.2 (ref 1–1.9)
ALP SERPL-CCNC: 65 U/L (ref 40–129)
ALT SERPL-CCNC: 10 U/L (ref 12–65)
ANION GAP SERPL CALC-SCNC: 9 MMOL/L (ref 9–18)
AST SERPL-CCNC: 25 U/L (ref 15–37)
BASOPHILS # BLD: 0.1 K/UL (ref 0–0.2)
BASOPHILS NFR BLD: 1 % (ref 0–2)
BILIRUB SERPL-MCNC: 0.6 MG/DL (ref 0–1.2)
BILIRUBIN, URINE, POC: NEGATIVE
BLOOD URINE, POC: NEGATIVE
BUN SERPL-MCNC: 21 MG/DL (ref 8–23)
CALCIUM SERPL-MCNC: 8.6 MG/DL (ref 8.8–10.2)
CHLORIDE SERPL-SCNC: 104 MMOL/L (ref 98–107)
CHOLEST SERPL-MCNC: 181 MG/DL (ref 0–200)
CO2 SERPL-SCNC: 25 MMOL/L (ref 20–28)
CREAT SERPL-MCNC: 1.28 MG/DL (ref 0.8–1.3)
DIFFERENTIAL METHOD BLD: ABNORMAL
EOSINOPHIL # BLD: 0.3 K/UL (ref 0–0.8)
EOSINOPHIL NFR BLD: 4 % (ref 0.5–7.8)
ERYTHROCYTE [DISTWIDTH] IN BLOOD BY AUTOMATED COUNT: 14.3 % (ref 11.9–14.6)
EST. AVERAGE GLUCOSE BLD GHB EST-MCNC: 163 MG/DL
GLOBULIN SER CALC-MCNC: 3.1 G/DL (ref 2.3–3.5)
GLUCOSE SERPL-MCNC: 103 MG/DL (ref 70–99)
GLUCOSE URINE, POC: ABNORMAL
HBA1C MFR BLD: 7.3 % (ref 0–5.6)
HCT VFR BLD AUTO: 43.8 % (ref 41.1–50.3)
HDLC SERPL-MCNC: 41 MG/DL (ref 40–60)
HDLC SERPL: 4.4 (ref 0–5)
HGB BLD-MCNC: 13.7 G/DL (ref 13.6–17.2)
IMM GRANULOCYTES # BLD AUTO: 0 K/UL (ref 0–0.5)
IMM GRANULOCYTES NFR BLD AUTO: 0 % (ref 0–5)
KETONES, URINE, POC: NEGATIVE
LDLC SERPL CALC-MCNC: 111 MG/DL (ref 0–100)
LEUKOCYTE ESTERASE, URINE, POC: NEGATIVE
LYMPHOCYTES # BLD: 1.6 K/UL (ref 0.5–4.6)
LYMPHOCYTES NFR BLD: 22 % (ref 13–44)
MCH RBC QN AUTO: 29.7 PG (ref 26.1–32.9)
MCHC RBC AUTO-ENTMCNC: 31.3 G/DL (ref 31.4–35)
MCV RBC AUTO: 95 FL (ref 82–102)
MONOCYTES # BLD: 0.9 K/UL (ref 0.1–1.3)
MONOCYTES NFR BLD: 13 % (ref 4–12)
NEUTS SEG # BLD: 4.3 K/UL (ref 1.7–8.2)
NEUTS SEG NFR BLD: 60 % (ref 43–78)
NITRITE, URINE, POC: NEGATIVE
NRBC # BLD: 0 K/UL (ref 0–0.2)
PH, URINE, POC: 7 (ref 4.6–8)
PLATELET # BLD AUTO: 312 K/UL (ref 150–450)
PMV BLD AUTO: 10.1 FL (ref 9.4–12.3)
POTASSIUM SERPL-SCNC: 4.6 MMOL/L (ref 3.5–5.1)
PROT SERPL-MCNC: 6.8 G/DL (ref 6.3–8.2)
PROTEIN,URINE, POC: NEGATIVE
PSA SERPL-MCNC: 2.6 NG/ML (ref 0–4)
RBC # BLD AUTO: 4.61 M/UL (ref 4.23–5.6)
SODIUM SERPL-SCNC: 137 MMOL/L (ref 136–145)
SPECIFIC GRAVITY, URINE, POC: 1.01 (ref 1–1.03)
TRIGL SERPL-MCNC: 145 MG/DL (ref 0–150)
TSH W FREE THYROID IF ABNORMAL: 3.06 UIU/ML (ref 0.27–4.2)
URINALYSIS CLARITY, POC: CLEAR
URINALYSIS COLOR, POC: YELLOW
UROBILINOGEN, POC: NORMAL
VLDLC SERPL CALC-MCNC: 29 MG/DL (ref 6–23)
WBC # BLD AUTO: 7.2 K/UL (ref 4.3–11.1)

## 2024-06-06 PROCEDURE — 81003 URINALYSIS AUTO W/O SCOPE: CPT | Performed by: NURSE PRACTITIONER

## 2024-06-06 PROCEDURE — 36415 COLL VENOUS BLD VENIPUNCTURE: CPT | Performed by: NURSE PRACTITIONER

## 2024-06-13 ENCOUNTER — TELEMEDICINE (OUTPATIENT)
Dept: FAMILY MEDICINE CLINIC | Facility: CLINIC | Age: 74
End: 2024-06-13
Payer: MEDICARE

## 2024-06-13 DIAGNOSIS — Z00.00 ENCOUNTER FOR ANNUAL WELLNESS VISIT (AWV) IN MEDICARE PATIENT: Primary | ICD-10-CM

## 2024-06-13 DIAGNOSIS — I10 ESSENTIAL HYPERTENSION, BENIGN: ICD-10-CM

## 2024-06-13 DIAGNOSIS — H81.10 BENIGN PAROXYSMAL VERTIGO, UNSPECIFIED LATERALITY: ICD-10-CM

## 2024-06-13 DIAGNOSIS — E11.21 TYPE 2 DIABETES WITH NEPHROPATHY (HCC): ICD-10-CM

## 2024-06-13 DIAGNOSIS — E78.5 HYPERLIPIDEMIA LDL GOAL <70: ICD-10-CM

## 2024-06-13 DIAGNOSIS — E55.9 VITAMIN D DEFICIENCY: ICD-10-CM

## 2024-06-13 PROCEDURE — 1123F ACP DISCUSS/DSCN MKR DOCD: CPT | Performed by: NURSE PRACTITIONER

## 2024-06-13 PROCEDURE — 3051F HG A1C>EQUAL 7.0%<8.0%: CPT | Performed by: NURSE PRACTITIONER

## 2024-06-13 PROCEDURE — 3017F COLORECTAL CA SCREEN DOC REV: CPT | Performed by: NURSE PRACTITIONER

## 2024-06-13 PROCEDURE — G0439 PPPS, SUBSEQ VISIT: HCPCS | Performed by: NURSE PRACTITIONER

## 2024-06-13 RX ORDER — LOSARTAN POTASSIUM 100 MG/1
TABLET ORAL
Qty: 90 TABLET | Refills: 3 | Status: SHIPPED | OUTPATIENT
Start: 2024-06-13

## 2024-06-13 RX ORDER — FOLIC ACID 1 MG/1
1000 TABLET ORAL DAILY
COMMUNITY
Start: 2024-03-14

## 2024-06-13 RX ORDER — MECLIZINE HYDROCHLORIDE 25 MG/1
25 TABLET ORAL 3 TIMES DAILY PRN
Qty: 90 TABLET | Refills: 11 | Status: SHIPPED | OUTPATIENT
Start: 2024-06-13

## 2024-06-13 RX ORDER — PIOGLITAZONEHYDROCHLORIDE 30 MG/1
TABLET ORAL
Qty: 90 TABLET | Refills: 3 | Status: SHIPPED | OUTPATIENT
Start: 2024-06-13

## 2024-06-13 RX ORDER — METHOTREXATE 2.5 MG/1
2.5 TABLET ORAL
COMMUNITY
Start: 2024-04-11

## 2024-06-13 SDOH — ECONOMIC STABILITY: FOOD INSECURITY: WITHIN THE PAST 12 MONTHS, YOU WORRIED THAT YOUR FOOD WOULD RUN OUT BEFORE YOU GOT MONEY TO BUY MORE.: NEVER TRUE

## 2024-06-13 SDOH — ECONOMIC STABILITY: FOOD INSECURITY: WITHIN THE PAST 12 MONTHS, THE FOOD YOU BOUGHT JUST DIDN'T LAST AND YOU DIDN'T HAVE MONEY TO GET MORE.: NEVER TRUE

## 2024-06-13 SDOH — ECONOMIC STABILITY: INCOME INSECURITY: HOW HARD IS IT FOR YOU TO PAY FOR THE VERY BASICS LIKE FOOD, HOUSING, MEDICAL CARE, AND HEATING?: NOT HARD AT ALL

## 2024-06-13 ASSESSMENT — PATIENT HEALTH QUESTIONNAIRE - PHQ9
SUM OF ALL RESPONSES TO PHQ QUESTIONS 1-9: 0
2. FEELING DOWN, DEPRESSED OR HOPELESS: NOT AT ALL
SUM OF ALL RESPONSES TO PHQ9 QUESTIONS 1 & 2: 0
SUM OF ALL RESPONSES TO PHQ QUESTIONS 1-9: 0
1. LITTLE INTEREST OR PLEASURE IN DOING THINGS: NOT AT ALL

## 2024-06-13 ASSESSMENT — LIFESTYLE VARIABLES
HOW MANY STANDARD DRINKS CONTAINING ALCOHOL DO YOU HAVE ON A TYPICAL DAY: 1 OR 2
HOW OFTEN DO YOU HAVE A DRINK CONTAINING ALCOHOL: MONTHLY OR LESS

## 2024-06-13 NOTE — PROGRESS NOTES
PROGRESS NOTE        Consent:      Rudy Ornelas was evaluated through a synchronous (real-time) audio encounter. Patient identification was verified at the start of the visit. He (or guardian if applicable) is aware that this is a billable service, which includes applicable co-pays. This visit was conducted with the patient's (and/or legal guardian's) verbal consent. He has not had a related appointment within my department in the past 7 days or scheduled within the next 24 hours.   The patient was located at Other: [place], [place] .  The provider was located at Other: [place], [place] .    Note: not billable if this call serves to triage the patient into an appointment for the relevant concern     On this date 6/13/2024 I have spent 31 minutes reviewing previous notes, test results and face to face (virtual) with the patient discussing the diagnosis and importance of compliance with the treatment plan as well as documenting on the day of the visit.. Greater than 50% of this visit was spent counseling the patient about test results, prognosis, importance of compliance, education about disease process, benefits of medications, instructions for management of acute symptoms, and follow up plans.      Chief Complaint   Patient presents with    Medicare AWV       SUBJECTIVE:     Rudy Ornelas is a pleasant 73 y.o. male , with past medical history significant for diabetes, hypertension, hyperlipidemia and CKD-currently following nephrology, seen virtually regarding lab results review and follow-up.  Patient is due for his AWV.    Patient reports that he is currently seeing dermatology for extensive and persistent pruritus.  He reports being started on methotrexate but only took a couple doses and now stop. Now receiving some type of injection that pt does not know the name as needed for the itching.  He reports that he stopped the statin due to concerns for drug to drug interaction but agrees to restart

## 2024-06-22 ASSESSMENT — ENCOUNTER SYMPTOMS
APNEA: 0
COLOR CHANGE: 0
STRIDOR: 0
DIARRHEA: 0
RECTAL PAIN: 0
EYE REDNESS: 0
EYE DISCHARGE: 0
GASTROINTESTINAL NEGATIVE: 1
WHEEZING: 0
NAUSEA: 0
VOICE CHANGE: 0
CHOKING: 0
ANAL BLEEDING: 0
SINUS PRESSURE: 0
ABDOMINAL DISTENTION: 0
SINUS PAIN: 0
COUGH: 0
FACIAL SWELLING: 0
ABDOMINAL PAIN: 0
CONSTIPATION: 0
TROUBLE SWALLOWING: 0
SHORTNESS OF BREATH: 0
EYES NEGATIVE: 1
EYE PAIN: 0
EYE ITCHING: 0
ALLERGIC/IMMUNOLOGIC NEGATIVE: 1
PHOTOPHOBIA: 0
VOMITING: 0
BLOOD IN STOOL: 0
BACK PAIN: 0
RHINORRHEA: 0
CHEST TIGHTNESS: 0
SORE THROAT: 0
RESPIRATORY NEGATIVE: 1

## 2024-11-05 DIAGNOSIS — I10 ESSENTIAL HYPERTENSION, BENIGN: ICD-10-CM

## 2024-11-06 RX ORDER — ATENOLOL 25 MG/1
TABLET ORAL
Qty: 90 TABLET | Refills: 3 | OUTPATIENT
Start: 2024-11-06

## 2024-12-06 DIAGNOSIS — E78.5 HYPERLIPIDEMIA LDL GOAL <70: ICD-10-CM

## 2024-12-06 RX ORDER — ATORVASTATIN CALCIUM 40 MG/1
40 TABLET, FILM COATED ORAL DAILY
Qty: 90 TABLET | Refills: 3 | Status: SHIPPED | OUTPATIENT
Start: 2024-12-06

## 2025-01-13 ENCOUNTER — LAB (OUTPATIENT)
Dept: FAMILY MEDICINE CLINIC | Facility: CLINIC | Age: 75
End: 2025-01-13
Payer: MEDICARE

## 2025-01-13 DIAGNOSIS — E55.9 VITAMIN D DEFICIENCY: ICD-10-CM

## 2025-01-13 DIAGNOSIS — I10 ESSENTIAL HYPERTENSION, BENIGN: ICD-10-CM

## 2025-01-13 DIAGNOSIS — E11.21 TYPE 2 DIABETES WITH NEPHROPATHY (HCC): ICD-10-CM

## 2025-01-13 DIAGNOSIS — E78.5 HYPERLIPIDEMIA LDL GOAL <70: ICD-10-CM

## 2025-01-13 LAB
25(OH)D3 SERPL-MCNC: 30.1 NG/ML (ref 30–100)
ALBUMIN SERPL-MCNC: 3.6 G/DL (ref 3.2–4.6)
ALBUMIN/GLOB SERPL: 1.1 (ref 1–1.9)
ALP SERPL-CCNC: 71 U/L (ref 40–129)
ALT SERPL-CCNC: 30 U/L (ref 8–55)
ANION GAP SERPL CALC-SCNC: 12 MMOL/L (ref 7–16)
AST SERPL-CCNC: 29 U/L (ref 15–37)
BASOPHILS # BLD: 0.07 K/UL (ref 0–0.2)
BASOPHILS NFR BLD: 0.8 % (ref 0–2)
BILIRUB SERPL-MCNC: 0.6 MG/DL (ref 0–1.2)
BILIRUBIN, URINE, POC: NEGATIVE
BLOOD URINE, POC: NEGATIVE
BUN SERPL-MCNC: 36 MG/DL (ref 8–23)
CALCIUM SERPL-MCNC: 8.4 MG/DL (ref 8.8–10.2)
CHLORIDE SERPL-SCNC: 106 MMOL/L (ref 98–107)
CHOLEST SERPL-MCNC: 122 MG/DL (ref 0–200)
CO2 SERPL-SCNC: 23 MMOL/L (ref 20–29)
CREAT SERPL-MCNC: 2.39 MG/DL (ref 0.8–1.3)
CREAT UR-MCNC: 112 MG/DL (ref 39–259)
DIFFERENTIAL METHOD BLD: ABNORMAL
EOSINOPHIL # BLD: 0.18 K/UL (ref 0–0.8)
EOSINOPHIL NFR BLD: 2.1 % (ref 0.5–7.8)
ERYTHROCYTE [DISTWIDTH] IN BLOOD BY AUTOMATED COUNT: 14.2 % (ref 11.9–14.6)
EST. AVERAGE GLUCOSE BLD GHB EST-MCNC: 145 MG/DL
GLOBULIN SER CALC-MCNC: 3.2 G/DL (ref 2.3–3.5)
GLUCOSE SERPL-MCNC: 113 MG/DL (ref 70–99)
GLUCOSE URINE, POC: NEGATIVE
HBA1C MFR BLD: 6.7 % (ref 0–5.6)
HCT VFR BLD AUTO: 44 % (ref 41.1–50.3)
HDLC SERPL-MCNC: 39 MG/DL (ref 40–60)
HDLC SERPL: 3.2 (ref 0–5)
HGB BLD-MCNC: 14 G/DL (ref 13.6–17.2)
IMM GRANULOCYTES # BLD AUTO: 0.03 K/UL (ref 0–0.5)
IMM GRANULOCYTES NFR BLD AUTO: 0.3 % (ref 0–5)
KETONES, URINE, POC: NEGATIVE
LDLC SERPL CALC-MCNC: 64 MG/DL (ref 0–100)
LEUKOCYTE ESTERASE, URINE, POC: NEGATIVE
LYMPHOCYTES # BLD: 1.39 K/UL (ref 0.5–4.6)
LYMPHOCYTES NFR BLD: 15.9 % (ref 13–44)
MCH RBC QN AUTO: 29.7 PG (ref 26.1–32.9)
MCHC RBC AUTO-ENTMCNC: 31.8 G/DL (ref 31.4–35)
MCV RBC AUTO: 93.4 FL (ref 82–102)
MICROALBUMIN UR-MCNC: 4.95 MG/DL (ref 0–20)
MICROALBUMIN/CREAT UR-RTO: 44 MG/G (ref 0–30)
MONOCYTES # BLD: 1.13 K/UL (ref 0.1–1.3)
MONOCYTES NFR BLD: 12.9 % (ref 4–12)
NEUTS SEG # BLD: 5.95 K/UL (ref 1.7–8.2)
NEUTS SEG NFR BLD: 68 % (ref 43–78)
NITRITE, URINE, POC: NEGATIVE
NRBC # BLD: 0 K/UL (ref 0–0.2)
PH, URINE, POC: 5.5 (ref 4.6–8)
PLATELET # BLD AUTO: 300 K/UL (ref 150–450)
PMV BLD AUTO: 11 FL (ref 9.4–12.3)
POTASSIUM SERPL-SCNC: 4.5 MMOL/L (ref 3.5–5.1)
PROT SERPL-MCNC: 6.8 G/DL (ref 6.3–8.2)
PROTEIN,URINE, POC: NEGATIVE
RBC # BLD AUTO: 4.71 M/UL (ref 4.23–5.6)
SODIUM SERPL-SCNC: 140 MMOL/L (ref 136–145)
SPECIFIC GRAVITY, URINE, POC: 1.01 (ref 1–1.03)
TRIGL SERPL-MCNC: 99 MG/DL (ref 0–150)
TSH W FREE THYROID IF ABNORMAL: 4.15 UIU/ML (ref 0.27–4.2)
URINALYSIS CLARITY, POC: CLEAR
URINALYSIS COLOR, POC: YELLOW
UROBILINOGEN, POC: NORMAL
VLDLC SERPL CALC-MCNC: 20 MG/DL (ref 6–23)
WBC # BLD AUTO: 8.8 K/UL (ref 4.3–11.1)

## 2025-01-13 PROCEDURE — 36415 COLL VENOUS BLD VENIPUNCTURE: CPT | Performed by: NURSE PRACTITIONER

## 2025-01-13 PROCEDURE — 81003 URINALYSIS AUTO W/O SCOPE: CPT | Performed by: NURSE PRACTITIONER

## 2025-01-17 ENCOUNTER — OFFICE VISIT (OUTPATIENT)
Dept: FAMILY MEDICINE CLINIC | Facility: CLINIC | Age: 75
End: 2025-01-17

## 2025-01-17 VITALS
HEART RATE: 57 BPM | HEIGHT: 67 IN | BODY MASS INDEX: 24.55 KG/M2 | DIASTOLIC BLOOD PRESSURE: 78 MMHG | WEIGHT: 156.4 LBS | SYSTOLIC BLOOD PRESSURE: 124 MMHG | OXYGEN SATURATION: 99 %

## 2025-01-17 DIAGNOSIS — E11.21 TYPE 2 DIABETES WITH NEPHROPATHY (HCC): ICD-10-CM

## 2025-01-17 DIAGNOSIS — E78.5 HYPERLIPIDEMIA LDL GOAL <70: ICD-10-CM

## 2025-01-17 DIAGNOSIS — H81.10 BENIGN PAROXYSMAL VERTIGO, UNSPECIFIED LATERALITY: ICD-10-CM

## 2025-01-17 DIAGNOSIS — N18.30 STAGE 3 CHRONIC KIDNEY DISEASE, UNSPECIFIED WHETHER STAGE 3A OR 3B CKD (HCC): ICD-10-CM

## 2025-01-17 DIAGNOSIS — I10 ESSENTIAL HYPERTENSION, BENIGN: Primary | ICD-10-CM

## 2025-01-17 RX ORDER — ATENOLOL 25 MG/1
25 TABLET ORAL DAILY
Qty: 90 TABLET | Refills: 3 | Status: SHIPPED | OUTPATIENT
Start: 2025-01-17

## 2025-01-17 RX ORDER — SPIRONOLACTONE 25 MG/1
25 TABLET ORAL DAILY PRN
Qty: 90 TABLET | Refills: 3 | Status: SHIPPED | OUTPATIENT
Start: 2025-01-17

## 2025-01-17 RX ORDER — ATORVASTATIN CALCIUM 40 MG/1
40 TABLET, FILM COATED ORAL DAILY
Qty: 90 TABLET | Refills: 3 | Status: SHIPPED | OUTPATIENT
Start: 2025-01-17

## 2025-01-17 RX ORDER — MECLIZINE HYDROCHLORIDE 25 MG/1
25 TABLET ORAL 3 TIMES DAILY PRN
Qty: 90 TABLET | Refills: 11 | Status: SHIPPED | OUTPATIENT
Start: 2025-01-17

## 2025-01-17 RX ORDER — LOSARTAN POTASSIUM 100 MG/1
TABLET ORAL
Qty: 90 TABLET | Refills: 3 | Status: SHIPPED | OUTPATIENT
Start: 2025-01-17

## 2025-01-17 RX ORDER — BLOOD SUGAR DIAGNOSTIC
STRIP MISCELLANEOUS
Qty: 100 STRIP | Refills: 11 | Status: SHIPPED | OUTPATIENT
Start: 2025-01-17

## 2025-01-17 RX ORDER — PIOGLITAZONE 30 MG/1
TABLET ORAL
Qty: 90 TABLET | Refills: 3 | Status: SHIPPED | OUTPATIENT
Start: 2025-01-17

## 2025-01-17 ASSESSMENT — ENCOUNTER SYMPTOMS
EYE PAIN: 0
VOICE CHANGE: 0
EYES NEGATIVE: 1
RHINORRHEA: 0
BACK PAIN: 0
COLOR CHANGE: 0
ALLERGIC/IMMUNOLOGIC NEGATIVE: 1
SINUS PRESSURE: 0
APNEA: 0
VOMITING: 0
SINUS PAIN: 0
BLOOD IN STOOL: 0
DIARRHEA: 0
CHEST TIGHTNESS: 0
NAUSEA: 0
TROUBLE SWALLOWING: 0
EYE ITCHING: 0
COUGH: 0
PHOTOPHOBIA: 0
ANAL BLEEDING: 0
WHEEZING: 0
FACIAL SWELLING: 0
CONSTIPATION: 0
RECTAL PAIN: 0
ABDOMINAL DISTENTION: 0
RESPIRATORY NEGATIVE: 1
STRIDOR: 0
SORE THROAT: 0
CHOKING: 0
EYE DISCHARGE: 0
ABDOMINAL PAIN: 0
EYE REDNESS: 0
SHORTNESS OF BREATH: 0
GASTROINTESTINAL NEGATIVE: 1

## 2025-01-17 NOTE — PROGRESS NOTES
Expiration Date:   1/17/2026         Elements of this note have been dictated using speech recognition software. As a result, errors of speech recognition may have occurred.      On this date 1/17/2025 I have spent 35 minutes reviewing previous notes, test results and face to face with the patient discussing the diagnosis and importance of compliance with the treatment plan as well as documenting on the day of the visit. Greater than 50% of this visit was spent counseling the patient about test results, prognosis, importance of compliance, education about disease process, benefits of medications, instructions for management of acute symptoms, and follow up plans.    Return for labs only next week then 6 mos AWV with fasting labs prior.     Nabila Zimmer, APRN - CNP

## 2025-01-24 ENCOUNTER — LAB (OUTPATIENT)
Dept: FAMILY MEDICINE CLINIC | Facility: CLINIC | Age: 75
End: 2025-01-24

## 2025-01-24 DIAGNOSIS — N18.30 STAGE 3 CHRONIC KIDNEY DISEASE, UNSPECIFIED WHETHER STAGE 3A OR 3B CKD (HCC): ICD-10-CM

## 2025-01-24 LAB
ANION GAP SERPL CALC-SCNC: 10 MMOL/L (ref 7–16)
BUN SERPL-MCNC: 28 MG/DL (ref 8–23)
CALCIUM SERPL-MCNC: 8.3 MG/DL (ref 8.8–10.2)
CHLORIDE SERPL-SCNC: 105 MMOL/L (ref 98–107)
CO2 SERPL-SCNC: 23 MMOL/L (ref 20–29)
CREAT SERPL-MCNC: 1.48 MG/DL (ref 0.8–1.3)
GLUCOSE SERPL-MCNC: 95 MG/DL (ref 70–99)
POTASSIUM SERPL-SCNC: 4.5 MMOL/L (ref 3.5–5.1)
SODIUM SERPL-SCNC: 138 MMOL/L (ref 136–145)

## 2025-07-07 DIAGNOSIS — E78.5 HYPERLIPIDEMIA LDL GOAL <70: ICD-10-CM

## 2025-07-07 DIAGNOSIS — E55.9 VITAMIN D DEFICIENCY: ICD-10-CM

## 2025-07-07 DIAGNOSIS — E11.21 TYPE 2 DIABETES WITH NEPHROPATHY (HCC): Primary | ICD-10-CM

## 2025-07-11 ENCOUNTER — LAB (OUTPATIENT)
Dept: FAMILY MEDICINE CLINIC | Facility: CLINIC | Age: 75
End: 2025-07-11

## 2025-07-11 DIAGNOSIS — E11.21 TYPE 2 DIABETES WITH NEPHROPATHY (HCC): ICD-10-CM

## 2025-07-11 DIAGNOSIS — E55.9 VITAMIN D DEFICIENCY: ICD-10-CM

## 2025-07-11 DIAGNOSIS — E78.5 HYPERLIPIDEMIA LDL GOAL <70: ICD-10-CM

## 2025-07-11 LAB
25(OH)D3 SERPL-MCNC: 58.3 NG/ML (ref 30–100)
ALBUMIN SERPL-MCNC: 3.7 G/DL (ref 3.2–4.6)
ALBUMIN/GLOB SERPL: 1 (ref 1–1.9)
ALP SERPL-CCNC: 83 U/L (ref 40–129)
ALT SERPL-CCNC: 15 U/L (ref 8–55)
ANION GAP SERPL CALC-SCNC: 11 MMOL/L (ref 7–16)
AST SERPL-CCNC: 26 U/L (ref 15–37)
BASOPHILS # BLD: 0.04 K/UL (ref 0–0.2)
BASOPHILS NFR BLD: 0.5 % (ref 0–2)
BILIRUB SERPL-MCNC: 0.7 MG/DL (ref 0–1.2)
BUN SERPL-MCNC: 24 MG/DL (ref 8–23)
CALCIUM SERPL-MCNC: 8.8 MG/DL (ref 8.8–10.2)
CHLORIDE SERPL-SCNC: 102 MMOL/L (ref 98–107)
CHOLEST SERPL-MCNC: 114 MG/DL (ref 0–200)
CO2 SERPL-SCNC: 22 MMOL/L (ref 20–29)
CREAT SERPL-MCNC: 1.4 MG/DL (ref 0.8–1.3)
CREAT UR-MCNC: 56.7 MG/DL (ref 39–259)
DIFFERENTIAL METHOD BLD: NORMAL
EOSINOPHIL # BLD: 0.55 K/UL (ref 0–0.8)
EOSINOPHIL NFR BLD: 6.4 % (ref 0.5–7.8)
ERYTHROCYTE [DISTWIDTH] IN BLOOD BY AUTOMATED COUNT: 13.4 % (ref 11.9–14.6)
EST. AVERAGE GLUCOSE BLD GHB EST-MCNC: 162 MG/DL
GLOBULIN SER CALC-MCNC: 3.6 G/DL (ref 2.3–3.5)
GLUCOSE SERPL-MCNC: 112 MG/DL (ref 70–99)
HBA1C MFR BLD: 7.3 % (ref 0–5.6)
HCT VFR BLD AUTO: 44.8 % (ref 41.1–50.3)
HDLC SERPL-MCNC: 35 MG/DL (ref 40–60)
HDLC SERPL: 3.3 (ref 0–5)
HGB BLD-MCNC: 14.4 G/DL (ref 13.6–17.2)
IMM GRANULOCYTES # BLD AUTO: 0.02 K/UL (ref 0–0.5)
IMM GRANULOCYTES NFR BLD AUTO: 0.2 % (ref 0–5)
LDLC SERPL CALC-MCNC: 60 MG/DL (ref 0–100)
LYMPHOCYTES # BLD: 2.07 K/UL (ref 0.5–4.6)
LYMPHOCYTES NFR BLD: 24 % (ref 13–44)
MCH RBC QN AUTO: 29 PG (ref 26.1–32.9)
MCHC RBC AUTO-ENTMCNC: 32.1 G/DL (ref 31.4–35)
MCV RBC AUTO: 90.1 FL (ref 82–102)
MICROALBUMIN UR-MCNC: <1.2 MG/DL (ref 0–20)
MICROALBUMIN/CREAT UR-RTO: NORMAL MG/G (ref 0–30)
MONOCYTES # BLD: 0.69 K/UL (ref 0.1–1.3)
MONOCYTES NFR BLD: 8 % (ref 4–12)
NEUTS SEG # BLD: 5.24 K/UL (ref 1.7–8.2)
NEUTS SEG NFR BLD: 60.9 % (ref 43–78)
NRBC # BLD: 0 K/UL (ref 0–0.2)
PLATELET # BLD AUTO: 272 K/UL (ref 150–450)
PMV BLD AUTO: 10.8 FL (ref 9.4–12.3)
POTASSIUM SERPL-SCNC: 4.5 MMOL/L (ref 3.5–5.1)
PROT SERPL-MCNC: 7.3 G/DL (ref 6.3–8.2)
RBC # BLD AUTO: 4.97 M/UL (ref 4.23–5.6)
SODIUM SERPL-SCNC: 135 MMOL/L (ref 136–145)
TRIGL SERPL-MCNC: 95 MG/DL (ref 0–150)
TSH W FREE THYROID IF ABNORMAL: 2.51 UIU/ML (ref 0.27–4.2)
VLDLC SERPL CALC-MCNC: 19 MG/DL (ref 6–23)
WBC # BLD AUTO: 8.6 K/UL (ref 4.3–11.1)

## 2025-07-18 ENCOUNTER — OFFICE VISIT (OUTPATIENT)
Dept: FAMILY MEDICINE CLINIC | Facility: CLINIC | Age: 75
End: 2025-07-18

## 2025-07-18 VITALS
WEIGHT: 159.8 LBS | HEIGHT: 67 IN | BODY MASS INDEX: 25.08 KG/M2 | SYSTOLIC BLOOD PRESSURE: 160 MMHG | HEART RATE: 66 BPM | DIASTOLIC BLOOD PRESSURE: 80 MMHG | OXYGEN SATURATION: 98 %

## 2025-07-18 DIAGNOSIS — N18.30 STAGE 3 CHRONIC KIDNEY DISEASE, UNSPECIFIED WHETHER STAGE 3A OR 3B CKD (HCC): ICD-10-CM

## 2025-07-18 DIAGNOSIS — E11.21 TYPE 2 DIABETES WITH NEPHROPATHY (HCC): ICD-10-CM

## 2025-07-18 DIAGNOSIS — E78.5 HYPERLIPIDEMIA LDL GOAL <70: ICD-10-CM

## 2025-07-18 DIAGNOSIS — E55.9 VITAMIN D DEFICIENCY: ICD-10-CM

## 2025-07-18 DIAGNOSIS — I10 ESSENTIAL HYPERTENSION, BENIGN: Primary | ICD-10-CM

## 2025-07-18 DIAGNOSIS — H81.10 BENIGN PAROXYSMAL VERTIGO, UNSPECIFIED LATERALITY: ICD-10-CM

## 2025-07-18 DIAGNOSIS — Z12.5 ENCOUNTER FOR PROSTATE CANCER SCREENING: ICD-10-CM

## 2025-07-18 DIAGNOSIS — Z00.00 ENCOUNTER FOR ANNUAL WELLNESS VISIT (AWV) IN MEDICARE PATIENT: ICD-10-CM

## 2025-07-18 RX ORDER — LOSARTAN POTASSIUM 100 MG/1
TABLET ORAL
Qty: 90 TABLET | Refills: 3 | Status: SHIPPED
Start: 2025-07-18

## 2025-07-18 SDOH — ECONOMIC STABILITY: FOOD INSECURITY: WITHIN THE PAST 12 MONTHS, THE FOOD YOU BOUGHT JUST DIDN'T LAST AND YOU DIDN'T HAVE MONEY TO GET MORE.: NEVER TRUE

## 2025-07-18 SDOH — ECONOMIC STABILITY: FOOD INSECURITY: WITHIN THE PAST 12 MONTHS, YOU WORRIED THAT YOUR FOOD WOULD RUN OUT BEFORE YOU GOT MONEY TO BUY MORE.: NEVER TRUE

## 2025-07-18 ASSESSMENT — ENCOUNTER SYMPTOMS
SHORTNESS OF BREATH: 0
ABDOMINAL DISTENTION: 0
WHEEZING: 0
CHEST TIGHTNESS: 0
CHOKING: 0
GASTROINTESTINAL NEGATIVE: 1
EYE REDNESS: 0
DIARRHEA: 0
EYE ITCHING: 0
EYE DISCHARGE: 0
VOMITING: 0
FACIAL SWELLING: 0
EYE PAIN: 0
ALLERGIC/IMMUNOLOGIC NEGATIVE: 1
RESPIRATORY NEGATIVE: 1
NAUSEA: 0
STRIDOR: 0
BACK PAIN: 0
RHINORRHEA: 0
EYES NEGATIVE: 1
CONSTIPATION: 0
SINUS PAIN: 0
RECTAL PAIN: 0
VOICE CHANGE: 0
ABDOMINAL PAIN: 0
APNEA: 0
SINUS PRESSURE: 0
ANAL BLEEDING: 0
PHOTOPHOBIA: 0
TROUBLE SWALLOWING: 0
BLOOD IN STOOL: 0
COLOR CHANGE: 0
COUGH: 0
SORE THROAT: 0

## 2025-07-18 ASSESSMENT — PATIENT HEALTH QUESTIONNAIRE - PHQ9
SUM OF ALL RESPONSES TO PHQ QUESTIONS 1-9: 0
1. LITTLE INTEREST OR PLEASURE IN DOING THINGS: NOT AT ALL
SUM OF ALL RESPONSES TO PHQ QUESTIONS 1-9: 0
2. FEELING DOWN, DEPRESSED OR HOPELESS: NOT AT ALL
SUM OF ALL RESPONSES TO PHQ QUESTIONS 1-9: 0
SUM OF ALL RESPONSES TO PHQ QUESTIONS 1-9: 0

## 2025-07-18 NOTE — PATIENT INSTRUCTIONS
problems such as diabetes, high blood pressure, and high cholesterol. If you think you may have a problem with alcohol or drug use, talk to your doctor.   Medicines    Take your medicines exactly as prescribed. Call your doctor if you think you are having a problem with your medicine.     If your doctor recommends aspirin, take the amount directed each day. Make sure you take aspirin and not another kind of pain reliever, such as acetaminophen (Tylenol).   When should you call for help?   Call 911 if you have symptoms of a heart attack. These may include:    Chest pain or pressure, or a strange feeling in the chest.     Sweating.     Shortness of breath.     Pain, pressure, or a strange feeling in the back, neck, jaw, or upper belly or in one or both shoulders or arms.     Lightheadedness or sudden weakness.     A fast or irregular heartbeat.   After you call 911, the  may tell you to chew 1 adult-strength or 2 to 4 low-dose aspirin. Wait for an ambulance. Do not try to drive yourself.  Watch closely for changes in your health, and be sure to contact your doctor if you have any problems.  Where can you learn more?  Go to https://www.[employer].net/patientEd and enter F075 to learn more about \"A Healthy Heart: Care Instructions.\"  Current as of: July 31, 2024  Content Version: 14.5  © 3573-8350 [employer].   Care instructions adapted under license by [employer]. If you have questions about a medical condition or this instruction, always ask your healthcare professional. [employer], disclaims any warranty or liability for your use of this information.    Personalized Preventive Plan for Rudy Ornelas - 7/18/2025  Medicare offers a range of preventive health benefits. Some of the tests and screenings are paid in full while other may be subject to a deductible, co-insurance, and/or copay.  Some of these benefits include a comprehensive review of your medical history including lifestyle,

## 2025-07-18 NOTE — PROGRESS NOTES
PROGRESS NOTE    Chief Complaint   Patient presents with    Medicare AWV     AWV with lab f/u       SUBJECTIVE:         History of Present Illness  Rudy Ornelas is a 74 y.o. male with hx of  diabetes, hypertension, hyperlipidemia and CKD-currently following nephrology, seen today in office for lab results review and follow-up.  He is accompanied by his wife for today's visit.  He is due for his Medicare AWV.  He reports having had episodes of dizziness particularly in the afternoon.  He has noted his blood pressure became quite low particularly diastolic into the 50s.  He has not been taking his blood pressure losartan 100 mg regularly due to this issue.  He reports sometimes he also skips atenolol 25 mg.  He reports he has not taking his losartan or any other antihypertensive this morning due to concerns for hypotensive reading.  He continues to follow his nephrologist.  He does endorse that if his BP is low, he does take sodium tablets and that helped stabilize his BP.        Past Medical History, Past Surgical History, Family history, Social History, and Medications were all reviewed with the patient today and updated as necessary.       Current Outpatient Medications   Medication Sig Dispense Refill    losartan (COZAAR) 100 MG tablet TAKE 1/2 tablet (50mg) and 1/2 tablet (50mg) TABLET BY MOUTH 2 TIME DAILY FOR BLOOD PRESSURE 90 tablet 3    spironolactone (ALDACTONE) 25 MG tablet Take 1 tablet by mouth daily as needed (swelling) 90 tablet 3    pioglitazone (ACTOS) 30 MG tablet TAKE ONE TABLET BY MOUTH ONE TIME DAILY FOR SUGAR 90 tablet 3    meclizine (ANTIVERT) 25 MG tablet Take 1 tablet by mouth 3 times daily as needed for Dizziness 90 tablet 11    atorvastatin (LIPITOR) 40 MG tablet Take 1 tablet by mouth daily 90 tablet 3    atenolol (TENORMIN) 25 MG tablet Take 1 tablet by mouth daily For blood pressure 90 tablet 3    blood glucose test strips (ACCU-CHEK JENNIFER PLUS) strip USE TO CHECK BLOOD SUGAR TWO

## (undated) DEVICE — CANNULA NSL ORAL AD FOR CAPNOFLEX CO2 O2 AIRLFE

## (undated) DEVICE — CONNECTOR TBNG OD5-7MM O2 END DISP

## (undated) DEVICE — NDL PRT INJ NSAF BLNT 18GX1.5 --

## (undated) DEVICE — KENDALL RADIOLUCENT FOAM MONITORING ELECTRODE RECTANGULAR SHAPE: Brand: KENDALL

## (undated) DEVICE — SYR 5ML 1/5 GRAD LL NSAF LF --

## (undated) DEVICE — SYR 3ML LL TIP 1/10ML GRAD --

## (undated) DEVICE — SYR 50ML SLIP TIP NSAF LF STRL --